# Patient Record
Sex: FEMALE | Race: WHITE | Employment: FULL TIME | URBAN - METROPOLITAN AREA
[De-identification: names, ages, dates, MRNs, and addresses within clinical notes are randomized per-mention and may not be internally consistent; named-entity substitution may affect disease eponyms.]

---

## 2017-02-09 ENCOUNTER — CLINICAL SUPPORT (OUTPATIENT)
Dept: CARDIOLOGY CLINIC | Age: 57
End: 2017-02-09

## 2017-02-09 ENCOUNTER — OFFICE VISIT (OUTPATIENT)
Dept: CARDIOLOGY CLINIC | Age: 57
End: 2017-02-09

## 2017-02-09 VITALS
WEIGHT: 200 LBS | HEIGHT: 65 IN | DIASTOLIC BLOOD PRESSURE: 90 MMHG | OXYGEN SATURATION: 96 % | SYSTOLIC BLOOD PRESSURE: 146 MMHG | HEART RATE: 106 BPM | BODY MASS INDEX: 33.32 KG/M2

## 2017-02-09 DIAGNOSIS — R55 SYNCOPE, UNSPECIFIED SYNCOPE TYPE: Primary | ICD-10-CM

## 2017-02-09 DIAGNOSIS — Z95.9 CARDIAC DEVICE IN SITU: ICD-10-CM

## 2017-02-09 DIAGNOSIS — I49.3 PVC'S (PREMATURE VENTRICULAR CONTRACTIONS): Primary | ICD-10-CM

## 2017-02-09 DIAGNOSIS — E78.5 HYPERLIPIDEMIA, UNSPECIFIED HYPERLIPIDEMIA TYPE: ICD-10-CM

## 2017-02-09 DIAGNOSIS — I49.3 PVC'S (PREMATURE VENTRICULAR CONTRACTIONS): ICD-10-CM

## 2017-02-09 DIAGNOSIS — R60.0 BILATERAL LEG EDEMA: ICD-10-CM

## 2017-02-09 NOTE — MR AVS SNAPSHOT
Visit Information Date & Time Provider Department Dept. Phone Encounter #  
 2/9/2017  1:20 PM Roslyn Diop MD [facility]  Your Appointments 2/20/2017  9:00 AM  
PRE PROCEDURE with Bp Hv C[facility] ([address] [place] [address]) Appt Note: AICD 2/28 [place] [address] [address] [address] [phone] 2300 [facility] [address] [address]  
  
    
 3/9/2017  8:20 AM  
Follow Up with Roslyn Diop MD  
[facility] ([address] [place] [address]) Appt Note: 6 month follow up with EKG  
 [address] [place] [address] [address] [address] [address] [phone] [address] [phone]  
  
    
 3/16/2017  9:45 AM  
LAB with [facility] NURSE VISIT Internist of [facility] ([address] [place] [address]) Appt Note: labs for pe rd  
 [address] [facility], [address] [facility] [address] [place]  
  
   
 [address], [address]  
  
    
 3/23/2017 10:30 AM  
PHYSICAL with Ton Hdez MD  
Internist of [address]) Appt Note: pe w/pap, rd  
 [address] [facility], [address] [address] [address] [place]  
  
   
 [address], [address] Upcoming Health Maintenance Date Due DTaP/Tdap/Td series (1 - Tdap) 10/6/2010 FOBT Q 1 YEAR AGE 50-75 12/11/2010 PAP AKA CERVICAL CYTOLOGY 11/21/2015 INFLUENZA AGE 9 TO ADULT 8/1/2016 BREAST CANCER SCRN MAMMOGRAM 4/9/2017 Allergies as of 2/9/2017  Review Complete On: 2/9/2017 By: Roslyn Diop MD  
  
 Severity Noted Reaction Type Reactions Adhesive Tape-silicones  78/13/7841    Contact Dermatitis Bee Sting [Sting, Bee]    Anaphylaxis Benzoyl Peroxide    Rash Betadine [Povidone-iodine]  02/08/2016    Rash Chlorhexidine Towelette  11/11/2016    Rash Codeine    Nausea and Vomiting Contrave [Naltrexone-bupropion]  06/19/2015    Other (comments) Vision changes Current Immunizations  Reviewed on 8/13/2012 Name Date  
 TD Vaccine 10/5/2010 Zoster Vaccine, Live 11/27/2013 Not reviewed this visit You Were Diagnosed With   
  
 Codes Comments Syncope, unspecified syncope type    -  Primary ICD-10-CM: R55 
ICD-9-CM: 780.2 PVC's (premature ventricular contractions)     ICD-10-CM: I49.3 ICD-9-CM: 427.69 Bilateral leg edema     ICD-10-CM: R60.0 ICD-9-CM: 008. 3 Hyperlipidemia, unspecified hyperlipidemia type     ICD-10-CM: E78.5 ICD-9-CM: 272.4 Cardiac device in situ     ICD-10-CM: Z95.9 ICD-9-CM: V45.00 Vitals BP Pulse Height(growth percentile) Weight(growth percentile) SpO2 BMI  
 146/90 (!) 106 5' 5\" (1.651 m) 200 lb (90.7 kg) 96% 33.28 kg/m2 OB Status Smoking Status Postmenopausal Former Smoker Vitals History BMI and BSA Data Body Mass Index Body Surface Area  
 33.28 kg/m 2 2.04 m 2 Preferred Pharmacy Pharmacy Name Phone [facility] [address] [place] [address] [phone] Your Updated Medication List  
  
   
This list is accurate as of: 2/9/17  1:55 PM.  Always use your most recent med list.  
  
  
  
  
 calcium-cholecalciferol (D3) tablet Commonly known as:  CALTRATE 600+D Take 1 Tab by mouth daily. CENTRUM SILVER WOMEN PO Take 1 Tab by mouth daily. diclofenac EC 75 mg EC tablet Commonly known as:  VOLTAREN  
TAKE 1 TAB BY MOUTH TWO (2) TIMES A DAY. EPINEPHrine 0.3 mg/0.3 mL injection Commonly known as:  EPIPEN  
0.3 mL by IntraMUSCular route once as needed for up to 1 dose. fish oil-dha-epa 1,200-144-216 mg Cap Take 1 Cap by mouth daily. pramipexole 1 mg tablet Commonly known as:  MIRAPEX TAKE 1 TABLET BY MOUTH THREE TIMES DAILY  
  
 traMADol 50 mg tablet Commonly known as:  ULTRAM  
Take 1 Tab by mouth every six (6) hours as needed for Pain. We Performed the Following AMB POC EKG ROUTINE W/ 12 LEADS, INTER & REP [85793 CPT(R)] Introducing [facility] SERVICES! Dear Sapna Mckeon: Thank you for requesting a ULURU account. Our records indicate that you already have an active ULURU account. You can access your account anytime at [email or web address] Did you know that you can access your hospital and ER discharge instructions at any time in ULURU? You can also review all of your test results from your hospital stay or ER visit. Additional Information If you have questions, please visit the Frequently Asked Questions section of the ULURU website at [email or web address] Remember, ULURU is NOT to be used for urgent needs. For medical emergencies, dial 911. Now available from your iPhone and Android! Please provide this summary of care documentation to your next provider. Your primary care clinician is listed as Benny Joiner. If you have any questions after today's visit, please call [phone].

## 2017-02-09 NOTE — PROGRESS NOTES
History of Present Illness:  A 64 y.o. female here for follow up. She had an episode of syncope last year. She had been taking Toprol 25 mg twice a day and it occurred while swallowing. I was concerned about possible vasovagal.  Her monitor did show a 3.2 second pause. Thinking back, she may have had some dizziness even prior to this. I placed a loop recorder in November to monitor especially given her history of PVCs. She has had at least three episodes of pause of up to 3.5 seconds. She has a baseline incomplete right bundle branch block with left anterior fascicular block. Given the fact that she has had syncope, she is here to discuss options.      Impression:   1. History of syncope likely related to bradycardia although vasovagal cannot be completely excluded. 2. Recurrent pauses despite stopping beta-blocker. 3. Baseline incomplete right bundle branch block with left anterior fascicular block. 4. Chronic pain and tension headaches. 5. History of PVCs with attempted ablation February 2016. 6. History of exercise induced asthma.      Given her history of syncope with findings by her loop recorder showing monthly pauses consistent with sick sinus syndrome of up to 3.5 seconds as well as abnormal EKG with incomplete right bundle branch block and left anterior fascicular block, I recommended implantation of a dual chamber MRI compatible pacemaker given her young age. She would like to proceed. Risks, benefits, alternatives discussed. All questions answered. I will make arrangements. Past Medical History   Diagnosis Date    Arrhythmia      PVC 10% burden; s/p partial ablation Dr Maribel Smith 2/16    Asthma      h/o exercise induced    Cardiac Agatston CAC score, <100 02/29/2016     Coronary calcium score 0.    Cardiac echocardiogram 01/19/2016     EF 55-60%. No RWMA. Indeterminate diastolic fx. RVSP 26 mmHg. Mild MR.       Cardiac Holter monitor study 12/21/2015     Sinus rhythm, avg HR 82 bpm (range  bpm). Numerous PVCs. A few short runs of VT.  Cardiovascular LE venous duplex 07/08/2016     No DVT bilaterally.  Cholelithiasis 2008     on US    Chronic back pain      Dr. Jorden Ruiz tenosynovitis      Dr Nolan Amos Dupuytren's contracture of both hands      Dr Nolan Amos FHx: heart disease     GERD (gastroesophageal reflux disease) 2/99     on UGI    History of palpitations 2005    Hyperlipidemia      calculated 10 year risk score was 1.8% (12/15); 2.2% (10/16)    Hypovitaminosis D     Myofacial pain      neck and thoracic    OA (osteoarthritis)      lumbar DDD    Obesity      peak weight 200 lbs, bmi 33.3 from 5/13; qsymia ineffective, intol contrave, delcined med supervised wt loss; 24h U petar nl    Osteopenia 2007     t score -1 spine, -0.2 hip Dr. Maggy Holman Pericardial cyst 12/13     calcified on CT    Prediabetes 11/12    Tachycardia      neg eval Dr. Kelton Garvin 2004    Tension headache     Thyroid nodule 8/12     3mm; 5/13, 12/13, 11/14, 1/16 no change    Trigger finger of both hands      Dr Luis De La Torre       Current Outpatient Prescriptions   Medication Sig Dispense Refill    pramipexole (MIRAPEX) 1 mg tablet TAKE 1 TABLET BY MOUTH THREE TIMES DAILY 180 Tab 2    diclofenac EC (VOLTAREN) 75 mg EC tablet TAKE 1 TAB BY MOUTH TWO (2) TIMES A DAY. 180 Tab 2    calcium-cholecalciferol, D3, (CALTRATE 600+D) tablet Take 1 Tab by mouth daily.  fish oil-dha-epa 1,200-144-216 mg cap Take 1 Cap by mouth daily.  MULTIVITS-MIN/IRON/FA/LUTEIN (CENTRUM SILVER WOMEN PO) Take 1 Tab by mouth daily.  EPINEPHrine (EPIPEN) 0.3 mg/0.3 mL (1:1,000) injection 0.3 mL by IntraMUSCular route once as needed for up to 1 dose. 0.3 mL 1    traMADol (ULTRAM) 50 mg tablet Take 1 Tab by mouth every six (6) hours as needed for Pain. 60 Tab 0       Social History   reports that she quit smoking about 24 years ago. She has a 20.00 pack-year smoking history.  She has never used smokeless tobacco.   reports that she drinks alcohol. Family History  family history includes Alcohol abuse in her brother; Aldon Frisk in her brother, father, mother, and sister; Cancer in her mother; Diabetes in her father and mother; Elevated Lipids in her father and mother; Heart Disease in her father; Hypertension in her mother; Migraines in her sister; Stroke in her mother. Review of Systems  Except as stated above include:  Constitutional: Negative for fever, chills and malaise/fatigue. HEENT: No congestion or recent URI. Gastrointestinal: No nausea, vomiting, abdominal pain, bloody stools. Pulmonary:  Negative except as stated above. Cardiac:  Negative except as stated above. Musculoskeletal: Negative except as stated above. Neurological:  No localized symptoms. Skin:  Negative except as stated above. Psych:  No mood swings. Endocrine:  No heat/cold intolerance. PHYSICAL EXAM  BP Readings from Last 3 Encounters:   02/09/17 146/90   11/11/16 128/59   10/27/16 140/70     Pulse Readings from Last 3 Encounters:   02/09/17 (!) 106   11/11/16 96   10/27/16 (!) 103     Wt Readings from Last 3 Encounters:   02/09/17 90.7 kg (200 lb)   11/11/16 87.5 kg (193 lb)   10/27/16 90.3 kg (199 lb)     General:   Well developed, well groomed. Head/Neck:   No jugular venous distention     No carotid bruits. No evidence of xanthelasma. Lungs:   No respiratory distress. Clear bilaterally. Heart:    Regular rate and rhythm. Normal S1/S2. Palpation of heart with normal point of maximum impulse. No significant murmurs, rubs or gallops. Abdomen:   Soft and nontender. No palpable abdominal mass or bruits. Extremities:   Intact peripheral pulses. No significant edema. Neurological:   Alert and oriented to person, place, time. No focal neurological deficit visually.

## 2017-02-09 NOTE — LETTER
2017 2:03 PM 
 
 
Halima Claydary 
[id number] 
1960 Insurance:  [employer] FEDERAL                Auth # No auth needed Proc Date:                 Proc Time:  9:15am 
 
Performing MD : Dr. [facility]:  Derik Noss / email                                             PCP Theodore Mcdonald Scheduled with:  Derik Helm / email                                                         Date:2017 HP:      EK/9  Labs:______  CXR: _______  Orders:   Special Instructions:  _____________________________________________________ 
______________________________________________________________________ 
______________________________________________________________________ Date Faxed:   ______________   Pages Faxed: ___________________ The materials enclosed with this facsimile transmission are private and confidential and are the property of the sender. If you are not the intended recipient, be advised that any unauthorized use, disclosure, copying, distribution, or the taking of any action in reliance on the contents of this telecopied information is strictly prohibited. If you have received this in error, please immediately notify the sender via telephone to arrange for return of the forwarded documentation.  
 
 
 
;hilary

## 2017-02-09 NOTE — PROGRESS NOTES
1. Have you been to the ER, urgent care clinic since your last visit? Hospitalized since your last visit? No     2. Have you seen or consulted any other health care providers outside of the [address] [place] [address] since your last visit? Include any pap smears or colon screening.  No

## 2017-02-20 ENCOUNTER — OFFICE VISIT (OUTPATIENT)
Dept: CARDIOLOGY CLINIC | Age: 57
End: 2017-02-20

## 2017-02-20 ENCOUNTER — HOSPITAL ENCOUNTER (OUTPATIENT)
Dept: LAB | Age: 57
Discharge: HOME | End: 2017-02-20
Payer: COMMERCIAL

## 2017-02-20 ENCOUNTER — HOSPITAL ENCOUNTER (OUTPATIENT)
Dept: GENERAL RADIOLOGY | Age: 57
Discharge: HOME | End: 2017-02-20
Payer: COMMERCIAL

## 2017-02-20 DIAGNOSIS — I49.3 PVC'S (PREMATURE VENTRICULAR CONTRACTIONS): ICD-10-CM

## 2017-02-20 DIAGNOSIS — R55 SYNCOPE, UNSPECIFIED SYNCOPE TYPE: ICD-10-CM

## 2017-02-20 DIAGNOSIS — R60.0 BILATERAL LEG EDEMA: ICD-10-CM

## 2017-02-20 DIAGNOSIS — Z95.9 CARDIAC DEVICE IN SITU: ICD-10-CM

## 2017-02-20 DIAGNOSIS — I49.3 PVC'S (PREMATURE VENTRICULAR CONTRACTIONS): Primary | ICD-10-CM

## 2017-02-20 DIAGNOSIS — E78.5 HYPERLIPIDEMIA, UNSPECIFIED HYPERLIPIDEMIA TYPE: ICD-10-CM

## 2017-02-20 LAB
ALBUMIN SERPL BCP-MCNC: 3.9 G/DL (ref 3.4–5)
ALBUMIN/GLOB SERPL: 1.3 {RATIO} (ref 0.8–1.7)
ALP SERPL-CCNC: 69 U/L (ref 45–117)
ALT SERPL-CCNC: 20 U/L (ref 13–56)
ANION GAP BLD CALC-SCNC: 4 MMOL/L (ref 3–18)
AST SERPL W P-5'-P-CCNC: 15 U/L (ref 15–37)
BASOPHILS # BLD AUTO: 0 K/UL (ref 0–0.06)
BASOPHILS # BLD: 0 % (ref 0–2)
BILIRUB SERPL-MCNC: 0.5 MG/DL (ref 0.2–1)
BUN SERPL-MCNC: 19 MG/DL (ref 7–18)
BUN/CREAT SERPL: 22 (ref 12–20)
CALCIUM SERPL-MCNC: 9.2 MG/DL (ref 8.5–10.1)
CHLORIDE SERPL-SCNC: 103 MMOL/L (ref 100–108)
CO2 SERPL-SCNC: 29 MMOL/L (ref 21–32)
CREAT SERPL-MCNC: 0.88 MG/DL (ref 0.6–1.3)
DIFFERENTIAL METHOD BLD: NORMAL
EOSINOPHIL # BLD: 0 K/UL (ref 0–0.4)
EOSINOPHIL NFR BLD: 1 % (ref 0–5)
ERYTHROCYTE [DISTWIDTH] IN BLOOD BY AUTOMATED COUNT: 13.7 % (ref 11.6–14.5)
GLOBULIN SER CALC-MCNC: 3 G/DL (ref 2–4)
GLUCOSE SERPL-MCNC: 109 MG/DL (ref 74–99)
HCT VFR BLD AUTO: 39 % (ref 35–45)
HGB BLD-MCNC: 13.1 G/DL (ref 12–16)
INR PPP: 0.9 (ref 0.8–1.2)
LYMPHOCYTES # BLD AUTO: 33 % (ref 21–52)
LYMPHOCYTES # BLD: 2.2 K/UL (ref 0.9–3.6)
MCH RBC QN AUTO: 30.5 PG (ref 24–34)
MCHC RBC AUTO-ENTMCNC: 33.6 G/DL (ref 31–37)
MCV RBC AUTO: 90.9 FL (ref 74–97)
MONOCYTES # BLD: 0.4 K/UL (ref 0.05–1.2)
MONOCYTES NFR BLD AUTO: 6 % (ref 3–10)
NEUTS SEG # BLD: 4.2 K/UL (ref 1.8–8)
NEUTS SEG NFR BLD AUTO: 60 % (ref 40–73)
PLATELET # BLD AUTO: 188 K/UL (ref 135–420)
PMV BLD AUTO: 11.5 FL (ref 9.2–11.8)
POTASSIUM SERPL-SCNC: 3.7 MMOL/L (ref 3.5–5.5)
PROT SERPL-MCNC: 6.9 G/DL (ref 6.4–8.2)
PROTHROMBIN TIME: 12.2 SEC (ref 11.5–15.2)
RBC # BLD AUTO: 4.29 M/UL (ref 4.2–5.3)
SODIUM SERPL-SCNC: 136 MMOL/L (ref 136–145)
WBC # BLD AUTO: 6.9 K/UL (ref 4.6–13.2)

## 2017-02-20 PROCEDURE — 85610 PROTHROMBIN TIME: CPT | Performed by: INTERNAL MEDICINE

## 2017-02-20 PROCEDURE — 36415 COLL VENOUS BLD VENIPUNCTURE: CPT | Performed by: INTERNAL MEDICINE

## 2017-02-20 PROCEDURE — 71020 XR CHEST PA LAT: CPT

## 2017-02-20 PROCEDURE — 85025 COMPLETE CBC W/AUTO DIFF WBC: CPT | Performed by: INTERNAL MEDICINE

## 2017-02-20 PROCEDURE — 80053 COMPREHEN METABOLIC PANEL: CPT | Performed by: INTERNAL MEDICINE

## 2017-02-20 NOTE — PATIENT INSTRUCTIONS
DR. [facility]          Patient  EP Instructions                  1. You are scheduled to have a  Dual chamber MRI safe pacemaker device implant on  2/28/17 , at 915 am.    Please check in at 8am.    2. Please go to DR. DUFFY'TANMAY AN and trista in the outpatient parking lot that is located around to the back of the hospital and enter through the [facility] building. Once you enter through the [facility] check in with the  there. The  will either give you directions or assist you in getting to the cath holding area. 3.  [x]       You are not to eat or drink anything after midnight the morning of the procedure. 4. Please continue to take your medications with a small sip of water on the morning of the procedure with the following exceptions:  N/A     5. If you are diabetic, do not take your insulin/sugar pill the morning of the procedure. 6. We encourage families to wait in the waiting room on the first floor while the procedure is being done. The Doctor will come out and talk with you as soon as the procedure is over. 7. There is the possibility that you may spend the night in the hospital, depending on the results of the procedure. This will be determined after the procedure is done. 8.   If you or your family have any questions, please call our office Monday-Friday 9:00am         -4:30 pm , at [phone], and ask to speak to one of the nurses.

## 2017-02-20 NOTE — MR AVS SNAPSHOT
Visit Information Date & Time Provider Department Dept. Phone Encounter #  
 2/20/2017  9:00 AM [facility] [address] [id number] Your Appointments 3/9/2017  8:20 AM  
Follow Up with William Albert MD  
[facility] ([address]) Appt Note: 6 month follow up with EKG  
 [address] [place] [address] [address] [phone] [address] [phone]  
  
    
 3/16/2017  9:45 AM  
LAB with [facility] NURSE VISIT Internist of [facility] ([facility]) Appt Note: labs for pe rd  
 [address] [facility], [address] [facility] [address] [place]  
  
   
 [address], [address]  
  
    
 3/23/2017 10:30 AM  
PHYSICAL with Nate Maldonado MD  
Internist of [address]) Appt Note: pe w/pap, rd  
 [address] [facility], [address] [place] [address] [place]  
  
   
 [address], [address] Upcoming Health Maintenance Date Due DTaP/Tdap/Td series (1 - Tdap) 10/6/2010 FOBT Q 1 YEAR AGE 50-75 12/11/2010 PAP AKA CERVICAL CYTOLOGY 11/21/2015 INFLUENZA AGE 9 TO ADULT 8/1/2016 BREAST CANCER SCRN MAMMOGRAM 4/9/2017 Allergies as of 2/20/2017  Review Complete On: 2/9/2017 By: William Albert MD  
  
 Severity Noted Reaction Type Reactions Adhesive Tape-silicones  14/06/7948    Contact Dermatitis Bee Sting [Sting, Bee]    Anaphylaxis Benzoyl Peroxide    Rash Betadine [Povidone-iodine]  02/08/2016    Rash Chlorhexidine Towelette  11/11/2016    Rash Codeine    Nausea and Vomiting Contrave [Naltrexone-bupropion]  06/19/2015    Other (comments) Vision changes Current Immunizations  Reviewed on 8/13/2012 Name Date  
 TD Vaccine 10/5/2010 Zoster Vaccine, Live 11/27/2013 Not reviewed this visit Vitals OB Status Smoking Status Postmenopausal Former Smoker Preferred Pharmacy Pharmacy Name Phone [address] [facility] [phone] Your Updated Medication List  
  
   
This list is accurate as of: 2/20/17  9:19 AM.  Always use your most recent med list.  
  
  
  
  
 calcium-cholecalciferol (D3) tablet Commonly known as:  CALTRATE 600+D Take 1 Tab by mouth daily. CENTRUM SILVER WOMEN PO Take 1 Tab by mouth daily. diclofenac EC 75 mg EC tablet Commonly known as:  VOLTAREN  
TAKE 1 TAB BY MOUTH TWO (2) TIMES A DAY. EPINEPHrine 0.3 mg/0.3 mL injection Commonly known as:  EPIPEN  
0.3 mL by IntraMUSCular route once as needed for up to 1 dose. fish oil-dha-epa 1,200-144-216 mg Cap Take 1 Cap by mouth daily. pramipexole 1 mg tablet Commonly known as:  MIRAPEX TAKE 1 TABLET BY MOUTH THREE TIMES DAILY  
  
 traMADol 50 mg tablet Commonly known as:  ULTRAM  
Take 1 Tab by mouth every six (6) hours as needed for Pain. To-Do List   
 02/28/2017 9:15 AM  
  Appointment with [facility]; [facility] LAB; [facility] CATH HOLDING at [address] [place] [address] ([phone]) Patient Instructions DR. IRVIN [facility] Patient  EP Instructions 1. You are scheduled to have a  Dual chamber MRI pacemaker device implant on  2/28/17 , at 915 am.    Please check in at 8am. 
 
2. Please go to DR. BRANDEE AN and trista in the outpatient parking lot that is located around to the back of the hospital and enter through the [facility] building. Once you enter through the [facility] check in with the  there. The  will either give you directions or assist you in getting to the cath holding area. 3.         You are not to eat or drink anything after midnight the morning of the procedure. 4. Please continue to take your medications with a small sip of water on the morning of the procedure with the following exceptions:  N/A  
 
5. If you are diabetic, do not take your insulin/sugar pill the morning of the procedure. 6. We encourage families to wait in the waiting room on the first floor while the procedure is being done. The Doctor will come out and talk with you as soon as the procedure is over. 7. There is the possibility that you may spend the night in the hospital, depending on the results of the procedure. This will be determined after the procedure is done. 8.   If you or your family have any questions, please call our office Monday-Friday 9:00am  
      -4:30 pm , at [phone], and ask to speak to one of the nurses. Introducing [facility] & HEALTH SERVICES! Dear Cheryl Ibanez: Thank you for requesting a [employer]ambol account. Our records indicate that you already have an active Funambol account. You can access your account anytime at [email or web address] Did you know that you can access your hospital and ER discharge instructions at any time in Funambol? You can also review all of your test results from your hospital stay or ER visit. Additional Information If you have questions, please visit the Frequently Asked Questions section of the Funambol website at [email or web address] Remember, Funambol is NOT to be used for urgent needs. For medical emergencies, dial 911. Now available from your iPhone and Android! Please provide this summary of care documentation to your next provider. Your primary care clinician is listed as Margi Victoria. If you have any questions after today's visit, please call [phone].

## 2017-02-20 NOTE — PROGRESS NOTES
Patient given written instructions at time of visit and allowed time for questions to be answered Rui Kovacs LPN

## 2017-02-24 NOTE — H&P
Please see full H&P. Plan dual chamber MRI compatible pacemaker implantation and removal of loop recorder. All risks, benefits, alternatives discussed. Plan moderate sedation if anesthesia not available. Risks included but not limited to pain, infection, bleeding, deep venous thrombosis with chronic swelling of arm, anesthesia reactions, pneumothorax, hemothorax, emergent open heart surgery, and death. All questions answered. 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 History of Present Illness: A 64 y.o. female here for follow up. She had an episode of syncope last year. She had been taking Toprol 25 mg twice a day and it occurred while swallowing. I was concerned about possible vasovagal. Her monitor did show a 3.2 second pause. Thinking back, she may have had some dizziness even prior to this. I placed a loop recorder in November to monitor especially given her history of PVCs. She has had at least three episodes of pause of up to 3.5 seconds. She has a baseline incomplete right bundle branch block with left anterior fascicular block. Given the fact that she has had syncope, she is here to discuss options.       Impression:   Primary cardiologist Dr. Robby Abdul  1. History of syncope likely related to bradycardia although vasovagal cannot be completely excluded. 2. Recurrent pauses despite stopping beta-blocker. 3. Baseline incomplete right bundle branch block with left anterior fascicular block. 4. Chronic pain and tension headaches. 5. History of PVCs with attempted ablation February 2016.    6. History of exercise induced asthma.       Given her history of syncope with findings by her loop recorder showing monthly pauses consistent with sick sinus syndrome of up to 3.5 seconds as well as abnormal EKG with incomplete right bundle branch block and left anterior fascicular block, I recommended implantation of a dual chamber MRI compatible pacemaker given her young age. She would like to proceed. Risks, benefits, alternatives discussed. All questions answered. I will make arrangements.           Past Medical History   Diagnosis Date    Arrhythmia         PVC 10% burden; s/p partial ablation Dr Bart Bertrand 2/16    Asthma         h/o exercise induced    Cardiac Agatston CAC score, <100 02/29/2016       Coronary calcium score 0.    Cardiac echocardiogram 01/19/2016       EF 55-60%. No RWMA. Indeterminate diastolic fx. RVSP 26 mmHg. Mild MR.  Cardiac Holter monitor study 12/21/2015       Sinus rhythm, avg HR 82 bpm (range  bpm). Numerous PVCs. A few short runs of VT.  Cardiovascular LE venous duplex 07/08/2016       No DVT bilaterally.     Cholelithiasis 2008       on US    Chronic back pain         Dr. Wiley Holbrook tenosynovitis         Dr Deborah Paris Dupuytren's contracture of both hands         Dr Deborah Paris FHx: heart disease      GERD (gastroesophageal reflux disease) 2/99       on UGI    History of palpitations 2005    Hyperlipidemia         calculated 10 year risk score was 1.8% (12/15); 2.2% (10/16)    Hypovitaminosis D      Myofacial pain         neck and thoracic    OA (osteoarthritis)         lumbar DDD    Obesity         peak weight 200 lbs, bmi 33.3 from 5/13; qsymia ineffective, intol contrave, delcined med supervised wt loss; 24h U petar nl    Osteopenia 2007       t score -1 spine, -0.2 hip Dr. Lucinda Bernheim Pericardial cyst 12/13       calcified on CT    Prediabetes 11/12    Tachycardia         neg eval Dr. Marta Brooks 2004    Tension headache      Thyroid nodule 8/12       3mm; 5/13, 12/13, 11/14, 1/16 no change    Trigger finger of both hands         Dr Nnamdi Ram                Current Outpatient Prescriptions   Medication Sig Dispense Refill    pramipexole (MIRAPEX) 1 mg tablet TAKE 1 TABLET BY MOUTH THREE TIMES DAILY 180 Tab 2    diclofenac EC (VOLTAREN) 75 mg EC tablet TAKE 1 TAB BY MOUTH TWO (2) TIMES A DAY. 180 Tab 2    calcium-cholecalciferol, D3, (CALTRATE 600+D) tablet Take 1 Tab by mouth daily.        fish oil-dha-epa 1,200-144-216 mg cap Take 1 Cap by mouth daily.        MULTIVITS-MIN/IRON/FA/LUTEIN (CENTRUM SILVER WOMEN PO) Take 1 Tab by mouth daily.        EPINEPHrine (EPIPEN) 0.3 mg/0.3 mL (1:1,000) injection 0.3 mL by IntraMUSCular route once as needed for up to 1 dose. 0.3 mL 1    traMADol (ULTRAM) 50 mg tablet Take 1 Tab by mouth every six (6) hours as needed for Pain. 60 Tab 0         Social History   reports that she quit smoking about 24 years ago. She has a 20.00 pack-year smoking history. She has never used smokeless tobacco.   reports that she drinks alcohol.     Family History  family history includes Alcohol abuse in her brother; Claudy Shutter in her brother, father, mother, and sister; Cancer in her mother; Diabetes in her father and mother; Elevated Lipids in her father and mother; Heart Disease in her father; Hypertension in her mother; Migraines in her sister; Stroke in her mother.     Review of Systems  Except as stated above include:  Constitutional: Negative for fever, chills and malaise/fatigue. HEENT: No congestion or recent URI. Gastrointestinal: No nausea, vomiting, abdominal pain, bloody stools. Pulmonary: Negative except as stated above. Cardiac: Negative except as stated above. Musculoskeletal: Negative except as stated above. Neurological: No localized symptoms. Skin: Negative except as stated above. Psych: No mood swings.   Endocrine: No heat/cold intolerance.     PHYSICAL EXAM      BP Readings from Last 3 Encounters:   02/09/17 146/90   11/11/16 128/59   10/27/16 140/70          Pulse Readings from Last 3 Encounters:   02/09/17 (!) 106   11/11/16 96   10/27/16 (!) 103          Wt Readings from Last 3 Encounters:   02/09/17 90.7 kg (200 lb)   11/11/16 87.5 kg (193 lb)   10/27/16 90.3 kg (199 lb)      General:  Well developed, well groomed. Head/Neck:  No jugular venous distention   No carotid bruits. No evidence of xanthelasma. Lungs:  No respiratory distress. Clear bilaterally. Heart:   Regular rate and rhythm. Normal S1/S2. Palpation of heart with normal point of maximum impulse. No significant murmurs, rubs or gallops. Abdomen:  Soft and nontender. No palpable abdominal mass or bruits. Extremities:  Intact peripheral pulses. No significant edema. Neurological:  Alert and oriented to person, place, time.    No focal neurological deficit visually.         Electronically signed by Yanet Tapia MD at 02/13/17 0958

## 2017-02-27 NOTE — PROGRESS NOTES
I have personally seen and evaluated the device findings. Interrogation reviewed and I agree with assessment.     Alessandra Murillo

## 2017-02-28 ENCOUNTER — ANESTHESIA EVENT (OUTPATIENT)
Dept: CARDIAC CATH/INVASIVE PROCEDURES | Age: 57
End: 2017-02-28
Payer: COMMERCIAL

## 2017-02-28 ENCOUNTER — ANESTHESIA (OUTPATIENT)
Dept: CARDIAC CATH/INVASIVE PROCEDURES | Age: 57
End: 2017-02-28
Payer: COMMERCIAL

## 2017-02-28 ENCOUNTER — HOSPITAL ENCOUNTER (OUTPATIENT)
Dept: CARDIAC CATH/INVASIVE PROCEDURES | Age: 57
Setting detail: OBSERVATION
Discharge: HOME | End: 2017-03-01
Attending: INTERNAL MEDICINE | Admitting: INTERNAL MEDICINE
Payer: COMMERCIAL

## 2017-02-28 ENCOUNTER — APPOINTMENT (OUTPATIENT)
Dept: GENERAL RADIOLOGY | Age: 57
End: 2017-02-28
Attending: INTERNAL MEDICINE
Payer: COMMERCIAL

## 2017-02-28 PROBLEM — Z95.0 PACEMAKER: Status: ACTIVE | Noted: 2017-02-28

## 2017-02-28 PROCEDURE — 77030011640 HC PAD GRND REM COVD -A

## 2017-02-28 PROCEDURE — 74011250636 HC RX REV CODE- 250/636: Performed by: NURSE ANESTHETIST, CERTIFIED REGISTERED

## 2017-02-28 PROCEDURE — 76060000034 HC ANESTHESIA 1.5 TO 2 HR

## 2017-02-28 PROCEDURE — 74011250637 HC RX REV CODE- 250/637: Performed by: INTERNAL MEDICINE

## 2017-02-28 PROCEDURE — 77030018729 HC ELECTRD DEFIB PAD CARD -B

## 2017-02-28 PROCEDURE — 77030022017 HC DRSG HEMO QCLOT ZMED -A

## 2017-02-28 PROCEDURE — 33208 INSRT HEART PM ATRIAL & VENT: CPT

## 2017-02-28 PROCEDURE — 71010 XR CHEST PORT: CPT

## 2017-02-28 PROCEDURE — 77030002996 HC SUT SLK J&J -A

## 2017-02-28 PROCEDURE — 74011250636 HC RX REV CODE- 250/636: Performed by: INTERNAL MEDICINE

## 2017-02-28 PROCEDURE — C1892 INTRO/SHEATH,FIXED,PEEL-AWAY: HCPCS

## 2017-02-28 PROCEDURE — C1785 PMKR, DUAL, RATE-RESP: HCPCS

## 2017-02-28 PROCEDURE — 77030031139 HC SUT VCRL2 J&J -A

## 2017-02-28 PROCEDURE — 74011250636 HC RX REV CODE- 250/636

## 2017-02-28 PROCEDURE — 74011636320 HC RX REV CODE- 636/320: Performed by: INTERNAL MEDICINE

## 2017-02-28 PROCEDURE — 77030002933 HC SUT MCRYL J&J -A

## 2017-02-28 PROCEDURE — 77030019580 HC CBL PACE MEDT -B

## 2017-02-28 PROCEDURE — C1898 LEAD, PMKR, OTHER THAN TRANS: HCPCS

## 2017-02-28 PROCEDURE — 99218 HC RM OBSERVATION: CPT

## 2017-02-28 PROCEDURE — 74011000250 HC RX REV CODE- 250: Performed by: INTERNAL MEDICINE

## 2017-02-28 RX ORDER — SODIUM CHLORIDE 9 MG/ML
INJECTION, SOLUTION INTRAVENOUS
Status: DISCONTINUED | OUTPATIENT
Start: 2017-02-28 | End: 2017-02-28 | Stop reason: HOSPADM

## 2017-02-28 RX ORDER — SODIUM CHLORIDE 0.9 % (FLUSH) 0.9 %
5-10 SYRINGE (ML) INJECTION AS NEEDED
Status: DISCONTINUED | OUTPATIENT
Start: 2017-02-28 | End: 2017-03-01 | Stop reason: HOSPADM

## 2017-02-28 RX ORDER — CALCIUM CARBONATE/VITAMIN D3 600MG-5MCG
1 TABLET ORAL DAILY
Status: DISCONTINUED | OUTPATIENT
Start: 2017-03-01 | End: 2017-03-01 | Stop reason: HOSPADM

## 2017-02-28 RX ORDER — MAGNESIUM SULFATE 100 %
4 CRYSTALS MISCELLANEOUS AS NEEDED
Status: DISCONTINUED | OUTPATIENT
Start: 2017-02-28 | End: 2017-03-01 | Stop reason: HOSPADM

## 2017-02-28 RX ORDER — FENTANYL CITRATE 50 UG/ML
INJECTION, SOLUTION INTRAMUSCULAR; INTRAVENOUS AS NEEDED
Status: DISCONTINUED | OUTPATIENT
Start: 2017-02-28 | End: 2017-02-28 | Stop reason: HOSPADM

## 2017-02-28 RX ORDER — ZOLPIDEM TARTRATE 5 MG/1
5 TABLET ORAL
Status: DISCONTINUED | OUTPATIENT
Start: 2017-02-28 | End: 2017-03-01 | Stop reason: HOSPADM

## 2017-02-28 RX ORDER — ACETAMINOPHEN 500 MG
500 TABLET ORAL
Status: DISCONTINUED | OUTPATIENT
Start: 2017-02-28 | End: 2017-03-01 | Stop reason: HOSPADM

## 2017-02-28 RX ORDER — CEFAZOLIN SODIUM 2 G/50ML
2 SOLUTION INTRAVENOUS ONCE
Status: COMPLETED | OUTPATIENT
Start: 2017-02-28 | End: 2017-02-28

## 2017-02-28 RX ORDER — TRAMADOL HYDROCHLORIDE 50 MG/1
50 TABLET ORAL
Status: DISCONTINUED | OUTPATIENT
Start: 2017-02-28 | End: 2017-03-01 | Stop reason: HOSPADM

## 2017-02-28 RX ORDER — PRAMIPEXOLE DIHYDROCHLORIDE 1 MG/1
2 TABLET ORAL
Status: DISCONTINUED | OUTPATIENT
Start: 2017-02-28 | End: 2017-03-01 | Stop reason: HOSPADM

## 2017-02-28 RX ORDER — OXYCODONE AND ACETAMINOPHEN 5; 325 MG/1; MG/1
1 TABLET ORAL
Status: DISCONTINUED | OUTPATIENT
Start: 2017-02-28 | End: 2017-03-01 | Stop reason: HOSPADM

## 2017-02-28 RX ORDER — DEXTROSE 50 % IN WATER (D50W) INTRAVENOUS SYRINGE
25-50 AS NEEDED
Status: DISCONTINUED | OUTPATIENT
Start: 2017-02-28 | End: 2017-03-01 | Stop reason: HOSPADM

## 2017-02-28 RX ORDER — SODIUM CHLORIDE, SODIUM LACTATE, POTASSIUM CHLORIDE, CALCIUM CHLORIDE 600; 310; 30; 20 MG/100ML; MG/100ML; MG/100ML; MG/100ML
75 INJECTION, SOLUTION INTRAVENOUS CONTINUOUS
Status: DISPENSED | OUTPATIENT
Start: 2017-02-28 | End: 2017-02-28

## 2017-02-28 RX ORDER — CEFAZOLIN SODIUM 2 G/50ML
2 SOLUTION INTRAVENOUS EVERY 8 HOURS
Status: COMPLETED | OUTPATIENT
Start: 2017-02-28 | End: 2017-03-01

## 2017-02-28 RX ORDER — ONDANSETRON 2 MG/ML
4 INJECTION INTRAMUSCULAR; INTRAVENOUS ONCE
Status: ACTIVE | OUTPATIENT
Start: 2017-02-28 | End: 2017-02-28

## 2017-02-28 RX ORDER — LIDOCAINE HYDROCHLORIDE 10 MG/ML
1-60 INJECTION, SOLUTION EPIDURAL; INFILTRATION; INTRACAUDAL; PERINEURAL
Status: DISCONTINUED | OUTPATIENT
Start: 2017-02-28 | End: 2017-02-28

## 2017-02-28 RX ORDER — OXYCODONE AND ACETAMINOPHEN 5; 325 MG/1; MG/1
1 TABLET ORAL
Qty: 20 TAB | Refills: 0 | Status: SHIPPED | OUTPATIENT
Start: 2017-02-28 | End: 2017-03-23 | Stop reason: ALTCHOICE

## 2017-02-28 RX ORDER — DICLOFENAC SODIUM 75 MG/1
75 TABLET, DELAYED RELEASE ORAL 2 TIMES DAILY
Status: DISCONTINUED | OUTPATIENT
Start: 2017-02-28 | End: 2017-03-01 | Stop reason: HOSPADM

## 2017-02-28 RX ORDER — HEPARIN SODIUM 200 [USP'U]/100ML
500 INJECTION, SOLUTION INTRAVENOUS ONCE
Status: COMPLETED | OUTPATIENT
Start: 2017-02-28 | End: 2017-02-28

## 2017-02-28 RX ORDER — PROPOFOL 10 MG/ML
INJECTION, EMULSION INTRAVENOUS
Status: DISCONTINUED | OUTPATIENT
Start: 2017-02-28 | End: 2017-02-28 | Stop reason: HOSPADM

## 2017-02-28 RX ORDER — SODIUM CHLORIDE 0.9 % (FLUSH) 0.9 %
5-10 SYRINGE (ML) INJECTION EVERY 8 HOURS
Status: DISCONTINUED | OUTPATIENT
Start: 2017-02-28 | End: 2017-03-01 | Stop reason: HOSPADM

## 2017-02-28 RX ORDER — HYDROMORPHONE HYDROCHLORIDE 2 MG/ML
0.5 INJECTION, SOLUTION INTRAMUSCULAR; INTRAVENOUS; SUBCUTANEOUS AS NEEDED
Status: DISCONTINUED | OUTPATIENT
Start: 2017-02-28 | End: 2017-03-01 | Stop reason: HOSPADM

## 2017-02-28 RX ORDER — CEFAZOLIN SODIUM 1 G/3ML
1 INJECTION, POWDER, FOR SOLUTION INTRAMUSCULAR; INTRAVENOUS ONCE
Status: COMPLETED | OUTPATIENT
Start: 2017-02-28 | End: 2017-02-28

## 2017-02-28 RX ORDER — PRAMIPEXOLE DIHYDROCHLORIDE 1 MG/1
1 TABLET ORAL 3 TIMES DAILY
Status: DISCONTINUED | OUTPATIENT
Start: 2017-02-28 | End: 2017-02-28

## 2017-02-28 RX ADMIN — FENTANYL CITRATE 50 MCG: 50 INJECTION, SOLUTION INTRAMUSCULAR; INTRAVENOUS at 10:32

## 2017-02-28 RX ADMIN — LIDOCAINE HYDROCHLORIDE 60 ML: 10 INJECTION, SOLUTION EPIDURAL; INFILTRATION; INTRACAUDAL; PERINEURAL at 10:52

## 2017-02-28 RX ADMIN — FENTANYL CITRATE 50 MCG: 50 INJECTION, SOLUTION INTRAMUSCULAR; INTRAVENOUS at 10:05

## 2017-02-28 RX ADMIN — Medication 10 ML: at 14:00

## 2017-02-28 RX ADMIN — CEFAZOLIN SODIUM 2 G: 2 SOLUTION INTRAVENOUS at 18:19

## 2017-02-28 RX ADMIN — IOPAMIDOL 15 ML: 612 INJECTION, SOLUTION INTRAVENOUS at 10:53

## 2017-02-28 RX ADMIN — FENTANYL CITRATE 50 MCG: 50 INJECTION, SOLUTION INTRAMUSCULAR; INTRAVENOUS at 10:14

## 2017-02-28 RX ADMIN — SODIUM CHLORIDE, SODIUM LACTATE, POTASSIUM CHLORIDE, AND CALCIUM CHLORIDE 75 ML/HR: 600; 310; 30; 20 INJECTION, SOLUTION INTRAVENOUS at 11:30

## 2017-02-28 RX ADMIN — SODIUM CHLORIDE: 9 INJECTION, SOLUTION INTRAVENOUS at 10:02

## 2017-02-28 RX ADMIN — OXYCODONE HYDROCHLORIDE AND ACETAMINOPHEN 1 TABLET: 5; 325 TABLET ORAL at 18:19

## 2017-02-28 RX ADMIN — IOPAMIDOL 15 ML: 612 INJECTION, SOLUTION INTRAVENOUS at 10:49

## 2017-02-28 RX ADMIN — CEFAZOLIN 1 G: 330 INJECTION, POWDER, FOR SOLUTION INTRAMUSCULAR; INTRAVENOUS at 10:53

## 2017-02-28 RX ADMIN — CEFAZOLIN SODIUM 2 G: 2 SOLUTION INTRAVENOUS at 10:02

## 2017-02-28 RX ADMIN — PRAMIPEXOLE DIHYDROCHLORIDE 2 MG: 1 TABLET ORAL at 22:00

## 2017-02-28 RX ADMIN — ACETAMINOPHEN 500 MG: 500 TABLET, COATED ORAL at 13:05

## 2017-02-28 RX ADMIN — Medication 10 ML: at 22:00

## 2017-02-28 RX ADMIN — PRAMIPEXOLE DIHYDROCHLORIDE 1 MG: 1 TABLET ORAL at 16:00

## 2017-02-28 RX ADMIN — SODIUM CHLORIDE: 9 INJECTION, SOLUTION INTRAVENOUS at 10:12

## 2017-02-28 RX ADMIN — PROPOFOL 50 MCG/KG/MIN: 10 INJECTION, EMULSION INTRAVENOUS at 10:14

## 2017-02-28 RX ADMIN — FENTANYL CITRATE 50 MCG: 50 INJECTION, SOLUTION INTRAMUSCULAR; INTRAVENOUS at 10:57

## 2017-02-28 RX ADMIN — HEPARIN SODIUM 1000 UNITS: 200 INJECTION, SOLUTION INTRAVENOUS at 10:52

## 2017-02-28 NOTE — PROGRESS NOTES
Pt received from cath lab, Surgical site c/d/i; Xray called for imaging. Pt verbalizes understanding of utilizing sling when OOB, no pulling or reaching over head with left arm.  updated by Dr. Dex Velarde at bedside. Order for transfer to step-down.

## 2017-02-28 NOTE — DISCHARGE INSTRUCTIONS
Disposition:  Will need follow-up with device/wound check in 7-10 days in my office. Please contact office at [phone] to confirm appointment. Main Office:    [address] [place] [address]    Restrictions: For affected arm:  No lifting greater than 10 lbs or lifting elbow above shoulder for 4 weeks. Keep incision clean and dry for a total of 72 hours after procedure. Remove dressing in 24 hours if not already removed. Please remove the steristrips (small white adhesive strips over wound) after 7 days if they have not already fallen off. No hot tubs or pools for 2 weeks. OK to shower with \"pad\" dry incision after 72 hours. No driving ideally for 2 weeks due to concern for airbag.

## 2017-02-28 NOTE — ANESTHESIA POSTPROCEDURE EVALUATION
Post-Anesthesia Evaluation and Assessment    Patient: Mony Kim MRN: [id number]  SSN: [id number]    YOB: 1960  Age: 64 y.o. Sex: female       Cardiovascular Function/Vital Signs  Visit Vitals    /63    Pulse 84    Temp 36.7 °C (98 °F)    Resp 16    Ht 5' 5\" (1.651 m)    Wt 87.1 kg (192 lb)    SpO2 98%    Breastfeeding No    BMI 31.95 kg/m2       Patient is status post MAC anesthesia for * No procedures listed *. Nausea/Vomiting: None    Postoperative hydration reviewed and adequate. Pain:  Pain Scale 1: Numeric (0 - 10) (02/28/17 0837)  Pain Intensity 1: 0 (02/28/17 0835)   Managed    Neurological Status: At baseline    Mental Status and Level of Consciousness: Arousable    Pulmonary Status:   O2 Device: Room air (02/28/17 1158)   Adequate oxygenation and airway patent    Complications related to anesthesia: None    Post-anesthesia assessment completed.  No concerns    Signed By: Navid England CRNA     February 28, 2017

## 2017-02-28 NOTE — ROUTINE PROCESS
TRANSFER - OUT REPORT:    Verbal report given to HERIBERTO WEAVER RN(name) on [facility]  being transferred to [facility](unit) for routine progression of care       Report consisted of patients Situation, Background, Assessment and   Recommendations(SBAR). Information from the following report(s) SBAR, Procedure Summary and MAR was reviewed with the receiving nurse. Lines:   Peripheral IV 02/28/17 Right Antecubital (Active)   Site Assessment Clean, dry, & intact 2/28/2017  8:30 AM   Phlebitis Assessment 0 2/28/2017  8:30 AM   Infiltration Assessment 0 2/28/2017  8:30 AM   Dressing Status Clean, dry, & intact 2/28/2017  8:30 AM   Dressing Type Transparent 2/28/2017  8:30 AM   Hub Color/Line Status Pink;Patent; Flushed 2/28/2017  8:30 AM   Alcohol Cap Used No 2/28/2017  8:30 AM        Opportunity for questions and clarification was provided.       Patient transported with:   Bridge

## 2017-02-28 NOTE — PROGRESS NOTES
TRANSFER - OUT REPORT:    Verbal report given to Les Reeves RN(name) on [facility] Casillas  being transferred to [facility](unit) for routine progression of care       Report consisted of patients Situation, Background, Assessment and   Recommendations(SBAR). Information from the following report(s) SBAR, Procedure Summary, Intake/Output, MAR and Cardiac Rhythm NSR was reviewed with the receiving nurse. Lines:   Peripheral IV 02/28/17 Right Antecubital (Active)   Site Assessment Clean, dry, & intact 2/28/2017  8:30 AM   Phlebitis Assessment 0 2/28/2017  8:30 AM   Infiltration Assessment 0 2/28/2017  8:30 AM   Dressing Status Clean, dry, & intact 2/28/2017  8:30 AM   Dressing Type Transparent 2/28/2017  8:30 AM   Hub Color/Line Status Pink;Patent; Flushed 2/28/2017  8:30 AM   Alcohol Cap Used No 2/28/2017  8:30 AM        Opportunity for questions and clarification was provided.       Patient transported with:   Registered Nurse  Tech

## 2017-02-28 NOTE — DISCHARGE SUMMARY
[facility]  -  Progress Note      Discharge Summary     Patient: Vira Wilkerson MRN: [id number]  SSN: [id number]    YOB: 1960  Age: 64 y.o. Sex: female       Admit Date: 2/28/2017    Discharge Date: 2/28/2017      Admission Diagnoses: Syncope and collapse [R55]    Discharge Diagnoses:   Problem List as of 2/28/2017  Date Reviewed: 2/9/2017          Codes Class Noted - Resolved    * (Principal)Pacemaker ICD-10-CM: Z95.0  ICD-9-CM: V45.01  2/28/2017 - Present    Overview Signed 2/28/2017 10:02 AM by Stephanie Chowdhury MD     Medtronic pacemaker 2/28/17             Cardiac device in situ ICD-10-CM: Z95.9  ICD-9-CM: V45.00  11/11/2016 - Present    Overview Signed 11/11/2016  8:21 AM by Stephanie Chowdhury MD     Medtronic Linq subcutaneous loop recorder 11/11/16             Bilateral leg edema ICD-10-CM: R60.0  ICD-9-CM: 782.3  9/12/2016 - Present        Varicose veins of both lower extremities ICD-10-CM: I83.93  ICD-9-CM: 454.9  9/12/2016 - Present        De Quervain's tenosynovitis, left ICD-10-CM: M65.4  ICD-9-CM: 727.04  7/26/2016 - Present        Obesity (BMI 30-39. 9) ICD-10-CM: E66.9  ICD-9-CM: 278.00  6/24/2016 - Present        S/P ablation operation for arrhythmia ICD-10-CM: Z98.890, Z86.79  ICD-9-CM: V45.89  2/8/2016 - Present    Overview Signed 2/8/2016  9:35 AM by Stephanie Chowdhury MD     EP study with mapping of PVC's and ablation along the RVOT. Limited ablation due to low frequency of PVC's.              PVC's (premature ventricular contractions) ICD-10-CM: I49.3  ICD-9-CM: 427.69  1/29/2016 - Present        DDD (degenerative disc disease), lumbar ICD-10-CM: M51.36  ICD-9-CM: 722.52  1/14/2016 - Present        Gastroesophageal reflux disease without esophagitis ICD-10-CM: K21.9  ICD-9-CM: 530.81  6/19/2015 - Present        Hyperlipidemia ICD-10-CM: E78.5  ICD-9-CM: 272.4  11/26/2012 - Present    Overview Signed 9/16/2016  5:11 PM by Valery Rizvi MD     Ca score zero Prediabetes ICD-10-CM: R73.03  ICD-9-CM: 790.29  11/26/2012 - Present               Discharge Condition: stable    Hospital Course:  Pt is a 62yo female who presented for elective explant of her loop recorder as well as implant of dual chamber MRI compatible pacemaker. Pt tolerated procedure well. She will be observed overnight and discharged tomorrow if stable. Diagnosis:  Primary cardiologist Dr. Tesha Rocha  -Subcutaneous implantable loop recorder initially placed for unexplained syncope  -History of syncope likely related to bradycardia although vasovagal cannot be completely excluded. -Recurrent pauses despite stopping beta-blocker.   -Baseline incomplete right bundle branch block with left anterior fascicular block.   -Chronic pain and tension headaches.  -History of PVCs with attempted ablation February 2016.   -History of exercise induced asthma.   -Tape/adhesive/latex allergy. Consults: none    Significant Diagnostic Studies:   Procedure:  1. Implantation dual chamber Medtronic MRI compatible pacemaker. 2. MAC sedation. 3. Left axillary venogram.  4. Explantation of Linq subcutaneous loop recorder    Disposition: home    Discharge Medications:   Current Discharge Medication List      START taking these medications    Details   oxyCODONE-acetaminophen (PERCOCET) 5-325 mg per tablet Take 1 Tab by mouth every four (4) hours as needed. Max Daily Amount: 6 Tabs. Qty: 20 Tab, Refills: 0         CONTINUE these medications which have NOT CHANGED    Details   pramipexole (MIRAPEX) 1 mg tablet TAKE 1 TABLET BY MOUTH THREE TIMES DAILY  Qty: 180 Tab, Refills: 2    Comments: CYCLE FILL MEDICATION. Authorization is required for next refill. calcium-cholecalciferol, D3, (CALTRATE 600+D) tablet Take 1 Tab by mouth daily. fish oil-dha-epa 1,200-144-216 mg cap Take 1 Cap by mouth daily. MULTIVITS-MIN/IRON/FA/LUTEIN (CENTRUM SILVER WOMEN PO) Take 1 Tab by mouth daily.       diclofenac EC (VOLTAREN) 75 mg EC tablet TAKE 1 TAB BY MOUTH TWO (2) TIMES A DAY. Qty: 180 Tab, Refills: 2    Comments: CYCLE FILL MEDICATION. Authorization is required for next refill. EPINEPHrine (EPIPEN) 0.3 mg/0.3 mL (1:1,000) injection 0.3 mL by IntraMUSCular route once as needed for up to 1 dose. Qty: 0.3 mL, Refills: 1      traMADol (ULTRAM) 50 mg tablet Take 1 Tab by mouth every six (6) hours as needed for Pain. Qty: 60 Tab, Refills: 0    Comments: Rx has  - no refills remain             Activity: as directed  Diet: cardiac  Wound Care: as directed    Follow-up Appointments   Procedures    FOLLOW UP VISIT Appointment in: One Week Wound check in one week. Follow up in 4 weeks. Wound check in one week. Follow up in 4 weeks. Standing Status:   Standing     Number of Occurrences:   1     Order Specific Question:   Appointment in     Answer:    One Week       Signed By: Sarrah Siemens, PA     2017

## 2017-02-28 NOTE — IP AVS SNAPSHOT
Summary of Care Report The Summary of Care report has been created to help improve care coordination. Users with access to [employer] or [facility] (Web-based application) may access additional patient information including the Discharge Summary. If you are not currently a [address] user and need more information, please call the number listed below in the [place] [address] section and ask to be connected with Medical Records. Facility Information Name Address Phone [address] [facility] [address] [facility] [phone] Patient Information Patient Name Sex  Isabela Robertson ([id number]) Female 1960 Discharge Information Admitting Provider Service Area Unit Whiteclay MD Bety / [address] [facility] / [phone] Discharge Provider Discharge Date/Time Discharge Disposition Destination (none) 3/1/2017 (Pending) AHR (none) Patient Language Language ENGLISH [13] Problem List as of 3/1/2017  Date Reviewed: 2017 Codes Priority Class Noted - Resolved Hyperlipidemia ICD-10-CM: E78.5 ICD-9-CM: 272.4   2012 - Present Overview Signed 2016  5:11 PM by Nate Maldonado MD  
  Ca score zero Prediabetes ICD-10-CM: R73.03 
ICD-9-CM: 790.29   2012 - Present Gastroesophageal reflux disease without esophagitis ICD-10-CM: K21.9 ICD-9-CM: 530.81   2015 - Present DDD (degenerative disc disease), lumbar ICD-10-CM: M51.36 
ICD-9-CM: 722.52   2016 - Present PVC's (premature ventricular contractions) ICD-10-CM: I49.3 ICD-9-CM: 427.69   2016 - Present S/P ablation operation for arrhythmia ICD-10-CM: Z98.890, Z86.79 
ICD-9-CM: V45.89   2016 - Present Overview Signed 2/8/2016  9:35 AM by Nida Krueger MD  
  EP study with mapping of PVC's and ablation along the RVOT. Limited ablation due to low frequency of PVC's. Obesity (BMI 30-39. 9) ICD-10-CM: E66.9 ICD-9-CM: 278.00   6/24/2016 - Present De Quervain's tenosynovitis, left ICD-10-CM: M65.4 ICD-9-CM: 727.04   7/26/2016 - Present Bilateral leg edema ICD-10-CM: R60.0 ICD-9-CM: 782.3   9/12/2016 - Present Varicose veins of both lower extremities ICD-10-CM: I83.93 
ICD-9-CM: 454.9   9/12/2016 - Present Cardiac device in situ ICD-10-CM: Z95.9 ICD-9-CM: V45.00   11/11/2016 - Present Overview Signed 11/11/2016  8:21 AM by Nida Krueger MD  
  Medtronic Linq subcutaneous loop recorder 11/11/16 * (Principal)Pacemaker ICD-10-CM: Z95.0 ICD-9-CM: V45.01   2/28/2017 - Present Overview Signed 2/28/2017 10:02 AM by Nida Krueger MD  
  Medtronic pacemaker 2/28/17 You are allergic to the following Allergen Reactions Adhesive Tape-Silicones Contact Dermatitis Bee Sting (Sting, Bee) Anaphylaxis Benzoyl Peroxide Rash Betadine (Povidone-Iodine) Rash Chlorhexidine Towelette Rash Codeine Nausea and Vomiting Contrave (Naltrexone-Bupropion) Other (comments) Vision changes Current Discharge Medication List  
  
START taking these medications Dose & Instructions Dispensing Information Comments  
 oxyCODONE-acetaminophen 5-325 mg per tablet Commonly known as:  PERCOCET Dose:  1 Tab Take 1 Tab by mouth every four (4) hours as needed. Max Daily Amount: 6 Tabs. Quantity:  20 Tab Refills:  0 CONTINUE these medications which have CHANGED Dose & Instructions Dispensing Information Comments  
 pramipexole 1 mg tablet Commonly known as:  MIRAPEX What changed:   
- how much to take - when to take this - additional instructions TAKE 1 TABLET BY MOUTH THREE TIMES DAILY Quantity:  180 Tab Refills:  2 CYCLE FILL MEDICATION. Authorization is required for next refill. CONTINUE these medications which have NOT CHANGED Dose & Instructions Dispensing Information Comments  
 calcium-cholecalciferol (D3) tablet Commonly known as:  CALTRATE 600+D Dose:  1 Tab Take 1 Tab by mouth daily. Refills:  0 CENTRUM SILVER WOMEN PO Dose:  1 Tab Take 1 Tab by mouth daily. Refills:  0  
   
 diclofenac EC 75 mg EC tablet Commonly known as:  VOLTAREN  
 TAKE 1 TAB BY MOUTH TWO (2) TIMES A DAY. Quantity:  180 Tab Refills:  2 CYCLE FILL MEDICATION. Authorization is required for next refill. EPINEPHrine 0.3 mg/0.3 mL injection Commonly known as:  Yu Ifeoma Dose:  0.3 mg  
0.3 mL by IntraMUSCular route once as needed for up to 1 dose. Quantity:  0.3 mL Refills:  1  
   
 fish oil-dha-epa 1,200-144-216 mg Cap Dose:  1 Cap Take 1 Cap by mouth daily. Refills:  0  
   
 traMADol 50 mg tablet Commonly known as:  ULTRAM  
 Dose:  50 mg Take 1 Tab by mouth every six (6) hours as needed for Pain. Quantity:  60 Tab Refills:  0 Rx has  - no refills remain Current Immunizations Name Date Influenza Vaccine (Quad) PF  Deferred ()  
 TD Vaccine 10/5/2010 Zoster Vaccine, Live 2013 Surgery Information ID Date/Time Status Primary Surgeon All Procedures Location [id number] 2017 Complete   [facility]IA [facility] - DO NOT SCHEDULE Follow-up Information Follow up With Details Comments Contact Info Jericho Sánchez MD On 3/8/2017 @ 9:15 for wound check office will give patient there 4wks appt,upon there, wound check visit [address] [facility] [address] [facility] [address] [place] [address] 
[phone] Annie Davis MD On 3/6/2017 @ 9:30 [address] [place] [address] 206 [address] [facility] 
[phone] Discharge Instructions Disposition: 
Will need follow-up with device/wound check in 7-10 days in my office. Please contact office at [phone] to confirm appointment. Main Office:   
[address], [address] [place] [address] Restrictions: For affected arm:  No lifting greater than 10 lbs or lifting elbow above shoulder for 4 weeks. Keep incision clean and dry for a total of 72 hours after procedure. Remove dressing in 24 hours if not already removed. Please remove the steristrips (small white adhesive strips over wound) after 7 days if they have not already fallen off. No hot tubs or pools for 2 weeks. OK to shower with \"pad\" dry incision after 72 hours. No driving ideally for 2 weeks due to concern for airbag. Chart Review Routing History Recipient Method Report Sent By Mellisa Manning MD  
Phone: [phone] In Basket IP Auto Routed Notes Jennifer Durham MD [[id number]] 1/15/2016 11:18 AM 01/15/2016

## 2017-02-28 NOTE — PROCEDURES
Procedure:  1. Implantation dual chamber Medtronic MRI compatible pacemaker. 2.  MAC sedation. 3.  Left axillary venogram.  4.  Explantation of Linq subcutaneous loop recorder    Diagnosis:  Primary cardiologist Dr. Juanjose Covarrubias  -Subcutaneous implantable loop recorder initially placed for unexplained syncope  -History of syncope likely related to bradycardia although vasovagal cannot be completely excluded. -Recurrent pauses despite stopping beta-blocker.   -Baseline incomplete right bundle branch block with left anterior fascicular block.   -Chronic pain and tension headaches.  -History of PVCs with attempted ablation February 2016.   -History of exercise induced asthma.   -Tape/adhesive/latex allergy. Procedure:  After informed consent was obtained, the patient was brought to the electrophysiology lab in a fasting and nonsedated state. The left chest was prepped and drapped in the usual sterile fashion using isopropyl alcohol due to chlorhexidine and betadine allergy history. A left axillary venogram revealed vein patency. Local lidocaine was infiltratred below the left clavicle. A 4 cm transverse incision was created on the left chest.  Using electrocautery and blunt dissection, a pocket was made and hemostasis confirmed. Into the left axillary vein was inserted two wires and a 7 and 7 [place] sheath. A right ventricular and right atrial lead was positioned under flouroscopic guidance. The sheaths were removed and leads were sutured in place. The pocket was washed with antibiotic solution. The generator was attached to the leads, placed in the pocket, and sutured in place with 0-silk suture. The pocket was closed with 2-0 vicryl in two deep layers and the skin was closed with 4-0 monocryl. Local lidocaine was infiltrated above the subcutaneous loop recorder. A 1 cm incision was made above the device. The device was exposed and removed from the skin.   The incision was closed with a 4-0 monocryl suture. Steristrips and dressing were applied to both incisions. The patient left the lab in stable condition. Impression:  -Successful implantation of dual chamber Medtronic pacemaker.  -Removal of subcutaneous loop recorder.  -Monitor overnight and discharge tomorrow.

## 2017-02-28 NOTE — ANESTHESIA PREPROCEDURE EVALUATION
Anesthetic History   No history of anesthetic complications            Review of Systems / Medical History  Patient summary reviewed and pertinent labs reviewed    Pulmonary            Asthma        Neuro/Psych   Within defined limits           Cardiovascular            Dysrhythmias       Exercise tolerance: >4 METS     GI/Hepatic/Renal     GERD: well controlled           Endo/Other      Hypothyroidism: well controlled  Obesity and arthritis     Other Findings   Comments: Current Smoker? NO       Elective Surgery? Yes       Abstained from smoking 24 hours prior to anesthesia? N/A    Risk Factors for Postoperative nausea/vomiting:       History of postoperative nausea/vomiting? NO       Female? YES       Motion sickness? NO       Intended opioid administration for postoperative analgesia?   NO           Physical Exam    Airway  Mallampati: II  TM Distance: > 6 cm  Neck ROM: normal range of motion   Mouth opening: Normal     Cardiovascular  Regular rate and rhythm,  S1 and S2 normal,  no murmur, click, rub, or gallop             Dental  No notable dental hx       Pulmonary  Breath sounds clear to auscultation               Abdominal  GI exam deferred       Other Findings            Anesthetic Plan    ASA: 3  Anesthesia type: MAC          Induction: Intravenous  Anesthetic plan and risks discussed with: Patient

## 2017-02-28 NOTE — IP AVS SNAPSHOT
[address] [address] 
[phone] Patient: Aris Mcburney MRN: [id number] :1960 You are allergic to the following Allergen Reactions Adhesive Tape-Silicones Contact Dermatitis Bee Sting (Sting, Bee) Anaphylaxis Benzoyl Peroxide Rash Betadine (Povidone-Iodine) Rash Chlorhexidine Towelette Rash Codeine Nausea and Vomiting Contrave (Naltrexone-Bupropion) Other (comments) Vision changes Immunizations Administered for This Admission Name Date Influenza Vaccine (Quad) PF  Deferred () Recent Documentation Height  
  
  
  
  
  
 1.651 m Emergency Contacts Name Discharge Info Relation Home Work Mobile [id number]  CAREGIVER [3] Spouse [3] [phone] About your hospitalization You were admitted on:  2017 You last received care in the:  [facility] 2 CV STEPDOWN You were discharged on:  2017 Unit phone number:  [phone] Why you were hospitalized Your primary diagnosis was:  Pacemaker Providers Seen During Your Hospitalizations Provider Role Specialty Primary office phone Daniel Bland MD Attending Provider Cardiology [phone] Your Primary Care Physician (PCP) Primary Care Physician Office Phone Office Fax Benedict Blow [phone] Follow-up Information Follow up With Details Comments Contact Info Daniel Bland MD On 3/8/2017 @ 9:15 for wound check office will give patient there 4wks appt,upon there, wound check visit 27 [facility] 200 [facility] 
[phone] China Larson MD On 3/6/2017 @ 9:30 [address] [place] [address] 206 200 [facility] 
[phone] Your Appointments 2017  9:30 AM EST Office Visit with ANA ROSA Velázquez [facility] ([address] [place] [address]) [address] [facility], [address]  
[phone] Wednesday March 08, 2017  9:15 AM EST PROCEDURE with Pacer [facility] ([address] [place] [address]) [place] [phone]  
[phone] Thursday March 16, 2017  9:45 AM EDT  
LAB with [facility] NURSE VISIT [facility] ([address] [place] [address]) [address] [facility], [address] [facility] 200 [facility] [address]  
[phone] Thursday March 23, 2017 10:30 AM EDT PHYSICAL with Ariana Ye MD  
Internist of [address]) [address] [facility], [address]  
[phone] Thursday April 06, 2017  9:20 AM EDT  
POST HOSPITAL with Roney Burgess MD  
[facility] ([address] [place] [address]) [facility] [place] [id number] [phone] Current Discharge Medication List  
  
START taking these medications Dose & Instructions Dispensing Information Comments Morning Noon Evening Bedtime  
 oxyCODONE-acetaminophen 5-325 mg per tablet Commonly known as:  PERCOCET Your next dose is: Today, Tomorrow Other:  _________ Dose:  1 Tab Take 1 Tab by mouth every four (4) hours as needed. Max Daily Amount: 6 Tabs. Quantity:  20 Tab Refills:  0 CONTINUE these medications which have CHANGED Dose & Instructions Dispensing Information Comments Morning Noon Evening Bedtime  
 pramipexole 1 mg tablet Commonly known as:  MIRAPEX What changed:   
- how much to take - when to take this 
- additional instructions Your next dose is: Today, Tomorrow Other:  _________ TAKE 1 TABLET BY MOUTH THREE TIMES DAILY Quantity:  180 Tab Refills:  2 CYCLE FILL MEDICATION. Authorization is required for next refill. CONTINUE these medications which have NOT CHANGED Dose & Instructions Dispensing Information Comments Morning Noon Evening Bedtime  
 calcium-cholecalciferol (D3) tablet Commonly known as:  CALTRATE 600+D Your next dose is: Today, Tomorrow Other:  _________ Dose:  1 Tab Take 1 Tab by mouth daily. Refills:  0 CENTRUM SILVER WOMEN PO Your next dose is: Today, Tomorrow Other:  _________ Dose:  1 Tab Take 1 Tab by mouth daily. Refills:  0  
     
   
   
   
  
 diclofenac EC 75 mg EC tablet Commonly known as:  VOLTAREN Your next dose is: Today, Tomorrow Other:  _________ TAKE 1 TAB BY MOUTH TWO (2) TIMES A DAY. Quantity:  180 Tab Refills:  2 CYCLE FILL MEDICATION. Authorization is required for next refill. EPINEPHrine 0.3 mg/0.3 mL injection Commonly known as:  Hand Jarett Your next dose is: Today, Tomorrow Other:  _________ Dose:  0.3 mg  
0.3 mL by IntraMUSCular route once as needed for up to 1 dose. Quantity:  0.3 mL Refills:  1  
     
   
   
   
  
 fish oil-dha-epa 1,200-144-216 mg Cap Your next dose is: Today, Tomorrow Other:  _________ Dose:  1 Cap Take 1 Cap by mouth daily. Refills:  0  
     
   
   
   
  
 traMADol 50 mg tablet Commonly known as:  ULTRAM  
   
Your next dose is: Today, Tomorrow Other:  _________ Dose:  50 mg Take 1 Tab by mouth every six (6) hours as needed for Pain. Quantity:  60 Tab Refills:  0 Rx has  - no refills remain Where to Get Your Medications Information on where to get these meds will be given to you by the nurse or doctor. ! Ask your nurse or doctor about these medications oxyCODONE-acetaminophen 5-325 mg per tablet Discharge Instructions Disposition: 
Will need follow-up with device/wound check in 7-10 days in my office. Please contact office at [phone] to confirm appointment. Main Office:   
[address], [address] [place] Morales Restrictions: For affected arm:  No lifting greater than 10 lbs or lifting elbow above shoulder for 4 weeks. Keep incision clean and dry for a total of 72 hours after procedure. Remove dressing in 24 hours if not already removed. Please remove the steristrips (small white adhesive strips over wound) after 7 days if they have not already fallen off. No hot tubs or pools for 2 weeks. OK to shower with \"pad\" dry incision after 72 hours. No driving ideally for 2 weeks due to concern for airbag. Discharge Instructions Attachments/References OXYCODONE/ACETAMINOPHEN (BY MOUTH) (ENGLISH) BIVENTRICULAR PACEMAKER: POST-OP (ENGLISH) Discharge Orders None [facility] Announcement We are excited to announce that we are making your provider's discharge notes available to you in DemystData. You will see these notes when they are completed and signed by the physician that discharged you from your recent hospital stay. If you have any questions or concerns about any information you see in DemystData, please call the Health Information Department where you were seen or reach out to your Primary Care Provider for more information about your plan of care. Introducing [facility] & HEALTH SERVICES! Dear Chadwick Dykes: Thank you for requesting a DemystData account. Our records indicate that you already have an active DemystData account. You can access your account anytime at [email or web address] [email or web address]. Nuevo Midstream/Studio Whale Did you know that you can access your hospital and ER discharge instructions at any time in DemystData? You can also review all of your test results from your hospital stay or ER visit. Additional Information If you have questions, please visit the Frequently Asked Questions section of the [employer] website at [email or web address] Remember, MyChart is NOT to be used for urgent needs. For medical emergencies, dial 911. Now available from your iPhone and Android! General Information Please provide this summary of care documentation to your next provider. Patient Signature:  ____________________________________________________________ Date:  ____________________________________________________________  
  
Neil Sport Provider Signature:  ____________________________________________________________ Date:  ____________________________________________________________ More Information Oxycodone/Acetaminophen (By mouth) Acetaminophen (d-chro-x-MIN-oh-fen), Oxycodone Hydrochloride (ht-i-WNH-done giselle-droe-KLOR-elias) Treats moderate to moderately severe pain. This medicine is a narcotic pain reliever. Brand Name(s):Endocet, Percocet, Primlev, Roxicet, Xartemis XR There may be other brand names for this medicine. When This Medicine Should Not Be Used: This medicine is not right for everyone. Do not use it if you had an allergic reaction to acetaminophen or oxycodone, or if you have serious breathing problems (such as severe asthma, hypercarbia, respiratory depression) or paralytic ileus. How to Use This Medicine:  
Capsule, Liquid, Tablet, Long Acting Tablet · Your doctor will tell you how much medicine to use. Do not use more than directed. · Measure the oral liquid medicine with a marked measuring spoon, oral syringe, or medicine cup. · Swallow the extended-release tablet whole. Do not crush, break, or chew it. Do not lick or wet the tablet before placing it in your mouth. Do not give this medicine through a feeding tube. · This medicine should come with a Medication Guide.  Ask your pharmacist for a copy if you do not have one. · Store the medicine in a closed container at room temperature, away from heat, moisture, and direct light. Ask your pharmacist about the best way to dispose of medicine you do not use. Drugs and Foods to Avoid: Ask your doctor or pharmacist before using any other medicine, including over-the-counter medicines, vitamins, and herbal products. · Do not use Xartemis XR if you have used an MAO inhibitor in the past 14 days. · Some medicines and foods can affect how this medicine works. Tell your doctor if you are taking any of the following: ¨ Butorphanol, lamotrigine, nalbuphine, naltrexone, pentazocine, propranolol, zidovudine ¨ Birth control pills, a diuretic (water pill), muscle relaxants, a phenothiazine medicine (chlorpromazine, perphenazine, promethazine, prochlorperazine, thioridazine) · Do not drink alcohol while you are using this medicine. Acetaminophen can damage your liver, and alcohol can increase this risk. Do not take acetaminophen without asking your doctor if you have 3 or more drinks of alcohol every day. · Tell your doctor if you are using any medicine that makes you sleepy, such as allergy medicine or other narcotic pain medicine. Warnings While Using This Medicine: · Tell your doctor if you are pregnant, or if you have kidney disease, liver disease, heart disease, low blood pressure, breathing problems or lung disease, especially if you have asthma, COPD, cor pulmonale, or kyphoscoliosis (curved spine that causes breathing trouble). Tell your doctor if you have urination problems, an underactive thyroid, Hooker disease, pancreas or prostate problems, or a stomach disorder. Tell your doctor if you have a history of head injury or brain damage, seizures, or alcohol or drug abuse. Tell your doctor if you are allergic to codeine. · Do not breastfeed while you are using this medicine, unless otherwise directed by your doctor. · This medicine may cause the following problems: 
¨ Liver problems ¨ Serious skin reactions ¨ Low blood pressure · If you take this medicine for more than a few weeks, do not stop taking it suddenly. Your doctor will need to slowly decrease your dose before you stop it completely. · Get emergency help immediately if you think you may have taken too much of this medicine. Signs of an overdose include shallow breathing, fainting, confusion, nausea, vomiting, pinpoint pupils of the eyes, or pale or blue lips, fingernails, or skin. · This medicine may make you dizzy or drowsy. Do not drive or do anything that could be dangerous until you know how this medicine affects you. · This medicine contains acetaminophen. Read the labels of all other medicines you are using to see if they also contain acetaminophen, or ask your doctor or pharmacist. Venita Pun not use more than 4 grams (4,000 milligrams) total of acetaminophen in one day. · This medicine can be habit-forming. Do not use more than your prescribed dose. Call your doctor if you think your medicine is not working. · This medicine may cause constipation, especially with long-term use. Ask your doctor if you should use a laxative to prevent and treat constipation. · Keep all medicine out of the reach of children. Never share your medicine with anyone. Possible Side Effects While Using This Medicine:  
Call your doctor right away if you notice any of these side effects: · Allergic reaction: Itching or hives, swelling in your face or hands, swelling or tingling in your mouth or throat, chest tightness, trouble breathing · Dark urine or pale stools, nausea, vomiting, loss of appetite, stomach pain, yellow skin or eyes · Extreme weakness, shallow breathing, uneven heartbeat, seizures, sweating, or cold or clammy skin · Lightheadedness, dizziness, or fainting · Trouble breathing If you notice these less serious side effects, talk with your doctor: · Constipation · Headache · Mild lightheadedness, sleepiness, or drowsiness · Mild nausea or vomiting If you notice other side effects that you think are caused by this medicine, tell your doctor. Call your doctor for medical advice about side effects. You may report side effects to FDA at 7-297-HVG-4618 © 2016 [address] is for End User's use only and may not be sold, redistributed or otherwise used for commercial purposes. The above information is an  only. It is not intended as medical advice for individual conditions or treatments. Talk to your doctor, nurse or pharmacist before following any medical regimen to see if it is safe and effective for you. Pacemaker Placement for Heart Failure: What to Expect at Home Your Recovery A pacemaker for heart failure is a biventricular pacemaker (say \"maureen-gerald-TRICK-marielena-christiana\"). Treatment that uses this type of pacemaker is called cardiac resynchronization therapy (CRT). When you have heart failure, the lower chambers of your heart may not pump at the same time. The pacemaker sends painless electrical signals to your heart. These signals make the chambers pump at the same time. This can help your heart pump blood better and help you feel better. Your pacemaker may be combined with an ICD, or implantable cardioverter-defibrillator. It can control abnormal heart rhythms. This can prevent sudden death. Your chest may be sore where the doctor made the cut (incision) and put in the pacemaker. You also may have a bruise and mild swelling. These symptoms usually get better in 1 to 2 weeks. You may feel a hard ridge along the incision. This usually gets softer in the months after surgery. You may be able to see or feel the outline of the pacemaker under your skin. You will probably be able to go back to work or your usual routine 1 to 2 weeks after surgery. Pacemaker batteries usually last 5 to 15 years.  Your doctor will talk to you about how often you will need to have your pacemaker checked. You can safely use most household and office electronics such as kitchen appliances, electric power tools, and computers. You will need to stay away from things with strong magnetic and electrical fields such as an MRI machine (unless your pacemaker is safe for an MRI), welding equipment, and power generators. You can use a cell phone, but keep it at least 6 inches away from your pacemaker. Check with your doctor about what you need to stay away from, what you need to use with care, and what is okay to use. This care sheet gives you a general idea about how long it will take for you to recover. But each person recovers at a different pace. Follow the steps below to get better as quickly as possible. How can you care for yourself at home? Activity · Rest when you feel tired. · Be active. Walking is a good choice. · For 4 to 6 weeks: ¨ Avoid activities that strain your chest or upper arm muscles. This includes pushing a  or vacuum, mopping floors, swimming, or swinging a golf club or tennis racquet. ¨ Do not raise your arm (the one on the side of your body where the pacemaker is located) above your shoulder. ¨ Allow your body to heal. Don't move quickly or lift anything heavy until you are feeling better. · Many people are able to return to work within 1 to 2 weeks after surgery. · Ask your doctor when it is okay for you to have sex. Diet · You can eat your normal diet. If your stomach is upset, try bland, low-fat foods like plain rice, broiled chicken, toast, and yogurt. Medicines · Your doctor will tell you if and when you can restart your medicines. He or she will also give you instructions about taking any new medicines. · If you take aspirin or some other blood thinner, be sure to talk to your doctor.  He or she will tell you if and when to start taking this medicine again. Make sure that you understand exactly what your doctor wants you to do. · Be safe with medicines. Read and follow all instructions on the label. ¨ If the doctor gave you a prescription medicine for pain, take it as prescribed. ¨ If you are not taking a prescription pain medicine, ask your doctor if you can take an over-the-counter medicine. ¨ Do not take aspirin, ibuprofen (Advil, Motrin), naproxen (Aleve), or other nonsteroidal anti-inflammatory drugs (NSAIDs) unless your doctor says it is okay. · If your doctor prescribed antibiotics, take them as directed. Do not stop taking them just because you feel better. You need to take the full course of antibiotics. Incision care · If you have strips of tape on the incision, leave the tape on for a week or until it falls off. · Keep the incision dry while it heals. Your doctor may recommend sponge baths for about 7 days, but do not get the incision wet. Your doctor will let you know when you may take showers. After a shower, pat the incision dry. · Don't use hydrogen peroxide or alcohol on the incision, which can slow healing. You may cover the area with a gauze bandage if it oozes fluid or rubs against clothing. Change the bandage every day. · Do not take a bath or get into a hot tub for the first 2 weeks, or until your doctor tells you it is okay. Other instructions · Keep a medical ID card with you at all times that says you have a pacemaker. The card should include the  and model information. · Wear medical alert jewelry stating that you have a pacemaker. You can buy this at most [employer]. · Check your pulse as directed by your doctor. · Have your pacemaker checked as often as your doctor recommends. In some cases, this may be done over the phone or the Internet. Your doctor will give you instructions about how to do this. Follow-up care is a key part of your treatment and safety.  Be sure to make and go to all appointments, and call your doctor if you are having problems. It's also a good idea to know your test results and keep a list of the medicines you take. When should you call for help? Call 911 anytime you think you may need emergency care. For example, call if: 
· You passed out (lost consciousness). · You have severe trouble breathing. · You have sudden chest pain and shortness of breath, or you cough up blood. · You have symptoms of a heart attack. These may include: ¨ Chest pain or pressure, or a strange feeling in the chest. 
¨ Sweating. ¨ Shortness of breath. ¨ Nausea or vomiting. ¨ Pain, pressure, or a strange feeling in the back, neck, jaw, or upper belly or in one or both shoulders or arms. ¨ Lightheadedness or sudden weakness. ¨ A fast or irregular heartbeat. After you call 911, the  may tell you to chew 1 adult-strength or 2 to 4 low-dose aspirin. Wait for an ambulance. Do not try to drive yourself. · You have symptoms of a stroke. These may include: 
¨ Sudden numbness, tingling, weakness, or loss of movement in your face, arm, or leg, especially on only one side of your body. ¨ Sudden vision changes. ¨ Sudden trouble speaking. ¨ Sudden confusion or trouble understanding simple statements. ¨ Sudden problems with walking or balance. ¨ A sudden, severe headache that is different from past headaches. Call your doctor now or seek immediate medical care if: 
· Your heartbeat feels very fast or slow, skips beats, or flutters. · You are dizzy or lightheaded, or you feel like you may faint. · You have new or increased shortness of breath. · You have pain that does not get better after you take pain medicine. · You have loose stitches, or your incision comes open. · Bright red blood has soaked through the bandage over your incision. · You have signs of infection, such as: 
¨ Increased pain, swelling, warmth, or redness. ¨ Red streaks leading from the incision. ¨ Pus draining from the incision. ¨ A fever. · You have signs of a blood clot, such as: 
¨ Pain in your calf, back of the knee, thigh, or groin. ¨ Redness and swelling in your leg or groin. Watch closely for changes in your health, and be sure to contact your doctor if: 
· You have any problems with your pacemaker. Where can you learn more? Go to http://mima-jose c.info/. Enter T984 in the search box to learn more about \"Pacemaker Placement for Heart Failure: What to Expect at Home. \" Current as of: August 4, 2016 Content Version: 11.1 © 9384-5141 [employer]. Care instructions adapted under license by [employer] (which disclaims liability or warranty for this information). If you have questions about a medical condition or this instruction, always ask your healthcare professional. [address] any warranty or liability for your use of this information.

## 2017-02-28 NOTE — ROUTINE PROCESS
TRANSFER - IN REPORT:    Verbal report received from [facility] RN(name) on [facility]  being received from [facility](unit) for routine progression of care      Report consisted of patients Situation, Background, Assessment and   Recommendations(SBAR). Information from the following report(s) SBAR, Procedure Summary and MAR was reviewed with the receiving nurse. Opportunity for questions and clarification was provided. Assessment completed upon patients arrival to unit and care assumed.

## 2017-02-28 NOTE — IP AVS SNAPSHOT
Current Discharge Medication List  
  
Take these medications at their scheduled times Dose & Instructions Dispensing Information Comments Morning Noon Evening Bedtime  
 calcium-cholecalciferol (D3) tablet Commonly known as:  CALTRATE 600+D Your next dose is: Today, Tomorrow Other:  ____________ Dose:  1 Tab Take 1 Tab by mouth daily. Refills:  0 CENTRUM SILVER WOMEN PO Your next dose is: , Tomorrow Other:  ____________ Dose:  1 Tab Take 1 Tab by mouth daily. Refills:  0  
     
   
   
   
  
 fish oil-dha-epa 1,200-144-216 mg Cap Your next dose is: Today, Tomorrow Other:  ____________ Dose:  1 Cap Take 1 Cap by mouth daily. Refills:  0 Take these medications as needed Dose & Instructions Dispensing Information Comments Morning Noon Evening Bedtime EPINEPHrine 0.3 mg/0.3 mL injection Commonly known as:  Anila Noah Your next dose is: , Tomorrow Other:  ____________ Dose:  0.3 mg  
0.3 mL by IntraMUSCular route once as needed for up to 1 dose. Quantity:  0.3 mL Refills:  1  
     
   
   
   
  
 oxyCODONE-acetaminophen 5-325 mg per tablet Commonly known as:  PERCOCET Your next dose is: Today, Tomorrow Other:  ____________ Dose:  1 Tab Take 1 Tab by mouth every four (4) hours as needed. Max Daily Amount: 6 Tabs. Quantity:  20 Tab Refills:  0  
     
   
   
   
  
 traMADol 50 mg tablet Commonly known as:  ULTRAM  
   
Your next dose is: , Tomorrow Other:  ____________ Dose:  50 mg Take 1 Tab by mouth every six (6) hours as needed for Pain. Quantity:  60 Tab Refills:  0 Rx has  - no refills remain Take these medications as directed Dose & Instructions Dispensing Information Comments Morning Noon Evening Bedtime  
 diclofenac EC 75 mg EC tablet Commonly known as:  VOLTAREN Your next dose is: Today, Tomorrow Other:  ____________ TAKE 1 TAB BY MOUTH TWO (2) TIMES A DAY. Quantity:  180 Tab Refills:  2 CYCLE FILL MEDICATION. Authorization is required for next refill. pramipexole 1 mg tablet Commonly known as:  MIRAPEX Your next dose is: Today, Tomorrow Other:  ____________ TAKE 1 TABLET BY MOUTH THREE TIMES DAILY Quantity:  180 Tab Refills:  2 CYCLE FILL MEDICATION. Authorization is required for next refill. Where to Get Your Medications Information about where to get these medications is not yet available ! Ask your nurse or doctor about these medications  
  oxyCODONE-acetaminophen 5-325 mg per tablet

## 2017-03-01 ENCOUNTER — APPOINTMENT (OUTPATIENT)
Dept: GENERAL RADIOLOGY | Age: 57
End: 2017-03-01
Attending: INTERNAL MEDICINE
Payer: COMMERCIAL

## 2017-03-01 VITALS
DIASTOLIC BLOOD PRESSURE: 77 MMHG | OXYGEN SATURATION: 98 % | HEIGHT: 65 IN | TEMPERATURE: 97.8 F | BODY MASS INDEX: 31.99 KG/M2 | WEIGHT: 192 LBS | HEART RATE: 74 BPM | SYSTOLIC BLOOD PRESSURE: 128 MMHG | RESPIRATION RATE: 18 BRPM

## 2017-03-01 LAB
ATRIAL RATE: 76 BPM
CALCULATED P AXIS, ECG09: -4 DEGREES
CALCULATED R AXIS, ECG10: -13 DEGREES
CALCULATED T AXIS, ECG11: 10 DEGREES
DIAGNOSIS, 93000: NORMAL
P-R INTERVAL, ECG05: 196 MS
Q-T INTERVAL, ECG07: 370 MS
QRS DURATION, ECG06: 98 MS
QTC CALCULATION (BEZET), ECG08: 416 MS
VENTRICULAR RATE, ECG03: 76 BPM

## 2017-03-01 PROCEDURE — 71020 XR CHEST PA LAT: CPT

## 2017-03-01 PROCEDURE — 93005 ELECTROCARDIOGRAM TRACING: CPT

## 2017-03-01 PROCEDURE — 74011250636 HC RX REV CODE- 250/636: Performed by: INTERNAL MEDICINE

## 2017-03-01 PROCEDURE — 99218 HC RM OBSERVATION: CPT

## 2017-03-01 RX ADMIN — CEFAZOLIN SODIUM 2 G: 2 SOLUTION INTRAVENOUS at 02:58

## 2017-03-01 RX ADMIN — Medication 10 ML: at 07:00

## 2017-03-01 NOTE — PROGRESS NOTES
Bedside turnover given to me by HUY Hung. Pt is on the cardiac monitor, denies shortness of breath and denies chest pain.

## 2017-03-01 NOTE — PROGRESS NOTES
met with patient's , completed the initial Spiritual Assessment of the patient, and offered Pastoral Care, see flow sheets for interventions.  confirmed patient is [employer]. She is out of room at a medical test. Pastoral support provided. Patient does not have any Yazidi/cultural needs that will affect patients preferences in health care. Chart reviewed. Chaplains will continue to follow and will provide pastoral care on an as needed/as requested basis. Rayshawn Shafer MDiv.   Board Certified [employer] [phone]

## 2017-03-01 NOTE — PROGRESS NOTES
Patient discharging at home at this time, patient  also present, discharge instructions reviewed with patient and patient verbalizes understanding of discharge instructions and agrees with plan of care,Reviewed medications and follow up appointments. Patient refuses flu vaccine and wishes not to have.

## 2017-03-02 ENCOUNTER — PATIENT OUTREACH (OUTPATIENT)
Dept: INTERNAL MEDICINE CLINIC | Age: 57
End: 2017-03-02

## 2017-03-02 NOTE — PROGRESS NOTES
NNTOCIP Contact made: within 2 business days post discharge    Patient admitted to [facility] on 2/28/2017 to 3/1/2017, for a scheduled procedure. Patient verified two patient identifiers. Introduced self, role and reason for call. Home medication reconciliation completed and allergies reviewed. Patient presenting symptoms:   Syncope and collapse    Procedures:  1. Implantation dual chamber Medtronic MRI compatible pacemaker. 2. MAC sedation. 3. Left axillary venogram.  4. Explantation of Linq subcutaneous loop recorder    Patient denies:  Bleeding  SOB  Dizziness  Lightheadedness  Fever  Chills  Redness  Swelling    Patient reports:  Pain 1/10 on a scale of 0 to 10 as tolerable  Wound open to air with steri strips in place  Last bowel movement 3/1/2017    Barriers:   Financial: None identified at this time   Patients comprehension of disease: Patient verbalizes understanding of discharge plan and special follow up. Transportation: Self    Resources:  Spouse    DME:  None    ADL's:  Patient is independent of all ADL's. Patient reported the following on ADL's:  Feeds self: YES  Ambulates: YES      Self grooming: YES  Toileting: YES    Plan of care:  1. Take medications as prescribed  2. Attend all follow up appointments  3. Monitor for bleeding  4. Monitor for infection    Adherence to previous treatment and likelihood for follow up:  Patient verbalizes understanding of discharge plan and special follow up. Appointments:  MIKAYLA THOMPSON [id number] 3/6/2017 at 0930 with Carlota Alarcon PA-C  3/8/2017 at 0915 for wound check    Patient verbalizes understanding self-management of medications, and when to seek medical attention from PCP. Patient instructed to bring all medications with them to their next appointment. Patient was given the opportunity to ask questions. Contact information was provided for future reference or further questions.

## 2017-03-06 ENCOUNTER — OFFICE VISIT (OUTPATIENT)
Dept: INTERNAL MEDICINE CLINIC | Age: 57
End: 2017-03-06

## 2017-03-06 VITALS
BODY MASS INDEX: 32.49 KG/M2 | TEMPERATURE: 97.8 F | OXYGEN SATURATION: 99 % | HEART RATE: 88 BPM | DIASTOLIC BLOOD PRESSURE: 82 MMHG | SYSTOLIC BLOOD PRESSURE: 120 MMHG | HEIGHT: 65 IN | WEIGHT: 195 LBS

## 2017-03-06 DIAGNOSIS — I49.3 PVC'S (PREMATURE VENTRICULAR CONTRACTIONS): Primary | ICD-10-CM

## 2017-03-06 DIAGNOSIS — Z95.0 PACEMAKER: ICD-10-CM

## 2017-03-06 RX ORDER — PRAMIPEXOLE DIHYDROCHLORIDE 1 MG/1
2 TABLET ORAL
COMMUNITY
End: 2018-01-24 | Stop reason: SDUPTHER

## 2017-03-06 RX ORDER — TRAMADOL HYDROCHLORIDE 50 MG/1
50 TABLET ORAL
Qty: 30 TAB | Refills: 0 | Status: SHIPPED | OUTPATIENT
Start: 2017-03-06 | End: 2017-05-02

## 2017-03-06 NOTE — PROGRESS NOTES
HPI/History  Jolly Strange is a 64 y.o.  female who presents for hospital f/u, 2/28-3/1. NN contacted 3/2. Pt with CV hx noted below and underwent pacemaker placement with Dr. Ivette Amaral. Has been doing very well and denies any cardiopulmonary sxs. No dizziness, syncope, or similar. Denies any signs of local or systemic infection. Has wound and pacemaker check with cardio on 3/8. Appt with Dr. Ivette Amaral on 4/6. No other sxs or complaints. Patient Active Problem List   Diagnosis Code    Hyperlipidemia E78.5    Prediabetes R73.03    Gastroesophageal reflux disease without esophagitis K21.9    DDD (degenerative disc disease), lumbar M51.36    PVC's (premature ventricular contractions) I49.3    S/P ablation operation for arrhythmia Z98.890, Z86.79    Obesity (BMI 30-39. 9) E66.9    De Quervain's tenosynovitis, left M65.4    Bilateral leg edema R60.0    Varicose veins of both lower extremities I83.93    Cardiac device in situ Z95.9    Pacemaker Z95.0     Past Medical History:   Diagnosis Date    Arrhythmia     PVC 10% burden; s/p partial ablation Dr Ivette Amaral 2/16    Asthma     h/o exercise induced    Cardiac Agatston CAC score, <100 02/29/2016    Coronary calcium score 0.    Cardiac echocardiogram 01/19/2016    EF 55-60%. No RWMA. Indeterminate diastolic fx. RVSP 26 mmHg. Mild MR.  Cardiac Holter monitor study 12/21/2015    Sinus rhythm, avg HR 82 bpm (range  bpm). Numerous PVCs. A few short runs of VT.  Cardiovascular LE venous duplex 07/08/2016    No DVT bilaterally.     Cholelithiasis 2008    on US    Chronic back pain     Dr. Karen Scruggs tenosynovitis     Dr Malik Blackburn Dupuytren's contracture of both hands     Dr Malik Blackburn FHx: heart disease     GERD (gastroesophageal reflux disease) 2/99    on UGI    History of palpitations 2005    Hyperlipidemia     calculated 10 year risk score was 1.8% (12/15); 2.2% (10/16)    Hypovitaminosis D     Myofacial pain     neck and thoracic    OA (osteoarthritis)     lumbar DDD    Obesity     peak weight 200 lbs, bmi 33.3 from 5/13; qsymia ineffective, intol contrave, delcined med supervised wt loss; 24h U petar nl    Osteopenia 2007    t score -1 spine, -0.2 hip Dr. Hull Shadow Pericardial cyst 12/13    calcified on CT    Prediabetes 11/12    Tachycardia     neg eval Dr. Smita Mccormick 2004    Tension headache     Thyroid nodule 8/12    3mm; 5/13, 12/13, 11/14, 1/16 no change    Trigger finger of both hands     Dr Jaren Grider     Past Surgical History:   Procedure Laterality Date    CARDIAC SURG PROCEDURE UNLIST  2004    Holter negative    CARDIAC SURG PROCEDURE UNLIST  2004    echo nl lv, ef 65%    HAND/FINGER SURGERY UNLISTED      HX BUNIONECTOMY  2005    bilateral    HX CARPAL TUNNEL RELEASE  12/08    HX COLONOSCOPY      HBV 2011 negative    HX CYST REMOVAL  2007    right lumbar     HX ORTHOPAEDIC      trigger finger release Dr Magno Lopez    [address]      heart ablation   [address] UNLIST  3/12    venous doppler negative     Social History     Social History    Marital status:      Spouse name: N/A    Number of children: 1    Years of education: N/A     Occupational History    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      Social History Main Topics    Smoking status: Former Smoker     Packs/day: 1.00     Years: 20.00     Quit date: 1/14/1993    Smokeless tobacco: Never Used      Comment: 1994    Alcohol use 0.0 oz/week     0 Standard drinks or equivalent per week      Comment: rarely    Drug use: No    Sexual activity: Not on file     Other Topics Concern    Not on file     Social History Narrative     Family History   Problem Relation Age of Onset    Hypertension Mother     Diabetes Mother     Stroke Mother     Arthritis-osteo Mother     Cancer Mother      breast    Elevated Lipids Mother     Diabetes Father     Arthritis-osteo Father     Heart Disease Father     Elevated Lipids Father  Arthritis-osteo Sister     Migraines Sister     Alcohol abuse Brother     Arthritis-osteo Brother      Current Outpatient Prescriptions   Medication Sig    traMADol (ULTRAM) 50 mg tablet Take 1 Tab by mouth every six (6) hours as needed for Pain.  pramipexole (MIRAPEX) 1 mg tablet Take 2 mg by mouth nightly.  calcium-cholecalciferol, D3, (CALTRATE 600+D) tablet Take 1 Tab by mouth daily.  fish oil-dha-epa 1,200-144-216 mg cap Take 1 Cap by mouth daily.  MULTIVITS-MIN/IRON/FA/LUTEIN (CENTRUM SILVER WOMEN PO) Take 1 Tab by mouth daily.  EPINEPHrine (EPIPEN) 0.3 mg/0.3 mL (1:1,000) injection 0.3 mL by IntraMUSCular route once as needed for up to 1 dose.  oxyCODONE-acetaminophen (PERCOCET) 5-325 mg per tablet Take 1 Tab by mouth every four (4) hours as needed. Max Daily Amount: 6 Tabs. No current facility-administered medications for this visit. Allergies   Allergen Reactions    Adhesive Tape-Silicones Contact Dermatitis    Bee Sting [Sting, Bee] Anaphylaxis    Benzoyl Peroxide Rash    Betadine [Povidone-Iodine] Rash    Chlorhexidine Towelette Rash    Codeine Nausea and Vomiting    Contrave [Naltrexone-Bupropion] Other (comments)     Vision changes       Review of Systems  Uses tramadol prn but rarely for general aches/pain. Current tabs have  and requesting refill at reduced quantity. Significant findings included in HPI. Physical Examination  Visit Vitals    /82    Pulse 88    Temp 97.8 °F (36.6 °C) (Oral)    Ht 5' 5\" (1.651 m)    Wt 195 lb (88.5 kg)    SpO2 99%    BMI 32.45 kg/m2       General - Alert and in no acute distress. Pt appears well, comfortable, and in good spirits. Very pleasant, engaging. Nontoxic. Not anxious, non-diaphoretic. Mental status - Appropriate mood, behavior, speech content, dress, and thought processes. Pulm - No tachypnea, retractions, or cyanosis. Good respiratory effort. Clear to auscultation bilat.    Cardiovascular - Normal rate, regular rhythm. Chest wall - Left sided pacemaker. Steristrips in place. Site looks to be healing well with no signs of infection/cellulitis. Bruising in various stages of healing; no signs of hemorrhage. No other findings. Assessment and Plan  1. Hospital f/u s/p pacemaker placement - Pt appears to be doing well. No complaints and no signs of local or systemic infection. Will keep f/u with cardio as scheduled. Return here if needed. Further planning as warranted. Pt happily agrees with plan. PLEASE NOTE:   This document has been produced using voice recognition software. Unrecognized errors in transcription may be present.     [facility] of [address] [place] [address]  [phone]  3/6/2017

## 2017-03-06 NOTE — MR AVS SNAPSHOT
Visit Information Date & Time Provider Department Dept. Phone Encounter #  
 3/6/2017  9:30 AM Dia Vasquez Internist of [address] [place] [address] [id number] Your Appointments 3/8/2017  9:15 AM  
PROCEDURE with Pacer [facility]v [facility] ([address] [place] [address]) Appt Note: wound check- Pacemaker [place] [address] [address] [address] [phone] [address]  
  
    
 3/16/2017  9:45 AM  
LAB with [facility] NURSE VISIT Internist of [facility] ([address] [place] [address]) Appt Note: labs for pe rd  
 [address] [facility], [address] Critical access hospital [address]  
  
   
 [address], [address]  
  
    
 3/23/2017 10:30 AM  
PHYSICAL with Patria Saint, MD  
Internist of [address]) Appt Note: pe w/pap, rd  
 [address] [facility], [address] [id number] [address]  
  
   
 [address], [address]  
  
    
 4/6/2017  9:20 AM  
POST HOSPITAL with Rajendra Banks MD  
[facility] ([address] [place] [address]) Appt Note: 6 month follow up with EKG; . .. [place] [address] [address] [address]  
[phone] [address] Upcoming Health Maintenance Date Due DTaP/Tdap/Td series (1 - Tdap) 10/6/2010 FOBT Q 1 YEAR AGE 50-75 12/11/2010 PAP AKA CERVICAL CYTOLOGY 11/21/2015 INFLUENZA AGE 9 TO ADULT 8/1/2016 BREAST CANCER SCRN MAMMOGRAM 4/9/2017 Allergies as of 3/6/2017  Review Complete On: 3/6/2017 By: Ophelia De Souza LPN Severity Noted Reaction Type Reactions Adhesive Tape-silicones  37/86/5601    Contact Dermatitis Bee Sting [Sting, Bee]    Anaphylaxis Benzoyl Peroxide    Rash Betadine [Povidone-iodine]  02/08/2016    Rash Chlorhexidine Towelette  11/11/2016    Rash Codeine    Nausea and Vomiting Contrave [Naltrexone-bupropion]  06/19/2015    Other (comments) Vision changes Current Immunizations  Reviewed on 8/13/2012 Name Date  
 TD Vaccine 10/5/2010 Zoster Vaccine, Live 11/27/2013 Not reviewed this visit Vitals BP Pulse Temp Height(growth percentile) Weight(growth percentile) SpO2  
 120/82 88 97.8 °F (36.6 °C) (Oral) 5' 5\" (1.651 m) 195 lb (88.5 kg) 99% BMI OB Status Smoking Status 32.45 kg/m2 Postmenopausal Former Smoker BMI and BSA Data Body Mass Index Body Surface Area  
 32.45 kg/m 2 2.01 m 2 Preferred Pharmacy Pharmacy Name Phone [facility] [address] [place] [address] [phone] Your Updated Medication List  
  
   
This list is accurate as of: 3/6/17  9:47 AM.  Always use your most recent med list.  
  
  
  
  
 calcium-cholecalciferol (D3) tablet Commonly known as:  CALTRATE 600+D Take 1 Tab by mouth daily. CENTRUM SILVER WOMEN PO Take 1 Tab by mouth daily. EPINEPHrine 0.3 mg/0.3 mL injection Commonly known as:  EPIPEN  
0.3 mL by IntraMUSCular route once as needed for up to 1 dose. fish oil-dha-epa 1,200-144-216 mg Cap Take 1 Cap by mouth daily. oxyCODONE-acetaminophen 5-325 mg per tablet Commonly known as:  PERCOCET Take 1 Tab by mouth every four (4) hours as needed. Max Daily Amount: 6 Tabs. pramipexole 1 mg tablet Commonly known as:  MIRAPEX TAKE 1 TABLET BY MOUTH THREE TIMES DAILY  
  
 traMADol 50 mg tablet Commonly known as:  ULTRAM  
Take 1 Tab by mouth every six (6) hours as needed for Pain. Prescriptions Printed Refills  
 traMADol (ULTRAM) 50 mg tablet 0 Sig: Take 1 Tab by mouth every six (6) hours as needed for Pain. Class: Print Route: Oral  
  
Introducing \[facility]\"" & HEALTH SERVICES! Dear Jayshree Win: Thank you for requesting a [employer] account. Our records indicate that you already have an active [employer] account. You can access your account anytime at [email or web address] Did you know that you can access your hospital and ER discharge instructions at any time in [employer]? You can also review all of your test results from your hospital stay or ER visit. Additional Information If you have questions, please visit the Frequently Asked Questions section of the [employer] website at [email or web address] Remember, [employer] is NOT to be used for urgent needs. For medical emergencies, dial 911. Now available from your iPhone and Android! Please provide this summary of care documentation to your next provider. Your primary care clinician is listed as Drinda Epley. If you have any questions after today's visit, please call [phone].

## 2017-03-08 ENCOUNTER — CLINICAL SUPPORT (OUTPATIENT)
Dept: CARDIOLOGY CLINIC | Age: 57
End: 2017-03-08

## 2017-03-08 DIAGNOSIS — I45.5 SINUS PAUSE: Primary | ICD-10-CM

## 2017-03-08 DIAGNOSIS — Z95.0 PACEMAKER: ICD-10-CM

## 2017-03-08 NOTE — PROGRESS NOTES
I have personally seen and evaluated the device findings. Interrogation reviewed and I agree with assessment.     Rafa Mayer

## 2017-03-08 NOTE — PROGRESS NOTES
Please see scanned document    Wound was free of redness, and drainage. It was not warm to the touch. There was mild swelling. Patient instructed to continue to monitor for signs and symptoms of infection. Call if swelling worsens or fails to improve.

## 2017-03-19 NOTE — PROGRESS NOTES
64 y.o. WHITE OR  female who presents for RPE    She had placement of the pacemaker and no more syncopal episodes. No other cardiovascular complaints     She has not been able to exercise much outside of taking short walks in the evenings. Trying to watch the diet but no success w attempts at wt loss    No GI or  complaints and she's not using any acid blocker. The arthritis remains an issue and she's off nsaid, had declined cymbalta in past, wants to see another ortho    Dr Reginaldo Sotomayor did not recommend any procedures for the swelling which is actually improved    The allergies are flaring right now and not responding to antihis, she declines nasal steroid, does not recall trying singulair in past    Past Medical History:   Diagnosis Date    Arrhythmia     PVC 10% burden; s/p partial ablation Dr Tatiana Omalley 2/16    Asthma     h/o exercise induced    Cardiac Agatston CAC score, <100 02/29/2016    Coronary calcium score 0.    Cardiac echocardiogram 01/19/2016    EF 55-60%. No RWMA. Indeterminate diastolic fx. RVSP 26 mmHg. Mild MR.  Cardiac Holter monitor study 12/21/2015    Sinus rhythm, avg HR 82 bpm (range  bpm). Numerous PVCs. A few short runs of VT.  Cardiovascular LE venous duplex 07/08/2016    No DVT bilaterally.     Cholelithiasis 2008    on US    Chronic back pain     Dr. Narinder Howell    Chronic venous insufficiency 2016    Dr Shireen Felipe tenosynovitis     Dr Yasmin Zurita Dupuytren's contracture of both hands     Dr Yasmin Zurita FHx: heart disease     GERD (gastroesophageal reflux disease) 2/99    on UGI    History of palpitations 2005    Hyperlipidemia     calculated 10 year risk score was 1.8% (12/15); 2.2% (10/16)    Hypovitaminosis D     Myofacial pain     neck and thoracic    OA (osteoarthritis)     lumbar DDD    Obesity     peak weight 200 lbs, bmi 33.3 from 5/13; qsymia ineffective, intol contrave, delcined med supervised wt loss; 24h U petar nl    Osteopenia 2007    t score -1 spine, -0.2 hip Dr. Kenya Brito Pericardial cyst 12/13    calcified on CT    Prediabetes 11/12    Tachycardia     neg eval Dr. Kirsten Payne 2004    Tension headache     Thyroid nodule 8/12    3mm; 5/13, 12/13, 11/14, 1/16 no change    Trigger finger of both hands     Dr Andrzej Orozco     Past Surgical History:   Procedure Laterality Date    CARDIAC SURG PROCEDURE UNLIST  2004    Holter negative    CARDIAC SURG PROCEDURE UNLIST  2004    echo nl lv, ef 65%    HAND/FINGER SURGERY UNLISTED      HX BUNIONECTOMY  2005    bilateral    HX CARPAL TUNNEL RELEASE  12/08    HX COLONOSCOPY      HBV 2011 negative    HX CYST REMOVAL  2007    right lumbar     HX ORTHOPAEDIC      trigger finger release Dr Malini Banda    [facility] [facility]      heart ablation   [address] UNLIST  3/12    venous doppler negative     Social History     Social History    Marital status:      Spouse name: N/A    Number of children: 1    Years of education: N/A     Occupational History    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      Social History Main Topics    Smoking status: Former Smoker     Packs/day: 1.00     Years: 20.00     Quit date: 1/14/1993    Smokeless tobacco: Never Used      Comment: 1994    Alcohol use 0.0 oz/week     0 Standard drinks or equivalent per week      Comment: rarely    Drug use: No    Sexual activity: Not on file     Other Topics Concern    Not on file     Social History Narrative     Family History   Problem Relation Age of Onset    Hypertension Mother     Diabetes Mother     Stroke Mother     Arthritis-osteo Mother     Cancer Mother      breast    Elevated Lipids Mother     Diabetes Father     Arthritis-osteo Father     Heart Disease Father     Elevated Lipids Father    [place] Sister     Migraines Sister     Alcohol abuse Brother     Arthritis-osteo Brother      Immunization History   Administered Date(s) Administered    TD Vaccine 10/05/2010    Zoster Vaccine, Live 11/27/2013     Current Outpatient Prescriptions   Medication Sig    montelukast (SINGULAIR) 10 mg tablet Take 1 Tab by mouth daily.  traMADol (ULTRAM) 50 mg tablet Take 1 Tab by mouth every six (6) hours as needed for Pain.  pramipexole (MIRAPEX) 1 mg tablet Take 2 mg by mouth nightly.  calcium-cholecalciferol, D3, (CALTRATE 600+D) tablet Take 1 Tab by mouth daily.  fish oil-dha-epa 1,200-144-216 mg cap Take 1 Cap by mouth daily.  MULTIVITS-MIN/IRON/FA/LUTEIN (CENTRUM SILVER WOMEN PO) Take 1 Tab by mouth daily.  EPINEPHrine (EPIPEN) 0.3 mg/0.3 mL (1:1,000) injection 0.3 mL by IntraMUSCular route once as needed for up to 1 dose. No current facility-administered medications for this visit. Allergies   Allergen Reactions    Adhesive Tape-Silicones Contact Dermatitis    Bee Sting [Sting, Bee] Anaphylaxis    Benzoyl Peroxide Rash    Betadine [Povidone-Iodine] Rash    Chlorhexidine Towelette Rash    Codeine Nausea and Vomiting    Contrave [Naltrexone-Bupropion] Other (comments)     Vision changes     REVIEW OF SYSTEMS: mammo 4/15, gyn 11/12, colo 2011 HBV, DEXA 12/15  Ophtho  no vision change or eye pain  Oral  no mouth pain, tongue or tooth problems  Ears  no hearing loss, ear pain, fullness, no swallowing problems  Cardiac  no CP, PND, orthopnea  Chest  no breast masses  Resp  no wheezing, chronic coughing, dyspnea  GI  no heartburn, nausea, vomiting, change in bowel habits, bleeding, hemorrhoids  Urinary  no dysuria, hematuria, flank pain, urgency, frequency  Genitals  no genital lesions, discharge, masses, ulceration, warts  Constitutional  no wt loss, night sweats, unexplained fevers    Visit Vitals    /80    Pulse 92    Temp 98.1 °F (36.7 °C) (Oral)    Ht 5' 5\" (1.651 m)    Wt 195 lb (88.5 kg)    SpO2 99%    BMI 32.45 kg/m2   Affect is appropriate.   Mood stable  No apparent distress  HEENT --Anicteric sclerae, tympanic membranes normal,  ear canals normal.  PERRL, EOMI, conjunctiva and lids normal.  Disks were sharp  Sinuses were nontender, turbinates normal, hearing normal.  Oropharynx without  erythema, normal tongue, oral mucosa and tonsils. No thyromegaly, JVD, or bruits. Lungs --Clear to auscultation, normal percussion. Heart --Regular rate and rhythm, no murmurs, rubs, gallops, or clicks. Breasts -- no masses, skin dimpling, asymmetry, nipple discharge, nodules, axillary adenopathy   Chest wall --Nontender to palpation. PMI normal.  Abdomen -- Soft and nontender, no hepatosplenomegaly or masses. Pelvis -- normal external genitalia, normal vagina, cervix, no adnexal masses or tenderness  Rectal  -- normal tone, guiaiac negative brown stool  Extremities -- Without cyanosis, clubbing, edema. 2+ pulses equally and bilaterally. Normal looking digits, ROM intact  Neuro -- CN 2-12 intact, strength 5/5 with intact soft touch in all extremities, cerebellar  exam WNL, downgoing toes, 2+DTRs  Derm  no obvious abnormalities noted, no rash    LABS  From 3/12 showed   gluc 120, cr 0.72, gfr 97,                                                                                   wbc 6.3, hb 13.0, plt 230, tsh 0.69, lyme neg, ua neg  From 11/12 showed gluc 105, cr 1.04, gfr 62,     hba1c 6.0,              chol 246, tg 306, hdl 61, ldl-c 124,                 tsh 1.21  From 5/13 showed                            chol 302.  tg 281, hdl 60, ldl-c 186  From 11/13 showed gluc 105, cr 0.95, gfr 69,    hba1c 6.1, ldl-p 903, chol 247, tg 134, hdl 74, ldl-c 146  From 11/13 showed gluc 80,   cr 0.92, gfr 72,  alt 15,               chol 146, tg 85,   hdl 78, ldl-c 51,   wbc 5.9, hb 13.9, plt 238  From 11/14 showed gluc 96,   cr 0.92, gfr>60, alt<5,  hba1c 6.1,   chol 233, tg 174, hdl 75, ldl-c 123, wbc 5.7, hb 13.3, plt 204,           ua neg  From 2/15 showed   gluc 94,   cr 0.98, gfr 59,  alt 7,   hba1c 5.8,              wbc 7.6, hb 13.7, plt 228, tsh 0.62, ua neg  From 6/15 showed        hba1c 5.8  From 12/15 showed gluc 96,   cr 0.94, gfr>60, alt 31,    chol 274, tg 209, hdl 91, ldl-c 141  From 6/16 showed   gluc 102, cr 0.89, gfr>60, alt 21, hba1c 6.1,   chol 319, tg 314, hdl 67, ldl-c 189, wbc 6.3, hb 12.5, plt 188, tsh 1.49, ft4 1.1,     tt3 86, tpo ab neg  From 7/16 showed   gluc 102, cr 1.44, gfr 38,              24h U petar 18  From 10/16 showed gluc 103, cr 1.05, gfr 54,  alt 23, hba1c 5.4,   chol 292, tg 292, hdl 67, ldl-c 167    Patient Active Problem List   Diagnosis Code    Hyperlipidemia E78.5    Prediabetes R73.03    Gastroesophageal reflux disease without esophagitis K21.9    DDD (degenerative disc disease), lumbar M51.36    S/P ablation operation for arrhythmia Z98.890, Z86.79    Obesity (BMI 30-39. 9) E66.9    De Quervain's tenosynovitis, left M65.4    Varicose veins of both lower extremities I83.93    Pacemaker Z95.0    Seasonal allergic rhinitis J30.1     Assessment and plan:  1. Ortho. Will send to Dr Glynn Lee  2. Obesity. Declined med supervised wt loss. 3. GERD. PPI tx and avoidance measures prn  4. Osteopenia. Ca/d, weight bearing exercise as tolerated  5. Hyperlipidemia. Lifestyle and dietary measures  6. H/O thyroid nodule. Stable nodule on f/u.   7. Prediabetes. Continue current approaches and attempts at wt loss  8. Probable CVI. Elevation, f/u Dr Choi Pi prn  9. S/P ablation and pacemaker placement. F/U Dr Robby Abdul  10. Allergies. Trial of singulair  11. Tdap given  12. Mammo ordered  13. Declined colo until 2021      RTC 3/18    Above conditions discussed at length and patient vocalized understanding.   All questions answered to patient satifaction

## 2017-03-23 ENCOUNTER — OFFICE VISIT (OUTPATIENT)
Dept: INTERNAL MEDICINE CLINIC | Age: 57
End: 2017-03-23

## 2017-03-23 ENCOUNTER — HOSPITAL ENCOUNTER (OUTPATIENT)
Dept: LAB | Age: 57
Discharge: HOME | End: 2017-03-23
Payer: COMMERCIAL

## 2017-03-23 VITALS
BODY MASS INDEX: 32.49 KG/M2 | HEART RATE: 92 BPM | DIASTOLIC BLOOD PRESSURE: 80 MMHG | OXYGEN SATURATION: 99 % | SYSTOLIC BLOOD PRESSURE: 108 MMHG | HEIGHT: 65 IN | WEIGHT: 195 LBS | TEMPERATURE: 98.1 F

## 2017-03-23 DIAGNOSIS — Z98.890 S/P ABLATION OPERATION FOR ARRHYTHMIA: ICD-10-CM

## 2017-03-23 DIAGNOSIS — M79.641 PAIN IN BOTH HANDS: ICD-10-CM

## 2017-03-23 DIAGNOSIS — Z95.0 PACEMAKER: ICD-10-CM

## 2017-03-23 DIAGNOSIS — J30.1 SEASONAL ALLERGIC RHINITIS DUE TO POLLEN: ICD-10-CM

## 2017-03-23 DIAGNOSIS — Z00.00 PHYSICAL EXAM: ICD-10-CM

## 2017-03-23 DIAGNOSIS — Z12.39 SCREENING BREAST EXAMINATION: ICD-10-CM

## 2017-03-23 DIAGNOSIS — I83.93 VARICOSE VEINS OF BOTH LOWER EXTREMITIES: ICD-10-CM

## 2017-03-23 DIAGNOSIS — K21.9 GASTROESOPHAGEAL REFLUX DISEASE WITHOUT ESOPHAGITIS: ICD-10-CM

## 2017-03-23 DIAGNOSIS — Z01.419 NORMAL PELVIC EXAM: ICD-10-CM

## 2017-03-23 DIAGNOSIS — Z12.31 SCREENING MAMMOGRAM, ENCOUNTER FOR: ICD-10-CM

## 2017-03-23 DIAGNOSIS — R73.03 PREDIABETES: ICD-10-CM

## 2017-03-23 DIAGNOSIS — E78.5 HYPERLIPIDEMIA, UNSPECIFIED HYPERLIPIDEMIA TYPE: ICD-10-CM

## 2017-03-23 DIAGNOSIS — Z23 ENCOUNTER FOR IMMUNIZATION: Primary | ICD-10-CM

## 2017-03-23 DIAGNOSIS — E66.9 OBESITY (BMI 30-39.9): ICD-10-CM

## 2017-03-23 DIAGNOSIS — Z86.79 S/P ABLATION OPERATION FOR ARRHYTHMIA: ICD-10-CM

## 2017-03-23 DIAGNOSIS — M79.642 PAIN IN BOTH HANDS: ICD-10-CM

## 2017-03-23 PROCEDURE — 87624 HPV HI-RISK TYP POOLED RSLT: CPT | Performed by: INTERNAL MEDICINE

## 2017-03-23 PROCEDURE — 88175 CYTOPATH C/V AUTO FLUID REDO: CPT | Performed by: INTERNAL MEDICINE

## 2017-03-23 RX ORDER — MONTELUKAST SODIUM 10 MG/1
10 TABLET ORAL DAILY
Qty: 30 TAB | Refills: 11 | Status: SHIPPED | OUTPATIENT
Start: 2017-03-23 | End: 2017-05-02

## 2017-03-23 NOTE — PROGRESS NOTES
1. Have you been to the ER, urgent care clinic or hospitalized since your last visit? NO.     2. Have you seen or consulted any other health care providers outside of the [address] [place] [address] since your last visit (Include any pap smears or colon screening)? NO      Do you have an Advanced Directive? NO    Would you like information on Advanced Directives?  YES

## 2017-03-23 NOTE — MR AVS SNAPSHOT
Visit Information Date & Time Provider Department Dept. Phone Encounter #  
 3/23/2017 10:30 AM Blane Suazo MD Internist of [address] [place] [address] Place [id number] Your Appointments 4/6/2017  9:20 AM  
POST HOSPITAL with Leandro Fuentes MD  
[facility] ([facility] Appt Note: 6 month follow up with EKG; . .. [place] [address] [address] [address] [phone] [address] [facility] [address] 6/14/2017  3:40 PM  
CARELINK with Pacer Champ Csi [facility] ([facility] Appt Note: [address] [address] [address] [address]  
[phone] [address] [facility] [address] [address]  
  
    
 3/22/2018  8:35 AM  
LAB with [facility] NURSE VISIT Internist of [facility] ([facility] Appt Note: lab  
 [address], [address] [address] [address] [place] [address]  
  
   
 [address], [address] [address]  
  
    
 3/29/2018 10:30 AM  
PHYSICAL with Blane Suazo MD  
Internist of [facility] Appt Note: [address]  
 [address] [facility] [address] [address]  
  
   
 [address], [address] Upcoming Health Maintenance Date Due DTaP/Tdap/Td series (1 - Tdap) 10/6/2010 FOBT Q 1 YEAR AGE 50-75 12/11/2010 PAP AKA CERVICAL CYTOLOGY 11/21/2015 INFLUENZA AGE 9 TO ADULT 8/1/2016 BREAST CANCER SCRN MAMMOGRAM 4/9/2017 Allergies as of 3/23/2017  Review Complete On: 3/23/2017 By: Blane Suazo MD  
  
 Severity Noted Reaction Type Reactions Adhesive Tape-silicones  01/05/9157    Contact Dermatitis Bee Sting [Sting, Bee]    Anaphylaxis Benzoyl Peroxide    Rash Betadine [Povidone-iodine]  02/08/2016    Rash Chlorhexidine Towelette  11/11/2016    Rash Codeine    Nausea and Vomiting Contrave [Naltrexone-bupropion]  06/19/2015    Other (comments) Vision changes Current Immunizations  Reviewed on 8/13/2012 Name Date  
 TD Vaccine 10/5/2010 Zoster Vaccine, Live 11/27/2013 Not reviewed this visit Vitals BP Pulse Temp Height(growth percentile) Weight(growth percentile) SpO2  
 108/80 92 98.1 °F (36.7 °C) (Oral) 5' 5\" (1.651 m) 195 lb (88.5 kg) 99% BMI OB Status Smoking Status 32.45 kg/m2 Postmenopausal Former Smoker Vitals History BMI and BSA Data Body Mass Index Body Surface Area  
 32.45 kg/m 2 2.01 m 2 Preferred Pharmacy Pharmacy Name Phone [facility] [address] [facility] [phone] Your Updated Medication List  
  
   
This list is accurate as of: 3/23/17 11:28 AM.  Always use your most recent med list.  
  
  
  
  
 calcium-cholecalciferol (D3) tablet Commonly known as:  CALTRATE 600+D Take 1 Tab by mouth daily. CENTRUM SILVER WOMEN PO Take 1 Tab by mouth daily. EPINEPHrine 0.3 mg/0.3 mL injection Commonly known as:  EPIPEN  
0.3 mL by IntraMUSCular route once as needed for up to 1 dose. fish oil-dha-epa 1,200-144-216 mg Cap Take 1 Cap by mouth daily. MIRAPEX 1 mg tablet Generic drug:  pramipexole Take 2 mg by mouth nightly. traMADol 50 mg tablet Commonly known as:  ULTRAM  
Take 1 Tab by mouth every six (6) hours as needed for Pain. Introducing [facility] & HEALTH SERVICES! Dear Vee Every: Thank you for requesting a AlleyWatch account. Our records indicate that you already have an active AlleyWatch account. You can access your account anytime at [email or web address]. [email or web address]/Funky Moves Did you know that you can access your hospital and ER discharge instructions at any time in AlleyWatch? You can also review all of your test results from your hospital stay or ER visit. Additional Information If you have questions, please visit the Frequently Asked Questions section of the Private Outlethart website at [email or web address] Remember, Zogenix is NOT to be used for urgent needs. For medical emergencies, dial 911. Now available from your iPhone and Android! Please provide this summary of care documentation to your next provider. Your primary care clinician is listed as Jackie Vela. If you have any questions after today's visit, please call [phone].

## 2017-03-30 ENCOUNTER — TELEPHONE (OUTPATIENT)
Dept: INTERNAL MEDICINE CLINIC | Age: 57
End: 2017-03-30

## 2017-03-30 NOTE — TELEPHONE ENCOUNTER
[address] to notify of appt scheduled with hand specialists, Dr. Pearl Duke with maribel on 4/20/17 at 10:00am, check in at 9:30am.  Their address is [address]. Their number is [phone].

## 2017-04-03 ENCOUNTER — HOSPITAL ENCOUNTER (OUTPATIENT)
Dept: MAMMOGRAPHY | Age: 57
Discharge: HOME | End: 2017-04-03
Attending: INTERNAL MEDICINE
Payer: COMMERCIAL

## 2017-04-03 ENCOUNTER — PATIENT OUTREACH (OUTPATIENT)
Dept: INTERNAL MEDICINE CLINIC | Age: 57
End: 2017-04-03

## 2017-04-03 DIAGNOSIS — Z12.31 SCREENING MAMMOGRAM, ENCOUNTER FOR: ICD-10-CM

## 2017-04-03 PROCEDURE — 77063 BREAST TOMOSYNTHESIS BI: CPT

## 2017-04-03 NOTE — PROGRESS NOTES
Episode closed: no hospitalization or ED admission post 30 days from discharge of 3/1/2017. Goals Addressed             Most Recent     COMPLETED: Attends follow-up appointments as directed. On track (4/3/2017)             Patient attended MIKAYLA THOMPSON appointment on 3/6/2017.  Knowledge and adherence of prescribed medication (ie. action, side effects, missed dose, etc.). On track (4/3/2017)     Understands red flags post discharge.    On track (4/3/2017)

## 2017-04-06 ENCOUNTER — OFFICE VISIT (OUTPATIENT)
Dept: CARDIOLOGY CLINIC | Age: 57
End: 2017-04-06

## 2017-04-06 VITALS
WEIGHT: 192 LBS | OXYGEN SATURATION: 98 % | SYSTOLIC BLOOD PRESSURE: 134 MMHG | DIASTOLIC BLOOD PRESSURE: 94 MMHG | BODY MASS INDEX: 31.99 KG/M2 | HEIGHT: 65 IN | HEART RATE: 97 BPM

## 2017-04-06 DIAGNOSIS — Z98.890 S/P ABLATION OPERATION FOR ARRHYTHMIA: ICD-10-CM

## 2017-04-06 DIAGNOSIS — E78.5 HYPERLIPIDEMIA, UNSPECIFIED HYPERLIPIDEMIA TYPE: ICD-10-CM

## 2017-04-06 DIAGNOSIS — Z95.0 PACEMAKER: Primary | ICD-10-CM

## 2017-04-06 DIAGNOSIS — R00.1 BRADYCARDIA: ICD-10-CM

## 2017-04-06 DIAGNOSIS — I49.3 PVC'S (PREMATURE VENTRICULAR CONTRACTIONS): ICD-10-CM

## 2017-04-06 DIAGNOSIS — Z86.79 S/P ABLATION OPERATION FOR ARRHYTHMIA: ICD-10-CM

## 2017-04-06 NOTE — MR AVS SNAPSHOT
Visit Information Date & Time Provider Department Dept. Phone Encounter #  
 4/6/2017  9:20 AM Ghazal Gomez MD [facility] [id number] Follow-up Instructions Follow-up and Disposition History Your Appointments 6/14/2017  3:40 PM  
CARELINK with Pacer Hv Csi [facility] (Author Tamara) Appt Note: [address] [place] [id number] [phone] [address] [place] [address] [address] [address]  
  
    
 3/22/2018  8:35 AM  
LAB with [facility] NURSE VISIT Internist of [facility] (Author Tamara) Appt Note: lab  
 [address], [address] [place] [address] [place]  
  
   
 [address], [address] [address]  
  
    
 3/29/2018 10:30 AM  
PHYSICAL with Keisha Paez MD  
Internist of [facility] Author Tamara) Appt Note: [address]  
 [address] [facility] [place]ahn[place] [address] [place]  
  
   
 [address], [address] Upcoming Health Maintenance Date Due  
 BREAST CANCER SCRN MAMMOGRAM 4/3/2019 PAP AKA CERVICAL CYTOLOGY 3/23/2020 COLONOSCOPY 1/14/2021 DTaP/Tdap/Td series (2 - Td) 3/23/2027 Allergies as of 4/6/2017  Review Complete On: 4/6/2017 By: Ghazal Gomez MD  
  
 Severity Noted Reaction Type Reactions Adhesive Tape-silicones  68/97/1801    Contact Dermatitis Bee Sting [Sting, Bee]    Anaphylaxis Benzoyl Peroxide    Rash Betadine [Povidone-iodine]  02/08/2016    Rash Chlorhexidine Towelette  11/11/2016    Rash Codeine    Nausea and Vomiting Contrave [Naltrexone-bupropion]  06/19/2015    Other (comments) Vision changes Current Immunizations  Reviewed on 8/13/2012 Name Date  
 TD Vaccine 10/5/2010 Tdap 3/23/2017 Zoster Vaccine, Live 11/27/2013 Not reviewed this visit You Were Diagnosed With   
  
 Codes Comments Pacemaker    -  Primary ICD-10-CM: Z95.0 ICD-9-CM: V45.01 Hyperlipidemia, unspecified hyperlipidemia type     ICD-10-CM: E78.5 ICD-9-CM: 272.4 S/P ablation operation for arrhythmia     ICD-10-CM: Z98.890, Z86.79 
ICD-9-CM: V45.89 PVC's (premature ventricular contractions)     ICD-10-CM: I49.3 ICD-9-CM: 427.69 Bradycardia     ICD-10-CM: R00.1 ICD-9-CM: 427.89 Vitals BP Pulse Height(growth percentile) Weight(growth percentile) SpO2 BMI  
 (!) 134/94 97 5' 5\" (1.651 m) 192 lb (87.1 kg) 98% 31.95 kg/m2 OB Status Smoking Status Postmenopausal Former Smoker Vitals History BMI and BSA Data Body Mass Index Body Surface Area 31.95 kg/m 2 2 m 2 Preferred Pharmacy Pharmacy Name Phone [address] [place] [phone] Your Updated Medication List  
  
   
This list is accurate as of: 4/6/17  9:46 AM.  Always use your most recent med list.  
  
  
  
  
 calcium-cholecalciferol (D3) tablet Commonly known as:  CALTRATE 600+D Take 1 Tab by mouth daily. CENTRUM SILVER WOMEN PO Take 1 Tab by mouth daily. EPINEPHrine 0.3 mg/0.3 mL injection Commonly known as:  EPIPEN  
0.3 mL by IntraMUSCular route once as needed for up to 1 dose. fish oil-dha-epa 1,200-144-216 mg Cap Take 1 Cap by mouth daily. MIRAPEX 1 mg tablet Generic drug:  pramipexole Take 2 mg by mouth nightly. montelukast 10 mg tablet Commonly known as:  SINGULAIR Take 1 Tab by mouth daily. traMADol 50 mg tablet Commonly known as:  ULTRAM  
Take 1 Tab by mouth every six (6) hours as needed for Pain. We Performed the Following AMB POC EKG ROUTINE W/ 12 LEADS, INTER & REP [90372 CPT(R)] Introducing [facility] & HEALTH SERVICES! Dear Joaquin Barahona: Thank you for requesting a SocialComhart account.   Our records indicate that you already have an active [email or web address] account. You can access your account anytime at [email or web address] If You [email or web address] Did you know that you can access your hospital and ER discharge instructions at any time in [email or web address]? You can also review all of your test results from your hospital stay or ER visit. Additional Information If you have questions, please visit the Frequently Asked Questions section of the [email or web address] website at [email or web address] Remember, [email or web address] is NOT to be used for urgent needs. For medical emergencies, dial 911. Now available from your iPhone and Android! Please provide this summary of care documentation to your next provider. Your primary care clinician is listed as Jaycee Gamez. If you have any questions after today's visit, please call [phone].

## 2017-04-06 NOTE — PROGRESS NOTES
1. Have you been to the ER, urgent care clinic since your last visit? Hospitalized since your last visit? Yes, 2/28/17 for loop implant    2. Have you seen or consulted any other health care providers outside of the [address] [place] [address] since your last visit? Include any pap smears or colon screening.   No

## 2017-04-06 NOTE — PROGRESS NOTES
History of Present Illness:  A 64 y.o. female here for follow up. She underwent dual chamber Medtronic MRI compatible pacemaker February 2017 for syncope and documented bradycardia by implantable loop recorder. She has been doing well. She denies any new chest pain, dyspnea, PND, orthopnea or edema. Impression:   1. Dual chamber Medtronic pacemaker February 2017 due to syncope and pauses by event monitor. 2. 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 3. Chronic pain and tension headaches. 4. History of PVCs with attempted ablation February 2016.   5. History of exercise-induced asthma. Her pacemaker is functioning normally. Her incision is well healed. Her blood pressure is well controlled. There is no evidence of any arrhythmias or atrial activity on the pacemaker. She can resume regular activities. I will see her back in one year and continue with home checks on a quarterly basis. All questions answered. Past Medical History:   Diagnosis Date    Arrhythmia     PVC 10% burden; s/p partial ablation Dr Tamanna Celestin 2/16    Asthma     h/o exercise induced    Cardiac Agatston CAC score, <100 02/29/2016    Coronary calcium score 0.    Cardiac echocardiogram 01/19/2016    EF 55-60%. No RWMA. Indeterminate diastolic fx. RVSP 26 mmHg. Mild MR.  Cardiac Holter monitor study 12/21/2015    Sinus rhythm, avg HR 82 bpm (range  bpm). Numerous PVCs. A few short runs of VT.  Cardiovascular LE venous duplex 07/08/2016    No DVT bilaterally.     Cholelithiasis 2008    on US    Chronic back pain     Dr. Pawan Lee    Chronic venous insufficiency 2016    Dr Le Wiley tenosynovitis     Dr Stephen Hernandez Dupuytren's contracture of both hands     Dr Stephen Hernandez FHx: heart disease     GERD (gastroesophageal reflux disease) 2/99    on UGI    History of palpitations 2005    Hyperlipidemia     calculated 10 year risk score was 1.8% (12/15); 2.2% (10/16)    Hypovitaminosis D     Myofacial pain     neck and thoracic    OA (osteoarthritis)     lumbar DDD    Obesity     peak weight 200 lbs, bmi 33.3 from 5/13; qsymia ineffective, intol contrave, delcined med supervised wt loss; 24h U petar nl    Osteopenia 2007    t score -1 spine, -0.2 hip Dr. Ulysses Baize Pericardial cyst 12/13    calcified on CT    Prediabetes 11/12    Tachycardia     neg eval Dr. Kameron Bonner 2004    Tension headache     Thyroid nodule 8/12    3mm; 5/13, 12/13, 11/14, 1/16 no change    Trigger finger of both hands     Dr Jennifer Hinkle       Current Outpatient Prescriptions   Medication Sig Dispense Refill    traMADol (ULTRAM) 50 mg tablet Take 1 Tab by mouth every six (6) hours as needed for Pain. 30 Tab 0    pramipexole (MIRAPEX) 1 mg tablet Take 2 mg by mouth nightly.  calcium-cholecalciferol, D3, (CALTRATE 600+D) tablet Take 1 Tab by mouth daily.  fish oil-dha-epa 1,200-144-216 mg cap Take 1 Cap by mouth daily.  MULTIVITS-MIN/IRON/FA/LUTEIN (CENTRUM SILVER WOMEN PO) Take 1 Tab by mouth daily.  EPINEPHrine (EPIPEN) 0.3 mg/0.3 mL (1:1,000) injection 0.3 mL by IntraMUSCular route once as needed for up to 1 dose. 0.3 mL 1    montelukast (SINGULAIR) 10 mg tablet Take 1 Tab by mouth daily. 30 Tab 11       Social History   reports that she quit smoking about 24 years ago. She has a 20.00 pack-year smoking history. She has never used smokeless tobacco.   reports that she drinks alcohol. Family History  family history includes Alcohol abuse in her brother; Rosa Montgomery in her brother, father, mother, and sister; Cancer in her mother; Diabetes in her father and mother; Elevated Lipids in her father and mother; Heart Disease in her father; Hypertension in her mother; Migraines in her sister; Stroke in her mother. Review of Systems  Except as stated above include:  Constitutional: Negative for fever, chills and malaise/fatigue.    HEENT: No congestion or recent URI.  Gastrointestinal: No nausea, vomiting, abdominal pain, bloody stools. Pulmonary:  Negative except as stated above. Cardiac:  Negative except as stated above. Musculoskeletal: Negative except as stated above. Neurological:  No localized symptoms. Skin:  Negative except as stated above. Psych:  No mood swings. Endocrine:  No heat/cold intolerance. PHYSICAL EXAM  BP Readings from Last 3 Encounters:   04/06/17 (!) 134/94   03/23/17 108/80   03/06/17 120/82     Pulse Readings from Last 3 Encounters:   04/06/17 97   03/23/17 92   03/06/17 88     Wt Readings from Last 3 Encounters:   04/06/17 87.1 kg (192 lb)   03/23/17 88.5 kg (195 lb)   03/06/17 88.5 kg (195 lb)     General:   Well developed, well groomed. Head/Neck:   No jugular venous distention     No carotid bruits. No evidence of xanthelasma. Lungs:   No respiratory distress. Clear bilaterally. Heart:    Regular rate and rhythm. Normal S1/S2. Palpation of heart with normal point of maximum impulse. No significant murmurs, rubs or gallops. Left pacemaker pocket intact. Abdomen:   Soft and nontender. No palpable abdominal mass or bruits. Extremities:   Intact peripheral pulses. No significant edema. Neurological:   Alert and oriented to person, place, time. No focal neurological deficit visually.

## 2017-04-11 ENCOUNTER — OFFICE VISIT (OUTPATIENT)
Dept: INTERNAL MEDICINE CLINIC | Age: 57
End: 2017-04-11

## 2017-04-11 ENCOUNTER — HOSPITAL ENCOUNTER (OUTPATIENT)
Dept: LAB | Age: 57
Discharge: HOME | End: 2017-04-11
Payer: COMMERCIAL

## 2017-04-11 VITALS
SYSTOLIC BLOOD PRESSURE: 118 MMHG | BODY MASS INDEX: 31.65 KG/M2 | OXYGEN SATURATION: 99 % | HEIGHT: 65 IN | WEIGHT: 190 LBS | TEMPERATURE: 98.8 F | HEART RATE: 88 BPM | DIASTOLIC BLOOD PRESSURE: 78 MMHG

## 2017-04-11 DIAGNOSIS — R10.10 PAIN OF UPPER ABDOMEN: Primary | ICD-10-CM

## 2017-04-11 LAB
ALBUMIN SERPL BCP-MCNC: 3.7 G/DL (ref 3.4–5)
ALBUMIN/GLOB SERPL: 0.9 {RATIO} (ref 0.8–1.7)
ALP SERPL-CCNC: 89 U/L (ref 45–117)
ALT SERPL-CCNC: 35 U/L (ref 13–56)
ANION GAP BLD CALC-SCNC: 10 MMOL/L (ref 3–18)
AST SERPL W P-5'-P-CCNC: 41 U/L (ref 15–37)
BASOPHILS # BLD AUTO: 0 K/UL (ref 0–0.06)
BASOPHILS # BLD: 0 % (ref 0–2)
BILIRUB SERPL-MCNC: 0.5 MG/DL (ref 0.2–1)
BUN SERPL-MCNC: 18 MG/DL (ref 7–18)
BUN/CREAT SERPL: 17 (ref 12–20)
CALCIUM SERPL-MCNC: 9.4 MG/DL (ref 8.5–10.1)
CHLORIDE SERPL-SCNC: 101 MMOL/L (ref 100–108)
CO2 SERPL-SCNC: 28 MMOL/L (ref 21–32)
CREAT SERPL-MCNC: 1.05 MG/DL (ref 0.6–1.3)
DIFFERENTIAL METHOD BLD: NORMAL
EOSINOPHIL # BLD: 0.1 K/UL (ref 0–0.4)
EOSINOPHIL NFR BLD: 1 % (ref 0–5)
ERYTHROCYTE [DISTWIDTH] IN BLOOD BY AUTOMATED COUNT: 13.5 % (ref 11.6–14.5)
GLOBULIN SER CALC-MCNC: 4 G/DL (ref 2–4)
GLUCOSE SERPL-MCNC: 113 MG/DL (ref 74–99)
HCT VFR BLD AUTO: 38.6 % (ref 35–45)
HGB BLD-MCNC: 12.9 G/DL (ref 12–16)
LIPASE SERPL-CCNC: 132 U/L (ref 73–393)
LYMPHOCYTES # BLD AUTO: 29 % (ref 21–52)
LYMPHOCYTES # BLD: 2.7 K/UL (ref 0.9–3.6)
MCH RBC QN AUTO: 30.3 PG (ref 24–34)
MCHC RBC AUTO-ENTMCNC: 33.4 G/DL (ref 31–37)
MCV RBC AUTO: 90.6 FL (ref 74–97)
MONOCYTES # BLD: 1 K/UL (ref 0.05–1.2)
MONOCYTES NFR BLD AUTO: 10 % (ref 3–10)
NEUTS SEG # BLD: 5.7 K/UL (ref 1.8–8)
NEUTS SEG NFR BLD AUTO: 60 % (ref 40–73)
PLATELET # BLD AUTO: 227 K/UL (ref 135–420)
PMV BLD AUTO: 10.7 FL (ref 9.2–11.8)
POTASSIUM SERPL-SCNC: 3.6 MMOL/L (ref 3.5–5.5)
PROT SERPL-MCNC: 7.7 G/DL (ref 6.4–8.2)
RBC # BLD AUTO: 4.26 M/UL (ref 4.2–5.3)
SODIUM SERPL-SCNC: 139 MMOL/L (ref 136–145)
WBC # BLD AUTO: 9.4 K/UL (ref 4.6–13.2)

## 2017-04-11 PROCEDURE — 80053 COMPREHEN METABOLIC PANEL: CPT | Performed by: INTERNAL MEDICINE

## 2017-04-11 PROCEDURE — 36415 COLL VENOUS BLD VENIPUNCTURE: CPT | Performed by: INTERNAL MEDICINE

## 2017-04-11 PROCEDURE — 83690 ASSAY OF LIPASE: CPT | Performed by: INTERNAL MEDICINE

## 2017-04-11 PROCEDURE — 85025 COMPLETE CBC W/AUTO DIFF WBC: CPT | Performed by: INTERNAL MEDICINE

## 2017-04-11 NOTE — PROGRESS NOTES
64 y.o. WHITE OR  female who presents for evaluation. She is here complaining 4 day history of epigastric pain. It started this past weekend, she cannot recall exactly what triggered it. It was initially in the mid epigastric region, started radiating to the back later that day. No actual nausea or vomiting, not really worse with eating, no change in bowel habits and no overt bleeding. She has not had fevers or other systemic complaints. No associated urinary or GYN issues. In the last couple of days, it is more localized on the right upper quadrant. She is known to have gallstones on ultrasound from 2008 but as best she can tell, she has only had maybe 2 or 3 episodes that could be related to this condition in the intervening years. She has not been using any anti-inflammatories, this does not feel like her reflux, she does not drink. She tried some antacid, no help, Tylenol seemed to help a little bit with the pain at least    Past Medical History:   Diagnosis Date    Arrhythmia     PVC 10% burden; s/p partial ablation Dr Jamal Berry 2/16    Asthma     h/o exercise induced    Cardiac Agatston CAC score, <100 02/29/2016    Coronary calcium score 0.    Cardiac echocardiogram 01/19/2016    EF 55-60%. No RWMA. Indeterminate diastolic fx. RVSP 26 mmHg. Mild MR.  Cardiac Holter monitor study 12/21/2015    Sinus rhythm, avg HR 82 bpm (range  bpm). Numerous PVCs. A few short runs of VT.  Cardiovascular LE venous duplex 07/08/2016    No DVT bilaterally.     Cholelithiasis 2008    on US    Chronic back pain     Dr. Cori Flowers    Chronic venous insufficiency 2016    Dr Bjorn Lynch tenosynovitis     Dr Josias Duffy Dupuytren's contracture of both hands     Dr Josias Duffy FHx: heart disease     GERD (gastroesophageal reflux disease) 2/99    on UGI    History of palpitations 2005    Hyperlipidemia     calculated 10 year risk score was 1.8% (12/15); 2.2% (10/16)    Hypovitaminosis D     Myofacial pain     neck and thoracic    OA (osteoarthritis)     lumbar DDD    Obesity     peak weight 200 lbs, bmi 33.3 from 5/13; qsymia ineffective, intol contrave, delcined med supervised wt loss; 24h U petar nl    Osteopenia 2007    t score -1 spine, -0.2 hip Dr. Luis Pagan Pericardial cyst 12/13    calcified on CT    Prediabetes 11/12    Tachycardia     neg eval Dr. Radhika Slaughter 2004    Tension headache     Thyroid nodule 8/12    3mm; 5/13, 12/13, 11/14, 1/16 no change    Trigger finger of both hands     Dr Shaheen Cordova     Past Surgical History:   Procedure Laterality Date    CARDIAC SURG PROCEDURE UNLIST  2004    Holter negative    CARDIAC SURG PROCEDURE UNLIST  2004    echo nl lv, ef 65%    HAND/FINGER SURGERY UNLISTED      HX BUNIONECTOMY  2005    bilateral    HX CARPAL TUNNEL RELEASE  12/08    HX COLONOSCOPY      HBV 2011 negative    HX CYST REMOVAL  2007    right lumbar     HX ORTHOPAEDIC      trigger finger release Dr Gamble Ip    [address]      heart ablation   [address]  3/12    venous doppler negative     Social History     Social History    Marital status:      Spouse name: N/A    Number of children: 1    Years of education: N/A     Occupational History    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      Social History Main Topics    Smoking status: Former Smoker     Packs/day: 1.00     Years: 20.00     Quit date: 1/14/1993    Smokeless tobacco: Never Used      Comment: 1994    Alcohol use 0.0 oz/week     0 Standard drinks or equivalent per week      Comment: rarely    Drug use: No    Sexual activity: Not on file     Other Topics Concern    Not on file     Social History Narrative     Current Outpatient Prescriptions   Medication Sig    montelukast (SINGULAIR) 10 mg tablet Take 1 Tab by mouth daily.  traMADol (ULTRAM) 50 mg tablet Take 1 Tab by mouth every six (6) hours as needed for Pain.     pramipexole (MIRAPEX) 1 mg tablet Take 2 mg by mouth nightly.  calcium-cholecalciferol, D3, (CALTRATE 600+D) tablet Take 1 Tab by mouth daily.  fish oil-dha-epa 1,200-144-216 mg cap Take 1 Cap by mouth daily.  MULTIVITS-MIN/IRON/FA/LUTEIN (CENTRUM SILVER WOMEN PO) Take 1 Tab by mouth daily.  EPINEPHrine (EPIPEN) 0.3 mg/0.3 mL (1:1,000) injection 0.3 mL by IntraMUSCular route once as needed for up to 1 dose. No current facility-administered medications for this visit. REVIEW OF SYSTEMS:   Ophtho - no vision change or eye pain  Oral - no mouth pain, tongue or tooth problems  Ears - no hearing loss, ear pain, fullness, no swallowing problems  Cardiac - no CP, PND, orthopnea, edema, palpitations or syncope  Chest - no breast masses  Resp - no wheezing, chronic coughing, dyspnea  Urinary - no dysuria, hematuria, flank pain, urgency, frequency  Genitals - no genital lesions, discharge, masses, ulceration, warts  Ortho - no swelling, dec ROM, myalgias  Derm - no nail abnormalities, rashes, lesions of note, hair loss    Visit Vitals    /78    Pulse 88    Temp 98.8 °F (37.1 °C) (Oral)    Ht 5' 5\" (1.651 m)    Wt 190 lb (86.2 kg)    SpO2 99%    BMI 31.62 kg/m2   A&O x3  Affect is appropriate. Mood stable  No apparent distress  Anicteric, no JVD, adenopathy or thyromegaly. No carotid bruits or radiated murmur  Lungs clear to auscultation, no wheezes or rales  Heart showed regular rate and rhythm. No murmur, rubs, gallops  Abdomen soft nondistended, no hepatosplenomegaly or masses. Mild/moderate midepigastric and right upper quadrant tenderness without sasha rebound or guarding. Rectal and pelvic were deferred  Extremities without edema. Pulses 1-2+ symmetrically    Assessment and plan:  1. Rule out gallbladder pain, rule out pancreatitis. Long discussion about potential etiologies. We will check labs ASAP, schedule ultrasound also. Warned her she may need to go for surgical evaluation.         Above conditions discussed at length and patient vocalized understanding.   All questions answered to patient satifaction

## 2017-04-14 ENCOUNTER — HOSPITAL ENCOUNTER (OUTPATIENT)
Dept: ULTRASOUND IMAGING | Age: 57
Discharge: HOME | End: 2017-04-14
Attending: INTERNAL MEDICINE
Payer: COMMERCIAL

## 2017-04-14 DIAGNOSIS — R10.10 PAIN OF UPPER ABDOMEN: ICD-10-CM

## 2017-04-14 PROCEDURE — 76705 ECHO EXAM OF ABDOMEN: CPT

## 2017-04-17 ENCOUNTER — TELEPHONE (OUTPATIENT)
Dept: INTERNAL MEDICINE CLINIC | Age: 57
End: 2017-04-17

## 2017-04-17 DIAGNOSIS — R10.11 RUQ PAIN: Primary | ICD-10-CM

## 2017-04-17 NOTE — TELEPHONE ENCOUNTER
Hi!  I saw Dr. Stephanie Anderson last Tuesday. Derejesupriya Butch had some blood drawn on Tuesday and and ultrasound on Friday both at [address] was wondering how everything is looking and if there are any next steps.  (I still have paid/discomfort in my abdominal area.)  Thank you!

## 2017-04-17 NOTE — TELEPHONE ENCOUNTER
pls call    Labs ok    Results for orders placed or performed during the hospital encounter of 04/11/17   CBC WITH AUTOMATED DIFF   Result Value Ref Range    WBC 9.4 4.6 - 13.2 K/uL    RBC 4.26 4.20 - 5.30 M/uL    HGB 12.9 12.0 - 16.0 g/dL    HCT 38.6 35.0 - 45.0 %    MCV 90.6 74.0 - 97.0 FL    MCH 30.3 24.0 - 34.0 PG    MCHC 33.4 31.0 - 37.0 g/dL    RDW 13.5 11.6 - 14.5 %    PLATELET 987 778 - 937 K/uL    MPV 10.7 9.2 - 11.8 FL    NEUTROPHILS 60 40 - 73 %    LYMPHOCYTES 29 21 - 52 %    MONOCYTES 10 3 - 10 %    EOSINOPHILS 1 0 - 5 %    BASOPHILS 0 0 - 2 %    ABS. NEUTROPHILS 5.7 1.8 - 8.0 K/UL    ABS. LYMPHOCYTES 2.7 0.9 - 3.6 K/UL    ABS. MONOCYTES 1.0 0.05 - 1.2 K/UL    ABS. EOSINOPHILS 0.1 0.0 - 0.4 K/UL    ABS. BASOPHILS 0.0 0.0 - 0.06 K/UL    DF AUTOMATED     LIPASE   Result Value Ref Range    Lipase 132 73 - 826 U/L   METABOLIC PANEL, COMPREHENSIVE   Result Value Ref Range    Sodium 139 136 - 145 mmol/L    Potassium 3.6 3.5 - 5.5 mmol/L    Chloride 101 100 - 108 mmol/L    CO2 28 21 - 32 mmol/L    Anion gap 10 3.0 - 18 mmol/L    Glucose 113 (H) 74 - 99 mg/dL    BUN 18 7.0 - 18 MG/DL    Creatinine 1.05 0.6 - 1.3 MG/DL    BUN/Creatinine ratio 17 12 - 20      GFR est AA >60 >60 ml/min/1.73m2    GFR est non-AA 54 (L) >60 ml/min/1.73m2    Calcium 9.4 8.5 - 10.1 MG/DL    Bilirubin, total 0.5 0.2 - 1.0 MG/DL    ALT (SGPT) 35 13 - 56 U/L    AST (SGOT) 41 (H) 15 - 37 U/L    Alk. phosphatase 89 45 - 117 U/L    Protein, total 7.7 6.4 - 8.2 g/dL    Albumin 3.7 3.4 - 5.0 g/dL    Globulin 4.0 2.0 - 4.0 g/dL    A-G Ratio 0.9 0.8 - 1.7       US shows    IMPRESSION:  1. Echogenic area adjacent to portal flow in the liver, hemangioma or focal  fatty infiltration? Not seen on prior study. Dedicated CT liver protocol  recommended. 2. Cholelithiasis with chronic gallbladder wall thickening. No findings of acute  cholecystitis. Normal CBD. Will need 2 studies  1. HIDA to check on gb function to r/o chronic cholecystitis  2. Then dedicated CT to confirm hemangioma    First one was ordered and will decide on when to do 2nd pending results of hida (will need to see surgeon if positive)

## 2017-04-19 ENCOUNTER — TELEPHONE (OUTPATIENT)
Dept: INTERNAL MEDICINE CLINIC | Age: 57
End: 2017-04-19

## 2017-04-19 NOTE — TELEPHONE ENCOUNTER
The HIDA scan has been scheduled but I see that Dr. Danial Stanley has not yet ordered the liver scan.  Is he waiting to order the liver scan until after the HIDA scan is complete?       Also, on 12/13/2013 when I had a CT Chest w/o Contrast scan, they reported 'focal fat seen at the falciform ligament. Liver otherwise unremarkable. \" Svetlana Winter don't know if the falciform ligament and the portal vein where they saw the mass are in the same area or not. Thank you!

## 2017-04-20 NOTE — TELEPHONE ENCOUNTER
We're waiting on the hida   CT if needed after that  Not sure the anatomy and radiologist did not compare the us to the ct from 12/13

## 2017-04-21 ENCOUNTER — HOSPITAL ENCOUNTER (OUTPATIENT)
Dept: NUCLEAR MEDICINE | Age: 57
Discharge: HOME | End: 2017-04-21
Attending: INTERNAL MEDICINE
Payer: COMMERCIAL

## 2017-04-21 VITALS — BODY MASS INDEX: 30.79 KG/M2 | WEIGHT: 185 LBS

## 2017-04-21 DIAGNOSIS — R10.11 RUQ PAIN: ICD-10-CM

## 2017-04-21 PROCEDURE — 78226 HEPATOBILIARY SYSTEM IMAGING: CPT

## 2017-04-25 ENCOUNTER — TELEPHONE (OUTPATIENT)
Dept: INTERNAL MEDICINE CLINIC | Age: 57
End: 2017-04-25

## 2017-04-25 DIAGNOSIS — D18.03 LIVER HEMANGIOMA: Primary | ICD-10-CM

## 2017-04-26 ENCOUNTER — TELEPHONE (OUTPATIENT)
Dept: INTERNAL MEDICINE CLINIC | Age: 57
End: 2017-04-26

## 2017-04-26 NOTE — TELEPHONE ENCOUNTER
pls call hida scan abn c/w chronic cholecystitis  sched w dr Jeri Coello    Will sched CT liver to eval for hemangioma

## 2017-04-26 NOTE — TELEPHONE ENCOUNTER
Dr. Shan Justin referred to Dr. Zach Amos but she has decided she does not want to see him. She wants to know if she get a referral to see Dr. Tommy Stone.

## 2017-04-27 RX ORDER — EPINEPHRINE 0.3 MG/.3ML
INJECTION INTRAMUSCULAR
Qty: 2 SYRINGE | Refills: 5 | Status: SHIPPED | OUTPATIENT
Start: 2017-04-27 | End: 2018-05-29 | Stop reason: SDUPTHER

## 2017-04-28 ENCOUNTER — TELEPHONE (OUTPATIENT)
Dept: CARDIOLOGY CLINIC | Age: 57
End: 2017-04-28

## 2017-04-28 ENCOUNTER — HOSPITAL ENCOUNTER (OUTPATIENT)
Dept: CT IMAGING | Age: 57
Discharge: HOME | End: 2017-04-28
Attending: INTERNAL MEDICINE
Payer: COMMERCIAL

## 2017-04-28 ENCOUNTER — OFFICE VISIT (OUTPATIENT)
Dept: SURGERY | Age: 57
End: 2017-04-28

## 2017-04-28 ENCOUNTER — TELEPHONE (OUTPATIENT)
Dept: SURGERY | Age: 57
End: 2017-04-28

## 2017-04-28 VITALS
HEART RATE: 82 BPM | TEMPERATURE: 97.6 F | RESPIRATION RATE: 16 BRPM | OXYGEN SATURATION: 98 % | HEIGHT: 65 IN | SYSTOLIC BLOOD PRESSURE: 122 MMHG | WEIGHT: 185.8 LBS | DIASTOLIC BLOOD PRESSURE: 78 MMHG | BODY MASS INDEX: 30.96 KG/M2

## 2017-04-28 DIAGNOSIS — D18.03 LIVER HEMANGIOMA: ICD-10-CM

## 2017-04-28 DIAGNOSIS — K81.9 CHOLECYSTITIS: ICD-10-CM

## 2017-04-28 DIAGNOSIS — K80.20 GALLSTONES: Primary | ICD-10-CM

## 2017-04-28 PROCEDURE — 74011636320 HC RX REV CODE- 636/320: Performed by: INTERNAL MEDICINE

## 2017-04-28 PROCEDURE — 74170 CT ABD WO CNTRST FLWD CNTRST: CPT

## 2017-04-28 RX ADMIN — IOPAMIDOL 100 ML: 612 INJECTION, SOLUTION INTRAVENOUS at 18:00

## 2017-04-28 NOTE — TELEPHONE ENCOUNTER
Left message for Dr. Ewelina Ferrer to inquire if Ms. Kay West may proceed to have surgery (robotic single site cholecystectomy with firefly) in view of recent pacemaker implantation. [facility] receptionist states Dr. Ewelina Ferrer is out of the office until Thursday, May 4, 2017 however she will forward an electronic message.

## 2017-04-28 NOTE — PROGRESS NOTES
General Surgery Consult      Emily Esposito  Admit date: (Not on file)    MRN: [id number]     : 1960     Age: 64 y.o. Attending Physician: Cleo Drake MD, FACS      Subjective:     Mey Hollingsworth is a 64 y.o. female with a history of abdominal pain. She complains of right upper quadrant pain, typically associated  with fatty foods. She has had no nausea and no vomiting. The patient  has not had jaundice, acholic stools or dark urine and has not had a history of pancreatitis or hepatitis. HIDA scan showed non-visualization of the gallbladder consistent with cholecystitis. Patient Active Problem List    Diagnosis Date Noted    Seasonal allergic rhinitis 2017    Pacemaker 2017    Varicose veins of both lower extremities 2016    De Quervain's tenosynovitis, left 2016    Obesity (BMI 30-39.9) 2016    S/P ablation operation for arrhythmia 2016    DDD (degenerative disc disease), lumbar 2016    Gastroesophageal reflux disease without esophagitis 2015    Hyperlipidemia 2012    Prediabetes 2012     Past Medical History:   Diagnosis Date    Arrhythmia     PVC 10% burden; s/p partial ablation Dr Amado Sweeney     Asthma     h/o exercise induced    Cardiac Agatston CAC score, <100 2016    Coronary calcium score 0.    Cardiac echocardiogram 2016    EF 55-60%. No RWMA. Indeterminate diastolic fx. RVSP 26 mmHg. Mild MR.  Cardiac Holter monitor study 2015    Sinus rhythm, avg HR 82 bpm (range  bpm). Numerous PVCs. A few short runs of VT.  Cardiovascular LE venous duplex 2016    No DVT bilaterally.     Cholelithiasis     on US    Chronic back pain     Dr. Byron Argueta    Chronic venous insufficiency     Dr Kaitlyn Rome tenosynovitis     Dr Harshal Barahona Dupuytren's contracture of both hands     Dr Harshal Barahona FHx: heart disease     GERD (gastroesophageal reflux disease)     on UGI  History of palpitations 2005    Hyperlipidemia     calculated 10 year risk score was 1.8% (12/15); 2.2% (10/16)    Hypovitaminosis D     Myofacial pain     neck and thoracic    OA (osteoarthritis)     lumbar DDD    Obesity     peak weight 200 lbs, bmi 33.3 from 5/13; qsymia ineffective, intol contrave, delcined med supervised wt loss; 24h U petar nl    Osteopenia 2007    t score -1 spine, -0.2 hip Dr. Jasiel Bolton Pericardial cyst 12/13    calcified on CT    Prediabetes 11/12    Tachycardia     neg eval Dr. Levy Leary 2004    Tension headache     Thyroid nodule 8/12    3mm; 5/13, 12/13, 11/14, 1/16 no change    Trigger finger of both hands     Dr Lenny Pop      Past Surgical History:   Procedure Laterality Date    CARDIAC SURG PROCEDURE UNLIST  2004    Holter negative    CARDIAC SURG PROCEDURE UNLIST  2004    echo nl lv, ef 65%    HAND/FINGER SURGERY UNLISTED      HX BUNIONECTOMY  2005    bilateral    HX CARPAL TUNNEL RELEASE  12/08    HX COLONOSCOPY      HBV 2011 negative    HX CYST REMOVAL  2007    right lumbar     HX ORTHOPAEDIC      trigger finger release Dr Yamileth Gifford    [address] [facility]      heart ablation   [address] UN[address]  3/12    venous doppler negative      Social History   Substance Use Topics    Smoking status: Former Smoker     Packs/day: 1.00     Years: 20.00     Quit date: 1/14/1993    Smokeless tobacco: Never Used      Comment: 1994    Alcohol use 0.0 oz/week     0 Standard drinks or equivalent per week      Comment: rarely      History   Smoking Status    Former Smoker    Packs/day: 1.00    Years: 20.00    Quit date: 1/14/1993   Smokeless Tobacco    Never Used     Comment: 1994     Family History   Problem Relation Age of Onset    Hypertension Mother     Diabetes Mother     Stroke Mother     Arthritis-osteo Mother     Cancer Mother      breast    Elevated Lipids Mother     Diabetes Father     Arthritis-osteo Father     Heart Disease Father     Elevated Lipids Father     Arthritis-osteo Sister     Migraines Sister     Alcohol abuse Brother     Arthritis-osteo Brother       Current Outpatient Prescriptions   Medication Sig    EPIPEN 2-PRITI 0.3 mg/0.3 mL injection INJECT 0.3 ML BY INTRAMUSCULAR ROUTE ONCE AS NEEDED FOR UP TO 1 DOSE.  traMADol (ULTRAM) 50 mg tablet Take 1 Tab by mouth every six (6) hours as needed for Pain.  pramipexole (MIRAPEX) 1 mg tablet Take 2 mg by mouth nightly.  calcium-cholecalciferol, D3, (CALTRATE 600+D) tablet Take 1 Tab by mouth daily.  fish oil-dha-epa 1,200-144-216 mg cap Take 1 Cap by mouth daily.  MULTIVITS-MIN/IRON/FA/LUTEIN (CENTRUM SILVER WOMEN PO) Take 1 Tab by mouth daily.  montelukast (SINGULAIR) 10 mg tablet Take 1 Tab by mouth daily. No current facility-administered medications for this visit.        Allergies   Allergen Reactions    Adhesive Tape-Silicones Contact Dermatitis    Bee Sting [Sting, Bee] Anaphylaxis    Benzoyl Peroxide Rash    Betadine [Povidone-Iodine] Rash    Chlorhexidine Towelette Rash    Codeine Nausea and Vomiting    Contrave [Naltrexone-Bupropion] Other (comments)     Vision changes        Review of Systems:  Constitutional: negative  Eyes: negative  Ears, Nose, Mouth, Throat, and Face: negative  Respiratory: negative  Cardiovascular: negative  Gastrointestinal: positive for abdominal pain  Genitourinary:negative  Integument/Breast: negative  Hematologic/Lymphatic: negative  Musculoskeletal:negative  Neurological: negative  Behavioral/Psychiatric: negative    Objective:     Visit Vitals    /78 (BP 1 Location: Left arm, BP Patient Position: Sitting)    Pulse 82    Temp 97.6 °F (36.4 °C) (Oral)    Resp 16    Ht 5' 5\" (1.651 m)    Wt 84.3 kg (185 lb 12.8 oz)    SpO2 98%    BMI 30.92 kg/m2       Physical Exam:      General:  in no apparent distress and well developed and well nourished   Eyes:  conjunctivae and sclerae normal, pupils equal, round, reactive to light   Throat & Neck: no erythema or exudates noted and neck supple and symmetrical; no palpable masses   Lungs:   clear to auscultation bilaterally   Heart:  Regular rate and rhythm   Abdomen:   rounded, soft, Right upper quadrant tenderness with localized guarding, nondistended, no masses or organomegaly. No hernia. Extremities: extremities normal, atraumatic, no cyanosis or edema   Skin: Normal.         Imaging and Lab Review:     CBC:   Lab Results   Component Value Date/Time    WBC 9.4 04/11/2017 04:16 PM    RBC 4.26 04/11/2017 04:16 PM    HGB 12.9 04/11/2017 04:16 PM    HCT 38.6 04/11/2017 04:16 PM    PLATELET 998 98/37/8870 04:16 PM     BMP:   Lab Results   Component Value Date/Time    Glucose 113 04/11/2017 04:16 PM    Sodium 139 04/11/2017 04:16 PM    Potassium 3.6 04/11/2017 04:16 PM    Chloride 101 04/11/2017 04:16 PM    CO2 28 04/11/2017 04:16 PM    BUN 18 04/11/2017 04:16 PM    Creatinine 1.05 04/11/2017 04:16 PM    Calcium 9.4 04/11/2017 04:16 PM     CMP:  Lab Results   Component Value Date/Time    Glucose 113 04/11/2017 04:16 PM    Sodium 139 04/11/2017 04:16 PM    Potassium 3.6 04/11/2017 04:16 PM    Chloride 101 04/11/2017 04:16 PM    CO2 28 04/11/2017 04:16 PM    BUN 18 04/11/2017 04:16 PM    Creatinine 1.05 04/11/2017 04:16 PM    Calcium 9.4 04/11/2017 04:16 PM    Anion gap 10 04/11/2017 04:16 PM    BUN/Creatinine ratio 17 04/11/2017 04:16 PM    Alk. phosphatase 89 04/11/2017 04:16 PM    Protein, total 7.7 04/11/2017 04:16 PM    Albumin 3.7 04/11/2017 04:16 PM    Globulin 4.0 04/11/2017 04:16 PM    A-G Ratio 0.9 04/11/2017 04:16 PM       No results found for this or any previous visit (from the past 24 hour(s)). images and reports reviewed    Assessment:   Lucien Donovan is a 64 y.o. female is presenting with abdominal pain and a picture of cholecystitis. I explained the indications for robotic cholecystectomy as well as the alternatives.  I discussed the potential risks, including but not limited to bleeding, wound infection, trocar injuries, bile duct injury and leak, and also the possible need for conversion to open procedure. I also explained the firefly technology and how it allow us to visualize the biliary tree to avoid bile duct injury or leak. She indicates that she understands the risks, accepts and wishes to proceed. Plan:     1. Will schedule for robotic single-site cholecystectomy with firefly (ICG) technology for identification of the biliary tree. 2. No heavy lifting (more than 15 pounds) for 2 weeks after the surgery. 3. Avoid constipation after the surgery (take stool softener).       Please call me if you have any questions (cell phone: [phone])     Signed By: Deshaun Beltran MD     April 28, 2017

## 2017-04-28 NOTE — PATIENT INSTRUCTIONS
If you have any questions or concerns about today's appointment, the verbal and/or written instructions you were given for follow up care, please call our office at [phone]. [facility] - Patti Soto [address] [place] [address], [address] [place] [address] Road    [phone] office  [phone] fax      . Chales Minus PATIENT PRE AND POST OPERATIVE INSTRUCTIONS     [facility]     Before Surgery Instructions:   1) You must have someone available to drive you to and from your procedure and stay with you for the first 24 hours. 2) It is very important that you have nothing to eat or drink after midnight the night before your surgery. This includes chewing gum or sucking on hard candy. Take only heart, blood pressure and cholesterol medications the morning of surgery with only a sip of water. 3) Please stop taking Plavix 10 days prior to your surgery. Stop taking Coumadin 5 days prior to your surgery. Stop taking all Aspirin or Aspirin containing products 7 days prior to your surgery. Stop taking Advil, Motrin, Aleve, and etc. 3 days prior to your surgery. 4) If you take any diabetic medications please consult with your primary care physician on how to take them on the day of your surgery  5) Please stop all Herbal products 2 weeks prior to your surgery. 6) Please arrive at the hospital 2 hours prior to your surgery, unless you have been otherwise instructed. 7) Patients having an operation on their colon will be given a separate instruction sheet on their Bowel Prep. 8) For any pre-operative work up check in at the main entrance to [facility], and then go to Patient Registration. These studies are done on a walk in basis they are open from 7:00am to 5:00pm Monday through Friday. 9) Please wash your surgical site the morning of your surgery with soap and water.   10) If you are of child bearing age you will have pregnancy test done the morning of your surgery as soon as you arrive. After Surgery Instructions: You will need to be seen in the office for a follow-up visit 7-14 days after your surgery. Please call after you have had the procedure to make this appointment. Unless otherwise instructed, you may remove your outer bandage and shower 48 hours after your surgery. If you develop a fever greater than 101, have any significant drainage, bleeding, swelling and/or pus of the wound. Please call our office immediately. Surgery Date and Time: Friday, May 5, 2017 at 11:15am     Please check in at [facility],  enter through the main entrance and take the elevator to the second floor. Please check in by 9:15am the day of your surgery. You may contact Jaelyn Skinner with any questions at [phone].

## 2017-04-28 NOTE — TELEPHONE ENCOUNTER
Dr. Cheung [place] office is calling requesting clearance for a cholecystectomy in which they would like to do next week. Please advise.  Thank you

## 2017-04-28 NOTE — PROGRESS NOTES
Patient is a 64 y.o. female who presents for an evaluation of RUQ abdominal pain, which she scores as a 9/10, cholelithiasis, & a positive HIDA scan done on 04/21/17.

## 2017-04-28 NOTE — LETTER
4/28/2017 2:03 PM 
 
Patient:  Randolph Negro YOB: 1960 Date of Visit: 4/28/2017 Keisha Paez MD 
[place] [address] [address] [facility] [address] VIA In Basket Dear Keisha Paez MD, Thank you for referring Ms. Alice Adams to [place] [address] for evaluation and treatment. Below are the relevant portions of my assessment and plan of care. Thank you very much for your referral of Ms. Alice Adams. If you have questions, please do not hesitate to call me. I look forward to following Ms. Dominguez along with you and will keep you updated as to her progress. Sincerely, Greyson Field MD

## 2017-04-28 NOTE — TELEPHONE ENCOUNTER
Left message to contact our office to schedule surgery   (robotic single site cholecystectomy with firefly).

## 2017-04-30 ENCOUNTER — TELEPHONE (OUTPATIENT)
Dept: INTERNAL MEDICINE CLINIC | Age: 57
End: 2017-04-30

## 2017-05-01 NOTE — TELEPHONE ENCOUNTER
pls call    IMPRESSION:     No hepatic mass lesion is evident; the ultrasound impression is apparently  secondary to a scanning artifact. .    Cholelithiasis     CT shows no lesion

## 2017-05-04 ENCOUNTER — ANESTHESIA EVENT (OUTPATIENT)
Dept: SURGERY | Age: 57
End: 2017-05-04
Payer: COMMERCIAL

## 2017-05-05 ENCOUNTER — ANESTHESIA (OUTPATIENT)
Dept: SURGERY | Age: 57
End: 2017-05-05
Payer: COMMERCIAL

## 2017-05-05 ENCOUNTER — HOSPITAL ENCOUNTER (OUTPATIENT)
Age: 57
Setting detail: OUTPATIENT SURGERY
Discharge: HOME | End: 2017-05-05
Attending: SURGERY | Admitting: SURGERY
Payer: COMMERCIAL

## 2017-05-05 VITALS
RESPIRATION RATE: 20 BRPM | HEART RATE: 78 BPM | TEMPERATURE: 97.5 F | WEIGHT: 183.6 LBS | DIASTOLIC BLOOD PRESSURE: 65 MMHG | BODY MASS INDEX: 30.59 KG/M2 | HEIGHT: 65 IN | SYSTOLIC BLOOD PRESSURE: 109 MMHG | OXYGEN SATURATION: 93 %

## 2017-05-05 PROCEDURE — 77030002933 HC SUT MCRYL J&J -A: Performed by: SURGERY

## 2017-05-05 PROCEDURE — 77030026438 HC STYL ET INTUB CARD -A: Performed by: NURSE ANESTHETIST, CERTIFIED REGISTERED

## 2017-05-05 PROCEDURE — 88304 TISSUE EXAM BY PATHOLOGIST: CPT | Performed by: SURGERY

## 2017-05-05 PROCEDURE — 74011000250 HC RX REV CODE- 250: Performed by: SURGERY

## 2017-05-05 PROCEDURE — 76210000006 HC OR PH I REC 0.5 TO 1 HR: Performed by: SURGERY

## 2017-05-05 PROCEDURE — 74011250637 HC RX REV CODE- 250/637: Performed by: NURSE ANESTHETIST, CERTIFIED REGISTERED

## 2017-05-05 PROCEDURE — 74011250636 HC RX REV CODE- 250/636

## 2017-05-05 PROCEDURE — 77030002966 HC SUT PDS J&J -A: Performed by: SURGERY

## 2017-05-05 PROCEDURE — 77030008683 HC TU ET CUF COVD -A: Performed by: NURSE ANESTHETIST, CERTIFIED REGISTERED

## 2017-05-05 PROCEDURE — 77030011267 HC ELECTRD BLD COVD -A: Performed by: SURGERY

## 2017-05-05 PROCEDURE — 76210000020 HC REC RM PH II FIRST 0.5 HR: Performed by: SURGERY

## 2017-05-05 PROCEDURE — 74011250636 HC RX REV CODE- 250/636: Performed by: NURSE ANESTHETIST, CERTIFIED REGISTERED

## 2017-05-05 PROCEDURE — 77030020782 HC GWN BAIR PAWS FLX 3M -B: Performed by: SURGERY

## 2017-05-05 PROCEDURE — 76060000032 HC ANESTHESIA 0.5 TO 1 HR: Performed by: SURGERY

## 2017-05-05 PROCEDURE — 76010000933 HC OR TIME 0.5 TO 1HR INTENSV - TIER 2: Performed by: SURGERY

## 2017-05-05 PROCEDURE — 74011000250 HC RX REV CODE- 250

## 2017-05-05 PROCEDURE — 77030010939 HC CLP LIG TELE -B: Performed by: SURGERY

## 2017-05-05 PROCEDURE — 77030011640 HC PAD GRND REM COVD -A: Performed by: SURGERY

## 2017-05-05 PROCEDURE — 77030018836 HC SOL IRR NACL ICUM -A: Performed by: SURGERY

## 2017-05-05 PROCEDURE — 77030018706 HC CORD MPLR COVD -A: Performed by: SURGERY

## 2017-05-05 PROCEDURE — 74011250636 HC RX REV CODE- 250/636: Performed by: SURGERY

## 2017-05-05 PROCEDURE — 77030003580 HC NDL INSUF VERES J&J -B: Performed by: SURGERY

## 2017-05-05 RX ORDER — PROPOFOL 10 MG/ML
INJECTION, EMULSION INTRAVENOUS AS NEEDED
Status: DISCONTINUED | OUTPATIENT
Start: 2017-05-05 | End: 2017-05-05 | Stop reason: HOSPADM

## 2017-05-05 RX ORDER — SODIUM CHLORIDE 0.9 % (FLUSH) 0.9 %
5-10 SYRINGE (ML) INJECTION AS NEEDED
Status: DISCONTINUED | OUTPATIENT
Start: 2017-05-05 | End: 2017-05-05 | Stop reason: HOSPADM

## 2017-05-05 RX ORDER — FENTANYL CITRATE 50 UG/ML
INJECTION, SOLUTION INTRAMUSCULAR; INTRAVENOUS AS NEEDED
Status: DISCONTINUED | OUTPATIENT
Start: 2017-05-05 | End: 2017-05-05 | Stop reason: HOSPADM

## 2017-05-05 RX ORDER — ONDANSETRON 2 MG/ML
INJECTION INTRAMUSCULAR; INTRAVENOUS AS NEEDED
Status: DISCONTINUED | OUTPATIENT
Start: 2017-05-05 | End: 2017-05-05 | Stop reason: HOSPADM

## 2017-05-05 RX ORDER — INSULIN LISPRO 100 [IU]/ML
INJECTION, SOLUTION INTRAVENOUS; SUBCUTANEOUS ONCE
Status: DISCONTINUED | OUTPATIENT
Start: 2017-05-05 | End: 2017-05-05 | Stop reason: HOSPADM

## 2017-05-05 RX ORDER — INDOCYANINE GREEN AND WATER 25 MG
2.5 KIT INJECTION
Status: DISCONTINUED | OUTPATIENT
Start: 2017-05-05 | End: 2017-05-05 | Stop reason: HOSPADM

## 2017-05-05 RX ORDER — HYDROMORPHONE HYDROCHLORIDE 2 MG/ML
0.5 INJECTION, SOLUTION INTRAMUSCULAR; INTRAVENOUS; SUBCUTANEOUS
Status: DISCONTINUED | OUTPATIENT
Start: 2017-05-05 | End: 2017-05-05 | Stop reason: HOSPADM

## 2017-05-05 RX ORDER — SODIUM CHLORIDE, SODIUM LACTATE, POTASSIUM CHLORIDE, CALCIUM CHLORIDE 600; 310; 30; 20 MG/100ML; MG/100ML; MG/100ML; MG/100ML
75 INJECTION, SOLUTION INTRAVENOUS CONTINUOUS
Status: DISCONTINUED | OUTPATIENT
Start: 2017-05-05 | End: 2017-05-05 | Stop reason: HOSPADM

## 2017-05-05 RX ORDER — NEOSTIGMINE METHYLSULFATE 5 MG/5 ML
SYRINGE (ML) INTRAVENOUS AS NEEDED
Status: DISCONTINUED | OUTPATIENT
Start: 2017-05-05 | End: 2017-05-05 | Stop reason: HOSPADM

## 2017-05-05 RX ORDER — FAMOTIDINE 20 MG/1
20 TABLET, FILM COATED ORAL ONCE
Status: COMPLETED | OUTPATIENT
Start: 2017-05-05 | End: 2017-05-05

## 2017-05-05 RX ORDER — GLYCOPYRROLATE 0.2 MG/ML
INJECTION INTRAMUSCULAR; INTRAVENOUS AS NEEDED
Status: DISCONTINUED | OUTPATIENT
Start: 2017-05-05 | End: 2017-05-05 | Stop reason: HOSPADM

## 2017-05-05 RX ORDER — MAGNESIUM SULFATE 100 %
4 CRYSTALS MISCELLANEOUS AS NEEDED
Status: DISCONTINUED | OUTPATIENT
Start: 2017-05-05 | End: 2017-05-05 | Stop reason: HOSPADM

## 2017-05-05 RX ORDER — OXYCODONE AND ACETAMINOPHEN 5; 325 MG/1; MG/1
1 TABLET ORAL
Qty: 24 TAB | Refills: 0 | Status: SHIPPED | OUTPATIENT
Start: 2017-05-05 | End: 2017-12-08

## 2017-05-05 RX ORDER — ROCURONIUM BROMIDE 10 MG/ML
INJECTION, SOLUTION INTRAVENOUS AS NEEDED
Status: DISCONTINUED | OUTPATIENT
Start: 2017-05-05 | End: 2017-05-05 | Stop reason: HOSPADM

## 2017-05-05 RX ORDER — ONDANSETRON 2 MG/ML
4 INJECTION INTRAMUSCULAR; INTRAVENOUS AS NEEDED
Status: DISCONTINUED | OUTPATIENT
Start: 2017-05-05 | End: 2017-05-05 | Stop reason: HOSPADM

## 2017-05-05 RX ORDER — KETOROLAC TROMETHAMINE 30 MG/ML
INJECTION, SOLUTION INTRAMUSCULAR; INTRAVENOUS AS NEEDED
Status: DISCONTINUED | OUTPATIENT
Start: 2017-05-05 | End: 2017-05-05 | Stop reason: HOSPADM

## 2017-05-05 RX ORDER — MIDAZOLAM HYDROCHLORIDE 1 MG/ML
INJECTION, SOLUTION INTRAMUSCULAR; INTRAVENOUS AS NEEDED
Status: DISCONTINUED | OUTPATIENT
Start: 2017-05-05 | End: 2017-05-05 | Stop reason: HOSPADM

## 2017-05-05 RX ORDER — DEXAMETHASONE SODIUM PHOSPHATE 4 MG/ML
INJECTION, SOLUTION INTRA-ARTICULAR; INTRALESIONAL; INTRAMUSCULAR; INTRAVENOUS; SOFT TISSUE AS NEEDED
Status: DISCONTINUED | OUTPATIENT
Start: 2017-05-05 | End: 2017-05-05 | Stop reason: HOSPADM

## 2017-05-05 RX ORDER — CEFAZOLIN SODIUM 2 G/50ML
2 SOLUTION INTRAVENOUS
Status: COMPLETED | OUTPATIENT
Start: 2017-05-05 | End: 2017-05-05

## 2017-05-05 RX ORDER — SODIUM CHLORIDE 0.9 % (FLUSH) 0.9 %
5-10 SYRINGE (ML) INJECTION EVERY 8 HOURS
Status: DISCONTINUED | OUTPATIENT
Start: 2017-05-05 | End: 2017-05-05 | Stop reason: HOSPADM

## 2017-05-05 RX ORDER — LIDOCAINE HYDROCHLORIDE 20 MG/ML
INJECTION, SOLUTION EPIDURAL; INFILTRATION; INTRACAUDAL; PERINEURAL AS NEEDED
Status: DISCONTINUED | OUTPATIENT
Start: 2017-05-05 | End: 2017-05-05 | Stop reason: HOSPADM

## 2017-05-05 RX ORDER — SUCCINYLCHOLINE CHLORIDE 20 MG/ML
INJECTION INTRAMUSCULAR; INTRAVENOUS AS NEEDED
Status: DISCONTINUED | OUTPATIENT
Start: 2017-05-05 | End: 2017-05-05 | Stop reason: HOSPADM

## 2017-05-05 RX ORDER — DEXTROSE 50 % IN WATER (D50W) INTRAVENOUS SYRINGE
25-50 AS NEEDED
Status: DISCONTINUED | OUTPATIENT
Start: 2017-05-05 | End: 2017-05-05 | Stop reason: HOSPADM

## 2017-05-05 RX ADMIN — FAMOTIDINE 20 MG: 20 TABLET ORAL at 10:00

## 2017-05-05 RX ADMIN — CEFAZOLIN SODIUM 2 G: 2 SOLUTION INTRAVENOUS at 12:15

## 2017-05-05 RX ADMIN — ROCURONIUM BROMIDE 20 MG: 10 INJECTION, SOLUTION INTRAVENOUS at 12:35

## 2017-05-05 RX ADMIN — ROCURONIUM BROMIDE 5 MG: 10 INJECTION, SOLUTION INTRAVENOUS at 12:29

## 2017-05-05 RX ADMIN — ONDANSETRON 4 MG: 2 INJECTION INTRAMUSCULAR; INTRAVENOUS at 13:01

## 2017-05-05 RX ADMIN — DEXAMETHASONE SODIUM PHOSPHATE 4 MG: 4 INJECTION, SOLUTION INTRA-ARTICULAR; INTRALESIONAL; INTRAMUSCULAR; INTRAVENOUS; SOFT TISSUE at 12:37

## 2017-05-05 RX ADMIN — PROPOFOL 170 MG: 10 INJECTION, EMULSION INTRAVENOUS at 12:29

## 2017-05-05 RX ADMIN — FENTANYL CITRATE 50 MCG: 50 INJECTION, SOLUTION INTRAMUSCULAR; INTRAVENOUS at 12:37

## 2017-05-05 RX ADMIN — KETOROLAC TROMETHAMINE 30 MG: 30 INJECTION, SOLUTION INTRAMUSCULAR; INTRAVENOUS at 13:04

## 2017-05-05 RX ADMIN — ONDANSETRON 4 MG: 2 INJECTION INTRAMUSCULAR; INTRAVENOUS at 13:35

## 2017-05-05 RX ADMIN — FENTANYL CITRATE 100 MCG: 50 INJECTION, SOLUTION INTRAMUSCULAR; INTRAVENOUS at 12:29

## 2017-05-05 RX ADMIN — HYDROMORPHONE HYDROCHLORIDE 0.5 MG: 2 INJECTION, SOLUTION INTRAMUSCULAR; INTRAVENOUS; SUBCUTANEOUS at 13:52

## 2017-05-05 RX ADMIN — SODIUM CHLORIDE, SODIUM LACTATE, POTASSIUM CHLORIDE, AND CALCIUM CHLORIDE 75 ML/HR: 600; 310; 30; 20 INJECTION, SOLUTION INTRAVENOUS at 10:00

## 2017-05-05 RX ADMIN — SUCCINYLCHOLINE CHLORIDE 120 MG: 20 INJECTION INTRAMUSCULAR; INTRAVENOUS at 12:29

## 2017-05-05 RX ADMIN — LIDOCAINE HYDROCHLORIDE 60 MG: 20 INJECTION, SOLUTION EPIDURAL; INFILTRATION; INTRACAUDAL; PERINEURAL at 12:29

## 2017-05-05 RX ADMIN — GLYCOPYRROLATE 0.8 MG: 0.2 INJECTION INTRAMUSCULAR; INTRAVENOUS at 13:01

## 2017-05-05 RX ADMIN — MIDAZOLAM HYDROCHLORIDE 2 MG: 1 INJECTION, SOLUTION INTRAMUSCULAR; INTRAVENOUS at 12:15

## 2017-05-05 RX ADMIN — HYDROMORPHONE HYDROCHLORIDE 0.5 MG: 2 INJECTION, SOLUTION INTRAMUSCULAR; INTRAVENOUS; SUBCUTANEOUS at 13:32

## 2017-05-05 RX ADMIN — Medication 4 MG: at 13:01

## 2017-05-05 RX ADMIN — HYDROMORPHONE HYDROCHLORIDE 0.5 MG: 2 INJECTION, SOLUTION INTRAMUSCULAR; INTRAVENOUS; SUBCUTANEOUS at 13:42

## 2017-05-05 NOTE — ACP (ADVANCE CARE PLANNING)
Assisted patient with the execution of Advance Medical Directive. Placed the copy into patient's \"like paper chart. \"  See Flow Sheet for other pastoral interventions. Chaplains will continue to follow and provide pastoral care on an as needed/requested basis.     [facility] Certified [address] [place] [address]   [phone]

## 2017-05-05 NOTE — DISCHARGE INSTRUCTIONS
Instructions Following Ambulatory Surgery    Patient: Marlene Sinha MRN: [id number]  SSN: [id number]    YOB: 1960  Age: 64 y.o. Sex: female      Activity  · As tolerated, walking encourage, stairs are okay  · Avoid strenuous activities - no lifting anything heavier than 15 pounds. · You may shower at home after 48 hours. Diet  · Regular diet after nausea from the anesthetic has passed. Pain  · Take pain medication as directed by your doctor  ·  Call your doctor if pain is NOT relieved by medication    Dressing Care  · Remove outer dressing (if any) after 48 hours. Leave steri-strips (if any) in place until they fall off. After Anesthesia  · For the first 24 hours: DO NOT Drive, Drink alcoholic beverages, or Make important decisions  · Be aware of dizziness following anesthesia and while taking pain medication    Call your doctor if  · Excessive bleeding that does not stop after holding mild pressure over the area  · Temperature of 101 degrees F or above  · Redness,excessive swelling or bruising, and/or green or yellow, smelly discharge from incision  · If nausea and vomiting continues    Follow-Up Phone Calls  · Call the office at [phone] to make your follow-up appointment    Appointment date/time: 2 weeks after the surgery. Dr. Tommie Escamilla cell phone number is [phone]. Please call me if you have any concerns or questions. Cholecystectomy: What to Expect at [facility]  After your surgery, it is normal to feel weak and tired for several days after you return home. Your belly may be swollen. If you had laparoscopic surgery, you may also have pain in your shoulder for about 24 hours. You may have gas or need to burp a lot at first, and a few people get diarrhea. The diarrhea usually goes away in 2 to 4 weeks, but it may last longer. How quickly you recover depends on whether you had a laparoscopic or open surgery.   · For a laparoscopic surgery, most people can go back to work or their normal routine in 1 to 2 weeks, but it may take longer, depending on the type of work you do. · For an open surgery, it will probably take 4 to 6 weeks before you get back to your normal routine. This care sheet gives you a general idea about how long it will take for you to recover. However, each person recovers at a different pace. Follow the steps below to get better as quickly as possible. How can you care for yourself at home? Activity  · Rest when you feel tired. Getting enough sleep will help you recover. · Try to walk each day. Start out by walking a little more than you did the day before. Gradually increase the amount you walk. Walking boosts blood flow and helps prevent pneumonia and constipation. · For about 2 to 4 weeks, avoid lifting anything that would make you strain. This may include a child, heavy grocery bags and milk containers, a heavy briefcase or backpack, cat litter or dog food bags, or a vacuum . · Avoid strenuous activities, such as biking, jogging, weightlifting, and aerobic exercise, until your doctor says it is okay. · You may shower 24 to 48 hours after surgery, if your doctor okays it. Pat the cut (incision) dry. Do not take a bath for the first 2 weeks, or until your doctor tells you it is okay. · You may drive when you are no longer taking pain medicine and can quickly move your foot from the gas pedal to the brake. You must also be able to sit comfortably for a long period of time, even if you do not plan to go far. You might get caught in traffic. · For a laparoscopic surgery, most people can go back to work or their normal routine in 1 to 2 weeks, but it may take longer. For an open surgery, it will probably take 4 to 6 weeks before you get back to your normal routine. · Your doctor will tell you when you can have sex again. Diet  · Eat smaller meals more often instead of fewer larger meals.  You can eat a normal diet, but avoid eating fatty foods for about 1 month. Fatty foods include hamburger, whole milk, cheese, and many snack foods. If your stomach is upset, try bland, low-fat foods like plain rice, broiled chicken, toast, and yogurt. · Drink plenty of fluids (unless your doctor tells you not to). · If you have diarrhea, try avoiding spicy foods, dairy products, fatty foods, and alcohol. You can also watch to see if specific foods cause it, and stop eating them. If the diarrhea continues for more than 2 weeks, talk to your doctor. · You may notice that your bowel movements are not regular right after your surgery. This is common. Try to avoid constipation and straining with bowel movements. You may want to take a fiber supplement every day. If you have not had a bowel movement after a couple of days, ask your doctor about taking a mild laxative. Medicines  · Your doctor will tell you if and when you can restart your medicines. He or she will also give you instructions about taking any new medicines. · If you take blood thinners, such as warfarin (Coumadin), clopidogrel (Plavix), or aspirin, be sure to talk to your doctor. He or she will tell you if and when to start taking those medicines again. Make sure that you understand exactly what your doctor wants you to do. · Take pain medicines exactly as directed. ¨ If the doctor gave you a prescription medicine for pain, take it as prescribed. ¨ If you are not taking a prescription pain medicine, take an over-the-counter medicine such as acetaminophen (Tylenol), ibuprofen (Advil, Motrin), or naproxen (Aleve). Read and follow all instructions on the label. ¨ Do not take two or more pain medicines at the same time unless the doctor told you to. Many pain medicines contain acetaminophen, which is Tylenol. Too much Tylenol can be harmful.   · If you think your pain medicine is making you sick to your stomach:  ¨ Take your medicine after meals (unless your doctor tells you not to).  ¨ Ask your doctor for a different pain medicine. · If your doctor prescribed antibiotics, take them as directed. Do not stop taking them just because you feel better. You need to take the full course of antibiotics. Incision care  · If you have strips of tape on the incision, or cut, leave the tape on for a week or until it falls off. · After 24 to 48 hours, wash the area daily with warm, soapy water, and pat it dry. · You may have staples to hold the cut together. Keep them dry until your doctor takes them out. This is usually in 7 to 10 days. · Keep the area clean and dry. You may cover it with a gauze bandage if it weeps or rubs against clothing. Change the bandage every day. Ice  · To reduce swelling and pain, put ice or a cold pack on your belly for 10 to 20 minutes at a time. Do this every 1 to 2 hours. Put a thin cloth between the ice and your skin. Follow-up care is a key part of your treatment and safety. Be sure to make and go to all appointments, and call your doctor if you are having problems. It's also a good idea to know your test results and keep a list of the medicines you take. When should you call for help? Call 911 anytime you think you may need emergency care. For example, call if:  · You passed out (lost consciousness). · You have severe trouble breathing. · You have sudden chest pain and shortness of breath, or you cough up blood. Call your doctor now or seek immediate medical care if:  · You are sick to your stomach and cannot drink fluids. · You have pain that does not get better when you take your pain medicine. · You have signs of infection, such as:  ¨ Increased pain, swelling, warmth, or redness. ¨ Red streaks leading from the incision. ¨ Pus draining from the incision. ¨ Swollen lymph nodes in your neck, armpits, or groin. ¨ A fever. · Your urine turns dark brown or your stool is light-colored or fuad-colored.   · Your skin or the whites of your eyes turn yellow. · Bright red blood has soaked through a large bandage over your incision. · You have signs of a blood clot, such as:  ¨ Pain in your calf, back of knee, thigh, or groin. ¨ Redness and swelling in your leg or groin. · You have trouble passing urine or stool, especially if you have mild pain or swelling in your lower belly. Watch closely for any changes in your health, and be sure to contact your doctor if:  · You had a laparoscopic surgery and your shoulder pain lasts more than 24 hours. · You do not have a bowel movement after taking a laxative. Where can you learn more? Go to http://[employer].info/. Enter 342 35 [phone] in the search box to learn more about \"Cholecystectomy: What to Expect at Home. \"  Current as of: August 9, 2016  Content Version: 11.2  © 1065-1419 [employer]. Care instructions adapted under license by [employer] (which disclaims liability or warranty for this information). If you have questions about a medical condition or this instruction, always ask your healthcare professional. [place] [address] any warranty or liability for your use of this information. Narcotic-Analgesic/Acetaminophen (Percocet, Norco, Lorcet HD, Lortab 10/325) - (By mouth)   Why this medicine is used:   Relieves pain. Contact a nurse or doctor right away if you have:  · Extreme weakness, shallow breathing, slow heartbeat  · Severe confusion, lightheadedness, dizziness, fainting  · Yellow skin or eyes, dark urine or pale stools  · Severe constipation, severe stomach pain, nausea, vomiting, loss of appetite  · Sweating or cold, clammy skin     Common side effects:  · Mild constipation, nausea, vomiting  · Sleepiness, tiredness  · Itching, rash  © 2017 [address] [employer] Information is for End User's use only and may not be sold, redistributed or otherwise used for commercial purposes.     DISCHARGE SUMMARY from Nurse    The following personal items are in your possession at time of discharge:    Dental Appliances: None  Visual Aid: Glasses     Home Medications: None  Jewelry: None  Clothing: Undergarments, Socks, Footwear, Pants, Shirt  Other Valuables: None   PATIENT INSTRUCTIONS:    After general anesthesia or intravenous sedation, for 24 hours or while taking prescription Narcotics:  · Limit your activities  · Do not drive and operate hazardous machinery  · Do not make important personal or business decisions  · Do  not drink alcoholic beverages  · If you have not urinated within 8 hours after discharge, please contact your surgeon on call. Report the following to your surgeon:  · Excessive pain, swelling, redness or odor of or around the surgical area  · Temperature over 100.5  · Nausea and vomiting lasting longer than 4 hours or if unable to take medications  · Any signs of decreased circulation or nerve impairment to extremity: change in color, persistent  numbness, tingling, coldness or increase pain  · Any questions    *  Please give a list of your current medications to your Primary Care Provider. *  Please update this list whenever your medications are discontinued, doses are      changed, or new medications (including over-the-counter products) are added. *  Please carry medication information at all times in case of emergency situations. These are general instructions for a healthy lifestyle:    No smoking/ No tobacco products/ Avoid exposure to second hand smoke    Surgeon General's Warning:  Quitting smoking now greatly reduces serious risk to your health.     Obesity, smoking, and sedentary lifestyle greatly increases your risk for illness    A healthy diet, regular physical exercise & weight monitoring are important for maintaining a healthy lifestyle    You may be retaining fluid if you have a history of heart failure or if you experience any of the following symptoms:  Weight gain of 3 pounds or more overnight or 5 pounds in a week, increased swelling in our hands or feet or shortness of breath while lying flat in bed. Please call your doctor as soon as you notice any of these symptoms; do not wait until your next office visit. Recognize signs and symptoms of STROKE:    F-face looks uneven    A-arms unable to move or move unevenly    S-speech slurred or non-existent    T-time-call 911 as soon as signs and symptoms begin-DO NOT go       Back to bed or wait to see if you get better-TIME IS BRAIN. Warning Signs of HEART ATTACK     Call 911 if you have these symptoms:   Chest discomfort. Most heart attacks involve discomfort in the center of the chest that lasts more than a few minutes, or that goes away and comes back. It can feel like uncomfortable pressure, squeezing, fullness, or pain.  Discomfort in other areas of the upper body. Symptoms can include pain or discomfort in one or both arms, the back, neck, jaw, or stomach.  Shortness of breath with or without chest discomfort.  Other signs may include breaking out in a cold sweat, nausea, or lightheadedness. Don't wait more than five minutes to call 911 - MINUTES MATTER! Fast action can save your life. Calling 911 is almost always the fastest way to get lifesaving treatment. Emergency Medical Services staff can begin treatment when they arrive -- up to an hour sooner than if someone gets to the hospital by car. The discharge information has been reviewed with the patient and spouse. The patient and spouse verbalized understanding. Discharge medications reviewed with the patient and spouse and appropriate educational materials and side effects teaching were provided.

## 2017-05-05 NOTE — OP NOTES
[address]    Name:  Carlos Gallardo  MR#:  [id number]  :  1960  Account #:  [de-identified]  Date of Adm:  2017  Date of Surgery:  2017      SURGEON: Parish Lu. Kelley Gerber MD    PREOPERATIVE DIAGNOSIS: Chronic cholecystitis. POSTOPERATIVE DIAGNOSIS: Acute and chronic cholecystitis. PROCEDURES PERFORMED:    ANESTHESIA: General.    COMPLICATIONS: None. SPECIMENS REMOVED: Gallbladder. ESTIMATED BLOOD LOSS: Minimal.    DESCRIPTION OF PROCEDURE: The patient was brought to the  operating room, anesthesia was induced. Scrubbing and draping of the  abdomen was done in the usual manner. A timeout was performed. A  skin incision inside the umbilicus was performed. Veress needle was  inserted. Abdomen was insufflated. The fascia was opened, the  abdomen was entered. There was no adhesion. The single port was  inserted. The robot was docked. The gallbladder was very thick and  inflamed. I was not able to grab it so it was aspirated. There was white  bile, and after aspiration of 20 mL, the gallbladder was inspected  cephalad. There was adhesion between the gallbladder and omentum. These were taken down using electrocautery. At this point, the cystic  duct was dissected. Firefly was used to identify the biliary tree. The  cystic duct was clipped and divided. The cystic artery was cauterized. The gallbladder was taken out using electrocautery from the liver bed. The gallbladder and the single port were taken out through the single  incision. The fascia was closed with figure-of-eight #1 PDS sutures,  and the skin was closed with 4-0 Monocryl. Sterile dressing was  applied.         Jordyn Eubanks MD    YY / TB  D:  2017   13:16  T:  2017   15:58  Job #:  [id number]

## 2017-05-05 NOTE — IP AVS SNAPSHOT
G[facility] Screen 
 
 
 [address] [facility] [address] 
[phone] Patient: Jelly El MRN: [id number] :1960 You are allergic to the following Allergen Reactions Adhesive Tape-Silicones Contact Dermatitis Bee Sting (Sting, Bee) Anaphylaxis Benzoyl Peroxide Rash Betadine (Povidone-Iodine) Rash Chlorhexidine Towelette Rash Codeine Nausea and Vomiting Contrave (Naltrexone-Bupropion) Other (comments) Vision changes Recent Documentation Height Weight BMI OB Status Smoking Status 1.651 m 83.3 kg 30.55 kg/m2 Postmenopausal Former Smoker Emergency Contacts Name Discharge Info Relation Home Work Mobile Joy Denson DISCHARGE CAREGIVER [3] Spouse [3] [phone] Reji Cantu  Spouse [3] [phone] About your hospitalization You were admitted on: May 5, 2017 You last received care in the:  [facility] PACU You were discharged on: May 5, 2017 Unit phone number:  [phone] Why you were hospitalized Your primary diagnosis was:  Not on File Providers Seen During Your Hospitalizations Provider Role Specialty Primary office phone Katie Wesley MD Attending Provider Surgery [phone] Your Primary Care Physician (PCP) Primary Care Physician Office Phone Office Fax Joes Fee [phone] Follow-up Information Follow up With Details Comments Contact Info Jillian Rodriguez MD   [address] [facility] [address] [facility] 
[phone] Katie Wesley MD  2 week follow up [address] [facility] [address]  [facility] [place] [address] [id number] [phone] Your Appointments Friday May 19, 2017  1:00 PM EDT  
POST OP with Katie Wesley MD  
[facility] [address] [place] [address] 240 200 [facility]  
[phone] Wednesday June 14, 2017  3:40 PM EDT CARELINK with [address] [facility] [place] [address] [address] [address] [phone] Current Discharge Medication List  
  
START taking these medications Dose & Instructions Dispensing Information Comments Morning Noon Evening Bedtime  
 oxyCODONE-acetaminophen 5-325 mg per tablet Commonly known as:  PERCOCET Your last dose was: Your next dose is:    
   
   
 Dose:  1 Tab Take 1 Tab by mouth every four (4) hours as needed for Pain. Max Daily Amount: 6 Tabs. Quantity:  24 Tab Refills:  0 CONTINUE these medications which have NOT CHANGED Dose & Instructions Dispensing Information Comments Morning Noon Evening Bedtime  
 calcium-cholecalciferol (D3) tablet Commonly known as:  CALTRATE 600+D Your last dose was: Your next dose is:    
   
   
 Dose:  1 Tab Take 1 Tab by mouth daily. Refills:  0 CENTRUM SILVER WOMEN PO Your last dose was: Your next dose is:    
   
   
 Dose:  1 Tab Take 1 Tab by mouth daily. Refills:  0  
     
   
   
   
  
 EPIPEN 2-PRITI 0.3 mg/0.3 mL injection Generic drug:  EPINEPHrine Your last dose was: Your next dose is: INJECT 0.3 ML BY INTRAMUSCULAR ROUTE ONCE AS NEEDED FOR UP TO 1 DOSE. Quantity:  2 Syringe Refills:  5 PT PREFERS GENERIC  
    
   
   
   
  
 fish oil-dha-epa 1,200-144-216 mg Cap Your last dose was: Your next dose is:    
   
   
 Dose:  1 Cap Take 1 Cap by mouth daily. Refills:  0 MIRAPEX 1 mg tablet Generic drug:  pramipexole Your last dose was: Your next dose is:    
   
   
 Dose:  2 mg Take 2 mg by mouth nightly. Refills:  0 Where to Get Your Medications Information on where to get these meds will be given to you by the nurse or doctor. ! Ask your nurse or doctor about these medications  
  oxyCODONE-acetaminophen 5-325 mg per tablet Discharge Instructions Instructions Following Ambulatory Surgery Patient: Calvin Cruz MRN: [id number]  SSN: [id number] YOB: 1960  Age: 64 y.o. Sex: female Activity · As tolerated, walking encourage, stairs are okay · Avoid strenuous activities - no lifting anything heavier than 15 pounds. · You may shower at home after 48 hours. Diet · Regular diet after nausea from the anesthetic has passed. Pain · Take pain medication as directed by your doctor ·  Call your doctor if pain is NOT relieved by medication Dressing Care · Remove outer dressing (if any) after 48 hours. Leave steri-strips (if any) in place until they fall off. After Anesthesia · For the first 24 hours: DO NOT Drive, Drink alcoholic beverages, or Make important decisions · Be aware of dizziness following anesthesia and while taking pain medication Call your doctor if 
· Excessive bleeding that does not stop after holding mild pressure over the area · Temperature of 101 degrees F or above · Redness,excessive swelling or bruising, and/or green or yellow, smelly discharge from incision · If nausea and vomiting continues Follow-Up Phone Calls · Call the office at [phone] to make your follow-up appointment Appointment date/time: 2 weeks after the surgery. Dr. Jose Santiago cell phone number is [phone]. Please call me if you have any concerns or questions. Cholecystectomy: What to Expect at [facility] Your Recovery After your surgery, it is normal to feel weak and tired for several days after you return home. Your belly may be swollen. If you had laparoscopic surgery, you may also have pain in your shoulder for about 24 hours. You may have gas or need to burp a lot at first, and a few people get diarrhea. The diarrhea usually goes away in 2 to 4 weeks, but it may last longer. How quickly you recover depends on whether you had a laparoscopic or open surgery. · For a laparoscopic surgery, most people can go back to work or their normal routine in 1 to 2 weeks, but it may take longer, depending on the type of work you do. · For an open surgery, it will probably take 4 to 6 weeks before you get back to your normal routine. This care sheet gives you a general idea about how long it will take for you to recover. However, each person recovers at a different pace. Follow the steps below to get better as quickly as possible. How can you care for yourself at home? Activity · Rest when you feel tired. Getting enough sleep will help you recover. · Try to walk each day. Start out by walking a little more than you did the day before. Gradually increase the amount you walk. Walking boosts blood flow and helps prevent pneumonia and constipation. · For about 2 to 4 weeks, avoid lifting anything that would make you strain. This may include a child, heavy grocery bags and milk containers, a heavy briefcase or backpack, cat litter or dog food bags, or a vacuum . · Avoid strenuous activities, such as biking, jogging, weightlifting, and aerobic exercise, until your doctor says it is okay. · You may shower 24 to 48 hours after surgery, if your doctor okays it. Pat the cut (incision) dry. Do not take a bath for the first 2 weeks, or until your doctor tells you it is okay. · You may drive when you are no longer taking pain medicine and can quickly move your foot from the gas pedal to the brake. You must also be able to sit comfortably for a long period of time, even if you do not plan to go far. You might get caught in traffic.  
· For a laparoscopic surgery, most people can go back to work or their normal routine in 1 to 2 weeks, but it may take longer. For an open surgery, it will probably take 4 to 6 weeks before you get back to your normal routine. · Your doctor will tell you when you can have sex again. Diet · Eat smaller meals more often instead of fewer larger meals. You can eat a normal diet, but avoid eating fatty foods for about 1 month. Fatty foods include hamburger, whole milk, cheese, and many snack foods. If your stomach is upset, try bland, low-fat foods like plain rice, broiled chicken, toast, and yogurt. · Drink plenty of fluids (unless your doctor tells you not to). · If you have diarrhea, try avoiding spicy foods, dairy products, fatty foods, and alcohol. You can also watch to see if specific foods cause it, and stop eating them. If the diarrhea continues for more than 2 weeks, talk to your doctor. · You may notice that your bowel movements are not regular right after your surgery. This is common. Try to avoid constipation and straining with bowel movements. You may want to take a fiber supplement every day. If you have not had a bowel movement after a couple of days, ask your doctor about taking a mild laxative. Medicines · Your doctor will tell you if and when you can restart your medicines. He or she will also give you instructions about taking any new medicines. · If you take blood thinners, such as warfarin (Coumadin), clopidogrel (Plavix), or aspirin, be sure to talk to your doctor. He or she will tell you if and when to start taking those medicines again. Make sure that you understand exactly what your doctor wants you to do. · Take pain medicines exactly as directed. ¨ If the doctor gave you a prescription medicine for pain, take it as prescribed. ¨ If you are not taking a prescription pain medicine, take an over-the-counter medicine such as acetaminophen (Tylenol), ibuprofen (Advil, Motrin), or naproxen (Aleve). Read and follow all instructions on the label. ¨ Do not take two or more pain medicines at the same time unless the doctor told you to. Many pain medicines contain acetaminophen, which is Tylenol. Too much Tylenol can be harmful. · If you think your pain medicine is making you sick to your stomach: 
¨ Take your medicine after meals (unless your doctor tells you not to). ¨ Ask your doctor for a different pain medicine. · If your doctor prescribed antibiotics, take them as directed. Do not stop taking them just because you feel better. You need to take the full course of antibiotics. Incision care · If you have strips of tape on the incision, or cut, leave the tape on for a week or until it falls off. · After 24 to 48 hours, wash the area daily with warm, soapy water, and pat it dry. · You may have staples to hold the cut together. Keep them dry until your doctor takes them out. This is usually in 7 to 10 days. · Keep the area clean and dry. You may cover it with a gauze bandage if it weeps or rubs against clothing. Change the bandage every day. Ice · To reduce swelling and pain, put ice or a cold pack on your belly for 10 to 20 minutes at a time. Do this every 1 to 2 hours. Put a thin cloth between the ice and your skin. Follow-up care is a key part of your treatment and safety. Be sure to make and go to all appointments, and call your doctor if you are having problems. It's also a good idea to know your test results and keep a list of the medicines you take. When should you call for help? Call 911 anytime you think you may need emergency care. For example, call if: 
· You passed out (lost consciousness). · You have severe trouble breathing. · You have sudden chest pain and shortness of breath, or you cough up blood. Call your doctor now or seek immediate medical care if: 
· You are sick to your stomach and cannot drink fluids. · You have pain that does not get better when you take your pain medicine. · You have signs of infection, such as: ¨ Increased pain, swelling, warmth, or redness. ¨ Red streaks leading from the incision. ¨ Pus draining from the incision. ¨ Swollen lymph nodes in your neck, armpits, or groin. ¨ A fever. · Your urine turns dark brown or your stool is light-colored or fuad-colored. · Your skin or the whites of your eyes turn yellow. · Bright red blood has soaked through a large bandage over your incision. · You have signs of a blood clot, such as: 
¨ Pain in your calf, back of knee, thigh, or groin. ¨ Redness and swelling in your leg or groin. · You have trouble passing urine or stool, especially if you have mild pain or swelling in your lower belly. Watch closely for any changes in your health, and be sure to contact your doctor if: 
· You had a laparoscopic surgery and your shoulder pain lasts more than 24 hours. · You do not have a bowel movement after taking a laxative. Where can you learn more? Go to http://mima-jose c.info/. Enter 088 01 [id number] in the search box to learn more about \"Cholecystectomy: What to Expect at Home. \" Current as of: August 9, 2016 Content Version: 11.2 © 4450-6358 [employer]. Care instructions adapted under license by [employer] (which disclaims liability or warranty for this information). If you have questions about a medical condition or this instruction, always ask your healthcare professional. [place] [address] any warranty or liability for your use of this information. Narcotic-Analgesic/Acetaminophen (Percocet, Norco, Lorcet HD, Lortab 10/325) - (By mouth) Why this medicine is used:  
Relieves pain. Contact a nurse or doctor right away if you have: 
· Extreme weakness, shallow breathing, slow heartbeat · Severe confusion, lightheadedness, dizziness, fainting · Yellow skin or eyes, dark urine or pale stools · Severe constipation, severe stomach pain, nausea, vomiting, loss of appetite · Sweating or cold, clammy skin Common side effects: · Mild constipation, nausea, vomiting · Sleepiness, tiredness · Itching, rash © 2017 [facility] Information is for End User's use only and may not be sold, redistributed or otherwise used for commercial purposes. DISCHARGE SUMMARY from Nurse The following personal items are in your possession at time of discharge: 
 
Dental Appliances: None Visual Aid: Glasses Home Medications: None Jewelry: None Clothing: Undergarments, Socks, Footwear, Pants, Shirt Other Valuables: None PATIENT INSTRUCTIONS: 
 
 
F-face looks uneven A-arms unable to move or move unevenly S-speech slurred or non-existent T-time-call 911 as soon as signs and symptoms begin-DO NOT go Back to bed or wait to see if you get better-TIME IS BRAIN. Warning Signs of HEART ATTACK Call 911 if you have these symptoms: 
? Chest discomfort. Most heart attacks involve discomfort in the center of the chest that lasts more than a few minutes, or that goes away and comes back. It can feel like uncomfortable pressure, squeezing, fullness, or pain. ? Discomfort in other areas of the upper body. Symptoms can include pain or discomfort in one or both arms, the back, neck, jaw, or stomach. ? Shortness of breath with or without chest discomfort. ? Other signs may include breaking out in a cold sweat, nausea, or lightheadedness. Don't wait more than five minutes to call [address]! Fast action can save your life. Calling 911 is almost always the fastest way to get lifesaving treatment. Emergency Medical Services staff can begin treatment when they arrive  up to an hour sooner than if someone gets to the hospital by car. The discharge information has been reviewed with the patient and spouse. The patient and spouse verbalized understanding. Discharge medications reviewed with the patient and spouse and appropriate educational materials and side effects teaching were provided. Discharge Orders None Introducing [facility]! Dear Nehal Marshall: Thank you for requesting a [employer] account. Our records indicate that you already have an active [employer] account. You can access your account anytime at [email or web address] [place]. [employer] [place] Did you know that you can access your hospital and ER discharge instructions at any time in [employer]? You can also review all of your test results from your hospital stay or ER visit. Additional Information If you have questions, please visit the Frequently Asked Questions section of the [employer] website at [email or web address] [employer] [place]/. Remember, [employer] is NOT to be used for urgent needs. For medical emergencies, dial 911. Now available from your iPhone and Android! General Information Please provide this summary of care documentation to your next provider. Patient Signature:  ____________________________________________________________ Date:  ____________________________________________________________  
  
Padmini Newton Provider Signature:  ____________________________________________________________ Date:  ____________________________________________________________

## 2017-05-05 NOTE — ANESTHESIA PREPROCEDURE EVALUATION
Anesthetic History   No history of anesthetic complications            Review of Systems / Medical History  Patient summary reviewed and pertinent labs reviewed    Pulmonary            Asthma (Exercise induced)        Neuro/Psych   Within defined limits           Cardiovascular            Dysrhythmias (S/P ablation. Does have occassional PVCs) : PVC  Pacemaker (Last checked-1 week ago)    Exercise tolerance: >4 METS     GI/Hepatic/Renal     GERD (Food related)           Endo/Other      Hypothyroidism: well controlled  Morbid obesity and arthritis     Other Findings   Comments:   Risk Factors for Postoperative nausea/vomiting:       History of postoperative nausea/vomiting? NO       Female? YES       Motion sickness? NO       Intended opioid administration for postoperative analgesia? Yes      Smoking Abstinence  Current Smoker? NO  Elective Surgery? YES  Seen preoperatively by anesthesiologist or proxy prior to day of surgery? YES  Pt abstained from smoking 24 hours prior to anesthesia?  YES           Physical Exam    Airway  Mallampati: II  TM Distance: 4 - 6 cm  Neck ROM: normal range of motion   Mouth opening: Normal     Cardiovascular  Regular rate and rhythm,  S1 and S2 normal,  no murmur, click, rub, or gallop             Dental  No notable dental hx       Pulmonary  Breath sounds clear to auscultation               Abdominal  GI exam deferred       Other Findings            Anesthetic Plan    ASA: 2  Anesthesia type: general          Induction: Intravenous  Anesthetic plan and risks discussed with: Patient

## 2017-05-05 NOTE — ANESTHESIA POSTPROCEDURE EVALUATION
Post-Anesthesia Evaluation and Assessment    Patient: Duy Bond MRN: [id number]  SSN: [id number]    YOB: 1960  Age: 64 y.o. Sex: female       Cardiovascular Function/Vital Signs  Visit Vitals    /55    Pulse 73    Temp 36.7 °C (98 °F)    Resp 21    Ht 5' 5\" (1.651 m)    Wt 83.3 kg (183 lb 9.6 oz)    SpO2 100%    BMI 30.55 kg/m2       Patient is status post general anesthesia for Procedure(s):  CHOLECYSTECTOMY XI ROBOTIC ASSISTED SINGLE SITE/FIREFLY. Nausea/Vomiting: None    Postoperative hydration reviewed and adequate. Pain:  Pain Scale 1: Numeric (0 - 10) (05/05/17 1412)  Pain Intensity 1: 4 (05/05/17 1412)   Managed    Neurological Status:   Neuro (WDL): Within Defined Limits (05/05/17 1318)   At baseline    Mental Status and Level of Consciousness: Arousable    Pulmonary Status:   O2 Device: Room air (05/05/17 1338)   Adequate oxygenation and airway patent    Complications related to anesthesia: None    Post-anesthesia assessment completed.  No concerns    Signed By: Nancy Noland MD     May 5, 2017

## 2017-05-05 NOTE — PROGRESS NOTES
Date of Surgery Update:  Jac Middleton was seen and examined. History and physical has been reviewed. The patient has been examined. There have been no significant clinical changes since the completion of the originally dated History and Physical. Will proceed with robotic single-site cholecystectomy with firefly.      Signed By: Sotero Pallas, MD     May 5, 2017 11:41 AM

## 2017-05-05 NOTE — PROGRESS NOTES
Assisted patient with the execution of Advance Medical Directive. Placed the copy into patient's \"like paper chart. \"  See Flow Sheet for other pastoral interventions. Chaplains will continue to follow and provide pastoral care on an as needed/requested basis.     [address] [facility] Certified [address] [place] [address]   [phone]

## 2017-05-05 NOTE — BRIEF OP NOTE
BRIEF OPERATIVE NOTE    Date of Procedure: 5/5/2017   Preoperative Diagnosis: Gallstones [K80.20]  Postoperative Diagnosis: Gallstones [K80.20]    Procedure(s):  CHOLECYSTECTOMY XI ROBOTIC ASSISTED SINGLE SITE/FIREFLY  Surgeon(s) and Role:     * Marii Vergara MD - Primary         Assistant Staff:       Surgical Staff:  Circ-1: Alyssa Jack  Scrub Tech-1: Donna Villafana  Surg Asst-1: Luis Eduardo Lew  Event Time In   Incision Start 1234   Incision Close      Anesthesia: General   Estimated Blood Loss: minimal  Specimens:   ID Type Source Tests Collected by Time Destination   1 : gallbladder with contents Preservative Gallbladder  Marii Vergara MD 5/5/2017 1258 Pathology      Findings: acute cholecystitis.    Complications: none  Implants: * No implants in log *

## 2017-05-08 ENCOUNTER — PATIENT OUTREACH (OUTPATIENT)
Dept: INTERNAL MEDICINE CLINIC | Age: 57
End: 2017-05-08

## 2017-05-08 NOTE — PROGRESS NOTES
NNTOCIP Contact made: within 2 business days post discharge    Patient admitted to [facility] on 5/5/2017 to 5/5/2017, for scheduled cholecystectomy XI robotic assisted single site/firefly. Patient verified two patient identifiers. Introduced self, role and reason for call. Home medication reconciliation completed and allergies reviewed. Patient presenting symptoms:   Acute and chronic cholecystitis    Patient denies:  SOB  Fever  Chills  Nausea  Vomiting  Abdominal bloating  Constipation   Diarrhea  Numbness  Tingling  Dizziness  Lightheadedness    Patient reports:  Pain 2/10 on a scale of 0 to 10 as tolerable  Dermabond in place  One lap site  Last bowel movement 5/7/2017    Barriers:   Financial: None identified at this time   Patients comprehension of disease: Patient verbalizes understanding of discharge plan and special follow up. Transportation: Spouse    Resources:  Spouse    DME:  None    ADL's:  Patient is independent of all ADL's. Patient reported the following on ADL's:  Feeds self: YES  Ambulates: YES      Self grooming: YES  Toileting: YES    Plan of care:  1. Take medications as prescribed  2. Attend all follow up appointments  3. Monitor for bleeding  4. Monitor for infection    Adherence to previous treatment and likelihood for follow up:  Patient verbalizes understanding of discharge plan and special follow up. Appointments:  5/19/2017 at 1300 with Dr. Jayshree Cosby:  Advance Directive on file    Utilization in the past 12 months:  0 hospitalization and 0 ED    Patient verbalizes understanding self-management of medications, and when to seek medical attention from PCP. Patient was given the opportunity to ask questions. Contact information was provided for future reference or further questions.

## 2017-05-19 ENCOUNTER — OFFICE VISIT (OUTPATIENT)
Dept: SURGERY | Age: 57
End: 2017-05-19

## 2017-05-19 ENCOUNTER — PATIENT OUTREACH (OUTPATIENT)
Dept: INTERNAL MEDICINE CLINIC | Age: 57
End: 2017-05-19

## 2017-05-19 VITALS
TEMPERATURE: 97.7 F | RESPIRATION RATE: 16 BRPM | HEART RATE: 79 BPM | OXYGEN SATURATION: 98 % | DIASTOLIC BLOOD PRESSURE: 72 MMHG | SYSTOLIC BLOOD PRESSURE: 110 MMHG | HEIGHT: 65 IN | WEIGHT: 181 LBS | BODY MASS INDEX: 30.16 KG/M2

## 2017-05-19 DIAGNOSIS — Z09 POSTOPERATIVE EXAMINATION: Primary | ICD-10-CM

## 2017-05-19 NOTE — PROGRESS NOTES
Goals Addressed             Most Recent     COMPLETED: Attends follow-up appointments as directed. On track (5/19/2017)             Patient attended her post procedure follow up appointment with Dr. Malka Frank as scheduled.

## 2017-05-19 NOTE — PROGRESS NOTES
Cielo Mcdonnell is a 64 y.o. female who presents today for a post-op exam for a robotic single site cholecystectomy performed on 05/05/17. Patient scores their post-op pain level on a pain scale from 1-10  as a 1 today. 1. Have you been to the ER, urgent care clinic since your last visit? Hospitalized since your last visit? No    2. Have you seen or consulted any other health care providers outside of the [address] [place] [address] since your last visit? Include any pap smears or colon screening.  No

## 2017-05-19 NOTE — LETTER
5/19/2017 1:06 PM 
 
Patient:  Emily Esposito YOB: 1960 Date of Visit: 5/19/2017 Danna Boone MD 
[address] [place] [address] VIA In Basket Dear Danna Boone MD, Thank you for referring Ms. Gretta Sanchez to [place] [address] for evaluation and treatment. Below are the relevant portions of my assessment and plan of care. Thank you very much for your referral of Ms. Gretta Sanchez. If you have questions, please do not hesitate to call me. I look forward to following Ms. Dominguez along with you and will keep you updated as to her progress. Sincerely, Cleo Drake MD

## 2017-05-19 NOTE — PROGRESS NOTES
Patient seen and examined. Doing well. No pain. Abdomen is soft and non-tender. Wound is healing well. Pathology showed chronic cholecystitis. Follow up as needed.

## 2017-05-30 RX ORDER — PRAMIPEXOLE DIHYDROCHLORIDE 1 MG/1
TABLET ORAL
Qty: 180 TAB | Refills: 1 | Status: SHIPPED | OUTPATIENT
Start: 2017-05-30 | End: 2017-09-26 | Stop reason: SDUPTHER

## 2017-06-07 ENCOUNTER — PATIENT OUTREACH (OUTPATIENT)
Dept: INTERNAL MEDICINE CLINIC | Age: 57
End: 2017-06-07

## 2017-06-07 NOTE — PROGRESS NOTES
Episode closed: no hospitalization or ED admission post 30 days from discharge of 5/5/2017. Goals Addressed             Most Recent     Knowledge and adherence of prescribed medication (ie. action, side effects, missed dose, etc.). On track (6/7/2017)     Understands red flags post discharge.    On track (6/7/2017)

## 2017-06-09 ENCOUNTER — TELEPHONE (OUTPATIENT)
Dept: INTERNAL MEDICINE CLINIC | Age: 57
End: 2017-06-09

## 2017-06-09 NOTE — TELEPHONE ENCOUNTER
Details pls    US Apr showed    IMPRESSION:  1. Echogenic area adjacent to portal flow in the liver, hemangioma or focal  fatty infiltration? Not seen on prior study. Dedicated CT liver protocol  recommended. 2. Cholelithiasis with chronic gallbladder wall thickening. No findings of acute      Then HIDA showed    IMPRESSION:      No visualization of the gallbladder out to 2 hours consistent with either acute  or chronic cholecystitis        Then CT showed    IMPRESSION  IMPRESSION:      No hepatic mass lesion is evident; the ultrasound impression is apparently  secondary to a scanning artifact. .  Cholelithiasis       So the main prob was stones w associated cholecystitis  The US was f/u w CT which showed nothing, ie US was ARTIFACTUAL   What's her question?

## 2017-06-09 NOTE — TELEPHONE ENCOUNTER
Spoke with patient, she doesn't understand why she had the HIDA scan. She said it was not necessary after having the US that showed a 5cm gallstone. I explained that the [facility] report we received did not state she had a 5cm gallstone, it simply that that gallstones were still present when compared to previous [facility] from 2008. She said if she had known that she had a 5cm gallstone then she would have just seen the surgeon without having the HIDA scan. I explained to her that the HIDA was ordered because it was medically the next step to be taking to show the function of her gallbladder. Explained that regardless of gallstone size, the HIDA was the proper next step. Explained that lots of people walk around everyday with gallstones and don't have problems and based on US results, this was the case, nothing had really changed. She said the radiology dept sent us another US report showing that she had a gallstone that was 5cm and that should have been enough to send her to a surgeon. I don't see this \"additional\" report anywhere with the gallstone size and I explained that the HIDA still likely would have been ordered because most general surgeons want an ejection fraction to see how the gallbladder is working. She is upset that she has to pay over $300 for a test she believes wasn't necessary and that her surgeon said wasn't necessary. Dr. Donna Nguyen, she wants you to call and speak with radiology regarding the results they sent stating her gallstone was 5cm.    [facility] [phone], she can get you on the phone with the radiologist.

## 2017-06-13 NOTE — TELEPHONE ENCOUNTER
Pt sent another email and still wants Rd to call the radiologist. I spoke with Dionisio and he said there was no point to call the radiologist, she had her gallbladder removed last month. She said she wants to speak with Dionisio directly, pls call her at [phone], thanks.

## 2017-06-23 NOTE — TELEPHONE ENCOUNTER
Spoke w pt and discussed w her the process regarding our w/u in April. She was told by [facility] that an addended report was sent outlining a 5cm stone, no such report available in [facility]. She agreed that our w/u was appropriate with the info we had available. She was pleased w the outcomes from surgery. Don't know what to say to her as she's upset that she had to pay $300 for the HIDA scan.

## 2017-09-26 RX ORDER — PRAMIPEXOLE DIHYDROCHLORIDE 1 MG/1
TABLET ORAL
Qty: 180 TAB | Refills: 0 | Status: SHIPPED | OUTPATIENT
Start: 2017-09-26 | End: 2017-11-24 | Stop reason: SDUPTHER

## 2017-11-24 RX ORDER — PRAMIPEXOLE DIHYDROCHLORIDE 1 MG/1
TABLET ORAL
Qty: 180 TAB | Refills: 0 | Status: SHIPPED | OUTPATIENT
Start: 2017-11-24 | End: 2017-12-08

## 2017-12-08 ENCOUNTER — HOSPITAL ENCOUNTER (OUTPATIENT)
Dept: GENERAL RADIOLOGY | Age: 57
Discharge: HOME | End: 2017-12-08
Attending: PHYSICIAN ASSISTANT
Payer: COMMERCIAL

## 2017-12-08 ENCOUNTER — HOSPITAL ENCOUNTER (OUTPATIENT)
Dept: CT IMAGING | Age: 57
Discharge: HOME | End: 2017-12-08
Attending: PHYSICIAN ASSISTANT
Payer: COMMERCIAL

## 2017-12-08 ENCOUNTER — HOSPITAL ENCOUNTER (OUTPATIENT)
Dept: LAB | Age: 57
Discharge: HOME | End: 2017-12-08
Payer: COMMERCIAL

## 2017-12-08 ENCOUNTER — OFFICE VISIT (OUTPATIENT)
Dept: INTERNAL MEDICINE CLINIC | Age: 57
End: 2017-12-08

## 2017-12-08 VITALS
OXYGEN SATURATION: 99 % | SYSTOLIC BLOOD PRESSURE: 140 MMHG | RESPIRATION RATE: 14 BRPM | TEMPERATURE: 98.3 F | BODY MASS INDEX: 33.09 KG/M2 | WEIGHT: 198.6 LBS | HEIGHT: 65 IN | DIASTOLIC BLOOD PRESSURE: 90 MMHG | HEART RATE: 101 BPM

## 2017-12-08 DIAGNOSIS — Z01.810 PRE-OPERATIVE CARDIOVASCULAR EXAMINATION: ICD-10-CM

## 2017-12-08 DIAGNOSIS — R10.30 LOWER ABDOMINAL PAIN: Primary | ICD-10-CM

## 2017-12-08 DIAGNOSIS — R10.30 LOWER ABDOMINAL PAIN: ICD-10-CM

## 2017-12-08 LAB
ALBUMIN SERPL-MCNC: 4 G/DL (ref 3.4–5)
ALBUMIN/GLOB SERPL: 1.1 {RATIO} (ref 0.8–1.7)
ALP SERPL-CCNC: 106 U/L (ref 45–117)
ALT SERPL-CCNC: 39 U/L (ref 13–56)
ANION GAP SERPL CALC-SCNC: 8 MMOL/L (ref 3–18)
AST SERPL-CCNC: 32 U/L (ref 15–37)
BASOPHILS # BLD: 0 K/UL (ref 0–0.06)
BASOPHILS NFR BLD: 0 % (ref 0–2)
BILIRUB SERPL-MCNC: 0.5 MG/DL (ref 0.2–1)
BUN SERPL-MCNC: 16 MG/DL (ref 7–18)
BUN/CREAT SERPL: 16 (ref 12–20)
CALCIUM SERPL-MCNC: 9.9 MG/DL (ref 8.5–10.1)
CHLORIDE SERPL-SCNC: 102 MMOL/L (ref 100–108)
CO2 SERPL-SCNC: 27 MMOL/L (ref 21–32)
CREAT SERPL-MCNC: 1.01 MG/DL (ref 0.6–1.3)
DIFFERENTIAL METHOD BLD: NORMAL
EOSINOPHIL # BLD: 0.1 K/UL (ref 0–0.4)
EOSINOPHIL NFR BLD: 1 % (ref 0–5)
ERYTHROCYTE [DISTWIDTH] IN BLOOD BY AUTOMATED COUNT: 13.8 % (ref 11.6–14.5)
GLOBULIN SER CALC-MCNC: 3.8 G/DL (ref 2–4)
GLUCOSE SERPL-MCNC: 119 MG/DL (ref 74–99)
HCT VFR BLD AUTO: 40.6 % (ref 35–45)
HGB BLD-MCNC: 13.5 G/DL (ref 12–16)
LIPASE SERPL-CCNC: 170 U/L (ref 73–393)
LYMPHOCYTES # BLD: 2.3 K/UL (ref 0.9–3.6)
LYMPHOCYTES NFR BLD: 33 % (ref 21–52)
MCH RBC QN AUTO: 30.8 PG (ref 24–34)
MCHC RBC AUTO-ENTMCNC: 33.3 G/DL (ref 31–37)
MCV RBC AUTO: 92.7 FL (ref 74–97)
MONOCYTES # BLD: 0.5 K/UL (ref 0.05–1.2)
MONOCYTES NFR BLD: 8 % (ref 3–10)
NEUTS SEG # BLD: 4.1 K/UL (ref 1.8–8)
NEUTS SEG NFR BLD: 58 % (ref 40–73)
PLATELET # BLD AUTO: 257 K/UL (ref 135–420)
PMV BLD AUTO: 11.3 FL (ref 9.2–11.8)
POTASSIUM SERPL-SCNC: 3.9 MMOL/L (ref 3.5–5.5)
PROT SERPL-MCNC: 7.8 G/DL (ref 6.4–8.2)
RBC # BLD AUTO: 4.38 M/UL (ref 4.2–5.3)
SODIUM SERPL-SCNC: 137 MMOL/L (ref 136–145)
WBC # BLD AUTO: 7 K/UL (ref 4.6–13.2)

## 2017-12-08 PROCEDURE — 36415 COLL VENOUS BLD VENIPUNCTURE: CPT | Performed by: PHYSICIAN ASSISTANT

## 2017-12-08 PROCEDURE — 74011636320 HC RX REV CODE- 636/320: Performed by: PHYSICIAN ASSISTANT

## 2017-12-08 PROCEDURE — 71020 XR CHEST PA LAT: CPT

## 2017-12-08 PROCEDURE — 80053 COMPREHEN METABOLIC PANEL: CPT | Performed by: PHYSICIAN ASSISTANT

## 2017-12-08 PROCEDURE — 83690 ASSAY OF LIPASE: CPT | Performed by: PHYSICIAN ASSISTANT

## 2017-12-08 PROCEDURE — 85025 COMPLETE CBC W/AUTO DIFF WBC: CPT | Performed by: PHYSICIAN ASSISTANT

## 2017-12-08 PROCEDURE — 74177 CT ABD & PELVIS W/CONTRAST: CPT

## 2017-12-08 RX ADMIN — IOPAMIDOL 100 ML: 612 INJECTION, SOLUTION INTRAVENOUS at 19:00

## 2017-12-08 NOTE — PROGRESS NOTES
1. Have you been to the ER, urgent care clinic or hospitalized since your last visit? NO.     2. Have you seen or consulted any other health care providers outside of the [address] [place] [address] since your last visit (Include any pap smears or colon screening)? NO      Do you have an Advanced Directive?  YES

## 2017-12-08 NOTE — MR AVS SNAPSHOT
Visit Information Date & Time Provider Department Dept. Phone Encounter #  
 12/8/2017  9:00 AM Dia Prajapati [facility] [address] [place] [address] [phone] [id number] Your Appointments 3/22/2018  8:35 AM  
LAB with [facility] NURSE VISIT [facility] [address] [place] [address] ([facility]) Appt Note: lab  
 [address], [address] [facility] [address]  
  
   
 [address], Count includes the [facility]  
  
    
 3/29/2018 10:00 AM  
PHYSICAL with Marcus Mancilla MD  
[facility] of [address] [place] [address]) Appt Note: [address]; [address]  
 [address] [address] [address]  
  
   
 [address], Count includes the [facility]  
  
    
 4/5/2018  9:00 AM  
Follow Up with Castro Vidales MD  
[facility] ([facility]) Appt Note: 1 year follow up with an EKG  
 [place] [address] [address] [address] [phone] [address] Upcoming Health Maintenance Date Due Influenza Age 5 to Adult 8/1/2017 BREAST CANCER SCRN MAMMOGRAM 4/3/2019 PAP AKA CERVICAL CYTOLOGY 3/23/2020 COLONOSCOPY 1/14/2021 DTaP/Tdap/Td series (2 - Td) 3/23/2027 Allergies as of 12/8/2017  Review Complete On: 12/8/2017 By: Reg Saldaña Severity Noted Reaction Type Reactions Adhesive Tape-silicones  87/58/9275    Contact Dermatitis Bee Sting [Sting, Bee]    Anaphylaxis Benzoyl Peroxide    Rash Betadine [Povidone-iodine]  02/08/2016    Rash Chlorhexidine Towelette  11/11/2016    Rash Codeine    Nausea and Vomiting Contrave [Naltrexone-bupropion]  06/19/2015    Other (comments) Vision changes Current Immunizations  Reviewed on 8/13/2012 Name Date  
 TD Vaccine 10/5/2010 Tdap 3/23/2017 Zoster Vaccine, Live 11/27/2013 Not reviewed this visit You Were Diagnosed With   
  
 Codes Comments Lower abdominal pain    -  Primary ICD-10-CM: R10.30 ICD-9-CM: 789.09 Vitals BP Pulse Temp Resp Height(growth percentile) Weight(growth percentile) 140/90 (BP 1 Location: Right arm, BP Patient Position: Sitting) (!) 101 98.3 °F (36.8 °C) (Oral) 14 5' 5\" (1.651 m) 198 lb 9.6 oz (90.1 kg) SpO2 BMI OB Status Smoking Status 99% 33.05 kg/m2 Postmenopausal Former Smoker Vitals History BMI and BSA Data Body Mass Index Body Surface Area 33.05 kg/m 2 2.03 m 2 Preferred Pharmacy Pharmacy Name Phone [facility] AT [id number] - [address], [address] [place] [address] [phone] Your Updated Medication List  
  
   
This list is accurate as of: 12/8/17  9:13 AM.  Always use your most recent med list.  
  
  
  
  
 calcium-cholecalciferol (D3) tablet Commonly known as:  CALTRATE 600+D Take 1 Tab by mouth daily. CENTRUM SILVER WOMEN PO Take 1 Tab by mouth daily. EPIPEN 2-PRITI 0.3 mg/0.3 mL injection Generic drug:  EPINEPHrine INJECT 0.3 ML BY INTRAMUSCULAR ROUTE ONCE AS NEEDED FOR UP TO 1 DOSE.  
  
 fish oil-dha-epa 1,200-144-216 mg Cap Take 1 Cap by mouth daily. * MIRAPEX 1 mg tablet Generic drug:  pramipexole Take 2 mg by mouth nightly. * pramipexole 1 mg tablet Commonly known as:  MIRAPEX TAKE 1 TABLET BY MOUTH THREE TIMES DAILY  
  
 oxyCODONE-acetaminophen 5-325 mg per tablet Commonly known as:  PERCOCET Take 1 Tab by mouth every four (4) hours as needed for Pain. Max Daily Amount: 6 Tabs. * Notice: This list has 2 medication(s) that are the same as other medications prescribed for you. Read the directions carefully, and ask your doctor or other care provider to review them with you. To-Do List   
 Around 12/08/2017 Imaging:  CT ABD PELV W WO CONT Referral Information Referral ID Referred By Referred To [id number] [facility] Not Available Visits Status Start Date End Date 1 New Request 12/8/17 12/8/18 If your referral has a status of pending review or denied, additional information will be sent to support the outcome of this decision. Introducing [facility] & HE[facility]! Dear Susana Tolentino: Thank you for requesting a Karma account. Our records indicate that you already have an active Karma account. You can access your account anytime at [email or web address] Did you know that you can access your hospital and ER discharge instructions at any time in Karma? You can also review all of your test results from your hospital stay or ER visit. Additional Information If you have questions, please visit the Frequently Asked Questions section of the Karma website at [email or web address] Remember, Karma is NOT to be used for urgent needs. For medical emergencies, dial 911. Now available from your iPhone and Android! Please provide this summary of care documentation to your next provider. Your primary care clinician is listed as Drinda Epley. If you have any questions after today's visit, please call [phone].

## 2017-12-08 NOTE — PROGRESS NOTES
HPI/History  Blanquita Santos is a 64 y.o.  female who presents for evaluation. Pt reports lower abdominal pain x 5 days. Currently more predominant in periumbilical region and RLQ. Low level dull aching. No NV or fevers. Stools a little looser than normal but no overt diarrhea and no constipation. No urinary sxs or suggestion of urinary stones. Hx of cholecystectomy, appendix intact. Denies hx of diverticulosis or other. No other sxs or complaints. Patient Active Problem List   Diagnosis Code    Hyperlipidemia E78.5    Prediabetes R73.03    Gastroesophageal reflux disease without esophagitis K21.9    DDD (degenerative disc disease), lumbar M51.36    S/P ablation operation for arrhythmia Z98.890, Z86.79    Obesity (BMI 30-39. 9) E66.9    De Quervain's tenosynovitis, left M65.4    Varicose veins of both lower extremities I83.93    Pacemaker Z95.0    Seasonal allergic rhinitis J30.1     Past Medical History:   Diagnosis Date    Arrhythmia     PVC 10% burden; s/p partial ablation Dr Peyton Deal 2/16    Asthma     h/o exercise induced    Cardiac Agatston CAC score, <100 02/29/2016    Coronary calcium score 0.    Cardiac echocardiogram 01/19/2016    EF 55-60%. No RWMA. Indeterminate diastolic fx. RVSP 26 mmHg. Mild MR.  Cardiac Holter monitor study 12/21/2015    Sinus rhythm, avg HR 82 bpm (range  bpm). Numerous PVCs. A few short runs of VT.  Cardiovascular LE venous duplex 07/08/2016    No DVT bilaterally.     Cholelithiasis 2008    on US    Chronic back pain     Dr. Meng Harman    Chronic venous insufficiency 2016    Dr Otto Holguin tenosynovitis     Dr Svetlana Webber Dupuytren's contracture of both hands     Dr Svetlana Webber FHx: heart disease     GERD (gastroesophageal reflux disease) 2/99    on UGI    History of palpitations 2005    Hyperlipidemia     calculated 10 year risk score was 1.8% (12/15); 2.2% (10/16)    Hypovitaminosis D     Myofacial pain     neck and thoracic    OA (osteoarthritis)     lumbar DDD    Obesity     peak weight 200 lbs, bmi 33.3 from 5/13; qsymia ineffective, intol contrave, delcined med supervised wt loss; 24h U petar nl    Osteopenia 2007    t score -1 spine, -0.2 hip Dr. Hull Shadow Pericardial cyst 12/13    calcified on CT    Prediabetes 11/12    Tachycardia     neg eval Dr. Smita Mccormick 2004    Tension headache     Thyroid nodule 8/12    3mm; 5/13, 12/13, 11/14, 1/16 no change    Trigger finger of both hands     Dr Jaren Grider     Past Surgical History:   Procedure Laterality Date    CARDIAC SURG PROCEDURE UNLIST  2004    Holter negative    CARDIAC SURG PROCEDURE UNLIST  2004    echo nl lv, ef 65%    HAND/FINGER SURGERY UNLISTED      HX BUNIONECTOMY  2005    bilateral    HX CARPAL TUNNEL RELEASE  12/08    HX CHOLECYSTECTOMY  05/05/2017    robotic single site cholecystectomy    HX COLONOSCOPY      HBV 2011 negative    HX CYST REMOVAL  2007    right lumbar     HX ORTHOPAEDIC      trigger finger release Dr Magno Lopez    [facility]      heart ablation    HX PACEMAKER  02/28/2017    [employer]    VASCULAR SURGERY PROCEDURE UNLIST  3/12    venous doppler negative     Social History     Social History    Marital status:      Spouse name: N/A    Number of children: 1    Years of education: N/A     Occupational History    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      Social History Main Topics    Smoking status: Former Smoker     Packs/day: 1.00     Years: 20.00     Quit date: 1/14/1993    Smokeless tobacco: Never Used      Comment: 1994    Alcohol use 0.0 oz/week     0 Standard drinks or equivalent per week      Comment: rarely    Drug use: No    Sexual activity: Not on file     Other Topics Concern    Not on file     Social History Narrative     Family History   Problem Relation Age of Onset    Hypertension Mother     Diabetes Mother     Stroke Mother     Arthritis-osteo Mother     Cancer Mother      breast    Elevated Lipids Mother  Diabetes Father     Arthritis-osteo Father     Heart Disease Father     Elevated Lipids Father     Arthritis-osteo Sister     Migraines Sister     Alcohol abuse Brother     Arthritis-osteo Brother      Current Outpatient Prescriptions   Medication Sig    EPIPEN 2-PRITI 0.3 mg/0.3 mL injection INJECT 0.3 ML BY INTRAMUSCULAR ROUTE ONCE AS NEEDED FOR UP TO 1 DOSE.  pramipexole (MIRAPEX) 1 mg tablet Take 2 mg by mouth nightly.  calcium-cholecalciferol, D3, (CALTRATE 600+D) tablet Take 1 Tab by mouth daily.  fish oil-dha-epa 1,200-144-216 mg cap Take 1 Cap by mouth daily.  MULTIVITS-MIN/IRON/FA/LUTEIN (CENTRUM SILVER WOMEN PO) Take 1 Tab by mouth daily. No current facility-administered medications for this visit. Allergies   Allergen Reactions    Adhesive Tape-Silicones Contact Dermatitis    Bee Sting [Sting, Bee] Anaphylaxis    Benzoyl Peroxide Rash    Betadine [Povidone-Iodine] Rash    Chlorhexidine Towelette Rash    Codeine Nausea and Vomiting    Contrave [Naltrexone-Bupropion] Other (comments)     Vision changes       Review of Systems  Aside from those included in HPI, remainder of complete ROS negative. Physical Examination  Visit Vitals    /90 (BP 1 Location: Right arm, BP Patient Position: Sitting)    Pulse (!) 101    Temp 98.3 °F (36.8 °C) (Oral)    Resp 14    Ht 5' 5\" (1.651 m)    Wt 198 lb 9.6 oz (90.1 kg)    SpO2 99%    BMI 33.05 kg/m2       General - Alert and in no acute distress. Pt appears well, comfortable, and in good spirits. Pleasant, engaging. Nontoxic. Not anxious, non-diaphoretic. Mental status - Appropriate mood, behavior, speech content, dress, and thought processes. Pulm - No tachypnea, retractions, or cyanosis. Good respiratory effort. Clear to auscultation bilat. Cardiovascular - Borderline tachycardia, regular rhythm. Abdomen - Obese with few striae. Active bowel sounds. Soft.  Verbalized mild tenderness mostly in RLQ and McBurney, however, no guarding, rigidity, or rebound. Negative Estrada. No appreciable masses. No other findings. Assessment and Plan  1. Lower abdominal pain (low level) now mostly RLQ and periumbilical - Discussed differentials including potential for developing appendicitis but currently doing fairly well. Will check CBC, CMP, and lipase. Will order CT abd/pelv ASAP. She will promptly visit ED if worsening or concerns from any aspect, fevers, or developments as discussed at length. Further planning as warranted. Pt happily agrees with plan. PLEASE NOTE:   This document has been produced using voice recognition software. Unrecognized errors in transcription may be present.     Josias Banda [employer] [facility]  [phone]  12/8/2017

## 2017-12-09 LAB
ATRIAL RATE: 80 BPM
CALCULATED P AXIS, ECG09: 0 DEGREES
CALCULATED R AXIS, ECG10: -10 DEGREES
CALCULATED T AXIS, ECG11: -4 DEGREES
DIAGNOSIS, 93000: NORMAL
P-R INTERVAL, ECG05: 186 MS
Q-T INTERVAL, ECG07: 388 MS
QRS DURATION, ECG06: 102 MS
QTC CALCULATION (BEZET), ECG08: 447 MS
VENTRICULAR RATE, ECG03: 80 BPM

## 2017-12-11 ENCOUNTER — TELEPHONE (OUTPATIENT)
Dept: INTERNAL MEDICINE CLINIC | Age: 57
End: 2017-12-11

## 2017-12-11 NOTE — TELEPHONE ENCOUNTER
CT shows mild (and noncomplicated) diverticulitis. CBC, CMP, and lipase unremarkable. WBC wnl. How is she doing? If still symptomatic will treat with abx. If asymptomatic we can observe.

## 2017-12-14 ENCOUNTER — OFFICE VISIT (OUTPATIENT)
Dept: INTERNAL MEDICINE CLINIC | Age: 57
End: 2017-12-14

## 2017-12-14 VITALS
HEIGHT: 65 IN | SYSTOLIC BLOOD PRESSURE: 110 MMHG | WEIGHT: 197.6 LBS | OXYGEN SATURATION: 98 % | BODY MASS INDEX: 32.92 KG/M2 | TEMPERATURE: 98.6 F | DIASTOLIC BLOOD PRESSURE: 72 MMHG | RESPIRATION RATE: 14 BRPM | HEART RATE: 101 BPM

## 2017-12-14 DIAGNOSIS — R73.03 PREDIABETES: ICD-10-CM

## 2017-12-14 DIAGNOSIS — M19.042 PRIMARY OSTEOARTHRITIS OF BOTH HANDS: ICD-10-CM

## 2017-12-14 DIAGNOSIS — Z01.818 PREOP EXAM FOR INTERNAL MEDICINE: ICD-10-CM

## 2017-12-14 DIAGNOSIS — E78.5 HYPERLIPIDEMIA, UNSPECIFIED HYPERLIPIDEMIA TYPE: Primary | ICD-10-CM

## 2017-12-14 DIAGNOSIS — M19.041 PRIMARY OSTEOARTHRITIS OF BOTH HANDS: ICD-10-CM

## 2017-12-14 DIAGNOSIS — K57.32 DIVERTICULITIS OF LARGE INTESTINE WITHOUT PERFORATION OR ABSCESS WITHOUT BLEEDING: ICD-10-CM

## 2017-12-14 NOTE — PROGRESS NOTES
1. Have you been to the ER, urgent care clinic or hospitalized since your last visit? NO.     2. Have you seen or consulted any other health care providers outside of the [address] [place] [address] since your last visit (Include any pap smears or colon screening)?  NO

## 2017-12-14 NOTE — MR AVS SNAPSHOT
Visit Information Date & Time Provider Department Dept. Phone Encounter #  
 12/14/2017  8:00 AM Blane Suazo MD [facility] [phone] [id number] Your Appointments 3/22/2018  8:35 AM  
LAB with [facility] NURSE VISIT [facility] [place] ([facility]) Appt Note: lab  
 [address], [address] [facility] [address] [place]  
  
   
 [address], [address]  
  
    
 3/29/2018 10:00 AM  
PHYSICAL with Blane Suazo MD  
[facility]) Appt Note: [address]; [address]  
 [address], [address] [address] [address] [place]  
  
   
 [address], [address]  
  
    
 4/5/2018  9:00 AM  
Follow Up with Leandro Fuentes MD  
[facility] ([facility]) Appt Note: 1 year follow up with an EKG  
 [place] [address] [address] [address] [phone] [address] [facility] [address] [facility] [address] Upcoming Health Maintenance Date Due Influenza Age 5 to Adult 8/1/2017 PAP AKA CERVICAL CYTOLOGY 3/23/2020 COLONOSCOPY 1/14/2021 DTaP/Tdap/Td series (2 - Td) 3/23/2027 Allergies as of 12/14/2017  Review Complete On: 12/14/2017 By: Berta Zelaya Severity Noted Reaction Type Reactions Adhesive Tape-silicones  42/20/0239    Contact Dermatitis Bee Sting [Sting, Bee]    Anaphylaxis Benzoyl Peroxide    Rash Betadine [Povidone-iodine]  02/08/2016    Rash Chlorhexidine Towelette  11/11/2016    Rash Codeine    Nausea and Vomiting Contrave [Naltrexone-bupropion]  06/19/2015    Other (comments) Vision changes Current Immunizations  Reviewed on 8/13/2012 Name Date  
 TD Vaccine 10/5/2010 Tdap 3/23/2017 Zoster Vaccine, Live 11/27/2013 Not reviewed this visit Vitals BP Pulse Temp Resp Height(growth percentile) Weight(growth percentile) 110/72 (BP 1 Location: Right arm, BP Patient Position: Sitting) (!) 101 98.6 °F (37 °C) (Oral) 14 5' 5\" (1.651 m) 197 lb 9.6 oz (89.6 kg) SpO2 BMI OB Status Smoking Status 98% 32.88 kg/m2 Postmenopausal Former Smoker Vitals History BMI and BSA Data Body Mass Index Body Surface Area  
 32.88 kg/m 2 2.03 m 2 Preferred Pharmacy Pharmacy Name Phone [facility] AT [facility] - [address], [address] [place] [address] [phone] Your Updated Medication List  
  
   
This list is accurate as of: 12/14/17  8:34 AM.  Always use your most recent med list.  
  
  
  
  
 calcium-cholecalciferol (D3) tablet Commonly known as:  CALTRATE 600+D Take 1 Tab by mouth daily. CENTRUM SILVER WOMEN PO Take 1 Tab by mouth daily. EPIPEN 2-PRITI 0.3 mg/0.3 mL injection Generic drug:  EPINEPHrine INJECT 0.3 ML BY INTRAMUSCULAR ROUTE ONCE AS NEEDED FOR UP TO 1 DOSE.  
  
 fish oil-dha-epa 1,200-144-216 mg Cap Take 1 Cap by mouth daily. MIRAPEX 1 mg tablet Generic drug:  pramipexole Take 2 mg by mouth nightly. Introducing [facility] & HE[facility] SERVICES! Dear Judy Jimenez: Thank you for requesting a HeatG[employer] account. Our records indicate that you already have an active HeatGear account. You can access your account anytime at [email or web address] Did you know that you can access your hospital and ER discharge instructions at any time in HeatGear? You can also review all of your test results from your hospital stay or ER visit. Additional Information If you have questions, please visit the Frequently Asked Questions section of the HeatGear website at [email or web address] Remember, HeatGear is NOT to be used for urgent needs. For medical emergencies, dial 911. Now available from your iPhone and Android! Please provide this summary of care documentation to your next provider. Your primary care clinician is listed as Kristofer Rahman. If you have any questions after today's visit, please call [phone].

## 2017-12-14 NOTE — PROGRESS NOTES
62 y.o. WHITE OR  female who presents for medical clearance    She will be undergoing left thumb cmc arthroplasty by Dr Levy Chong on 12/19/17    She had placement of the pacemaker and no more syncopal episodes. No other cardiovascular complaints. Trying to exercise but she hurt her toe recently and just recovering from that. She was seeing podiatry    No  complaints. She was dx'ed w diverticulitis recently but seems to be recovering from that and her sx have resolved for the most part. Dr Gabriele Tong did not recommend any procedures for the swelling which was actually resolved fully now    Past Medical History:   Diagnosis Date    Arrhythmia     PVC 10% burden; s/p partial ablation Dr Madhuri Campbell 2/16    Asthma     h/o exercise induced    Cardiac Agatston CAC score, <100 02/29/2016    Coronary calcium score 0.    Cardiac echocardiogram 01/19/2016    EF 55-60%. No RWMA. Indeterminate diastolic fx. RVSP 26 mmHg. Mild MR.  Cardiac Holter monitor study 12/21/2015    Sinus rhythm, avg HR 82 bpm (range  bpm). Numerous PVCs. A few short runs of VT.  Cardiovascular LE venous duplex 07/08/2016    No DVT bilaterally.     Cholelithiasis 2008    on US    Chronic back pain     Dr. Kimberley Rodriguez    Chronic venous insufficiency 2016    Dr Sapna Valencia tenosynovitis     Dr Rosalina Dodson    Diverticulitis 12/2017    CT proven    Dupuytren's contracture of both hands     Dr Kvng Ivy FHx: heart disease     GERD (gastroesophageal reflux disease) 2/99    on UGI    History of palpitations 2005    Hyperlipidemia     calculated 10 year risk score was 1.8% (12/15); 2.2% (10/16)    Hypovitaminosis D     Myofacial pain     neck and thoracic    OA (osteoarthritis)     lumbar DDD    Obesity     peak weight 200 lbs, bmi 33.3 from 5/13; qsymia ineffective, intol contrave, delcined med supervised wt loss; 24h U petar nl    Osteopenia 2007    t score -1 spine, -0.2 hip Dr. Kranthi Velazquez Pericardial cyst 12/13    calcified on CT    Prediabetes 11/12    Tachycardia     neg eval Dr. Roma Tirado 2004    Tension headache     Thyroid nodule 8/12    3mm; 5/13, 12/13, 11/14, 1/16 no change    Trigger finger of both hands     Dr Jovanna Leach     Past Surgical History:   Procedure Laterality Date    CARDIAC SURG PROCEDURE UNLIST  2004    Holter negative    CARDIAC SURG PROCEDURE UNLIST  2004    echo nl lv, ef 65%    HAND/FINGER SURGERY UNLISTED      HX BUNIONECTOMY  2005    bilateral    HX CARPAL TUNNEL RELEASE  12/08    HX CHOLECYSTECTOMY  05/05/2017    robotic single site cholecystectomy Dr Lashonda Kong HX COLONOSCOPY      [facility] 2011 negative    HX CYST REMOVAL  2007    right lumbar     HX ORTHOPAEDIC      trigger finger release Dr Lorriane Blizzard    [facility]      heart ablation    HX PACEMAKER  02/28/2017    [employer]    VASCULAR SURGERY PROCEDURE UNLIST  3/12    venous doppler negative     Social History     Social History    Marital status:      Spouse name: N/A    Number of children: 1    Years of education: N/A     Occupational History    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      Social History Main Topics    Smoking status: Former Smoker     Packs/day: 1.00     Years: 20.00     Quit date: 1/14/1993    Smokeless tobacco: Never Used      Comment: 1994    Alcohol use 0.0 oz/week     0 Standard drinks or equivalent per week      Comment: rarely    Drug use: No    Sexual activity: Not on file     Other Topics Concern    Not on file     Social History Narrative     Current Outpatient Prescriptions   Medication Sig    EPIPEN 2-PRITI 0.3 mg/0.3 mL injection INJECT 0.3 ML BY INTRAMUSCULAR ROUTE ONCE AS NEEDED FOR UP TO 1 DOSE.  pramipexole (MIRAPEX) 1 mg tablet Take 2 mg by mouth nightly.  calcium-cholecalciferol, D3, (CALTRATE 600+D) tablet Take 1 Tab by mouth daily.  fish oil-dha-epa 1,200-144-216 mg cap Take 1 Cap by mouth daily.  MULTIVITS-MIN/IRON/FA/LUTEIN (CENTRUM SILVER WOMEN PO) Take 1 Tab by mouth daily. No current facility-administered medications for this visit. Allergies   Allergen Reactions    Adhesive Tape-Silicones Contact Dermatitis    Bee Sting [Sting, Bee] Anaphylaxis    Benzoyl Peroxide Rash    Betadine [Povidone-Iodine] Rash    Chlorhexidine Towelette Rash    Codeine Nausea and Vomiting    Contrave [Naltrexone-Bupropion] Other (comments)     Vision changes     REVIEW OF SYSTEMS: mammo 4/17, gyn 3/17, colo 2011 [facility], DEXA 12/15  Ophtho  no vision change or eye pain  Oral  no mouth pain, tongue or tooth problems  Ears  no hearing loss, ear pain, fullness, no swallowing problems  Cardiac  no CP, PND, orthopnea  Chest  no breast masses  Resp  no wheezing, chronic coughing, dyspnea  GI  no heartburn, nausea, vomiting, change in bowel habits, bleeding, hemorrhoids  Urinary  no dysuria, hematuria, flank pain, urgency, frequency  Genitals  no genital lesions, discharge, masses, ulceration, warts  Constitutional  no wt loss, night sweats, unexplained fevers    Visit Vitals    /72 (BP 1 Location: Right arm, BP Patient Position: Sitting)    Pulse (!) 101    Temp 98.6 °F (37 °C) (Oral)    Resp 14    Ht 5' 5\" (1.651 m)    Wt 197 lb 9.6 oz (89.6 kg)    SpO2 98%    BMI 32.88 kg/m2   Affect is appropriate. Mood stable  No apparent distress  HEENT --Anicteric sclerae, tympanic membranes normal,  ear canals normal.  PERRL, EOMI, conjunctiva and lids normal.  Disks were sharp  Sinuses were nontender, turbinates normal, hearing normal.  Oropharynx without  erythema, normal tongue, oral mucosa and tonsils. No thyromegaly, JVD, or bruits. Lungs --Clear to auscultation, normal percussion. Heart --Regular rate and rhythm, no murmurs, rubs, gallops, or clicks. Breasts -- no masses, skin dimpling, asymmetry, nipple discharge, nodules, axillary adenopathy   Chest wall --Nontender to palpation. PMI normal.  Abdomen -- Soft and nontender, no hepatosplenomegaly or masses.   Ext without c/c/e    LABS  From 3/12 showed   gluc 120, cr 0.72, gfr 97,                                                                                   wbc 6.3, hb 13.0, plt 230, tsh 0.69, lyme neg, ua neg  From 11/12 showed gluc 105, cr 1.04, gfr 62,     hba1c 6.0,              chol 246, tg 306, hdl 61, ldl-c 124,                 tsh 1.21  From 5/13 showed                            chol 302. tg 281, hdl 60, ldl-c 186  From 11/13 showed gluc 105, cr 0.95, gfr 69,    hba1c 6.1, ldl-p 903, chol 247, tg 134, hdl 74, ldl-c 146  From 11/13 showed gluc 80,   cr 0.92, gfr 72,  alt 15,               chol 146, tg 85,   hdl 78, ldl-c 51,   wbc 5.9, hb 13.9, plt 238  From 11/14 showed gluc 96,   cr 0.92, gfr>60, alt<5,  hba1c 6.1,   chol 233, tg 174, hdl 75, ldl-c 123, wbc 5.7, hb 13.3, plt 204,           ua neg  From 2/15 showed   gluc 94,   cr 0.98, gfr 59,  alt 7,   hba1c 5.8,              wbc 7.6, hb 13.7, plt 228, tsh 0.62,          ua neg  From 6/15 showed        hba1c 5.8  From 12/15 showed gluc 96,   cr 0.94, gfr>60, alt 31,    chol 274, tg 209, hdl 91, ldl-c 141  From 6/16 showed   gluc 102, cr 0.89, gfr>60, alt 21, hba1c 6.1,   chol 319, tg 314, hdl 67, ldl-c 189, wbc 6.3, hb 12.5, plt 188, tsh 1.49, ft4 1.1,     tt3 86, tpo ab neg  From 7/16 showed   gluc 102, cr 1.44, gfr 38,              24h U petra 18  From 10/16 showed gluc 103, cr 1.05, gfr 54,  alt 23, hba1c 5.4,   chol 292, tg 292, hdl 67, ldl-c 167    PREOP  From 12/17 showed gluc 119, cr 1.01, gfr 57, alt 33, wbc 7.0, hb 13.5, plt 257, lip 170  EKG showed nsr, rate 80, IRBBB, non spec t wave change, no change from 3/17  CXR showed EMMANUELLE    Patient Active Problem List   Diagnosis Code    Hyperlipidemia E78.5    Prediabetes R73.03    Gastroesophageal reflux disease without esophagitis K21.9    DDD (degenerative disc disease), lumbar M51.36    S/P ablation operation for arrhythmia Z98.890, Z86.79    Obesity (BMI 30-39. 9) E66.9    De Quervain's tenosynovitis, left M65.4    Varicose veins of both lower extremities I83.93    Pacemaker Z95.0    Seasonal allergic rhinitis J30.1     Assessment and plan:  1. Ortho. she is felt to be average risk for the planned procedure, no contraindications noted. protocol  2. Obesity. Declined med supervised wt loss. 3. GERD. PPI tx and avoidance measures prn  4. Osteopenia. Ca/d, weight bearing exercise as tolerated  5. Hyperlipidemia. Lifestyle and dietary measures  6. H/O thyroid nodule. Stable nodule on f/u.   7. Prediabetes. Continue current approaches and attempts at wt loss  8. S/P ablation and pacemaker placement. F/U Dr Juanjose Covarrubias  9. Recent CT proven diverticulosis. Will try to get colo done in the upcoming months to f/u        RTC 3/18    Above conditions discussed at length and patient vocalized understanding.   All questions answered to patient satifaction

## 2017-12-14 NOTE — PROGRESS NOTES
Nurse-  pls call pt  With the recent diverticulitis, standard of care os to get f/u colo done within the next 2-3 months, referral sent to Dr Alexey Bates - pls fax note to dr Omalley Bowels [facility]

## 2017-12-18 ENCOUNTER — TELEPHONE (OUTPATIENT)
Dept: INTERNAL MEDICINE CLINIC | Age: 57
End: 2017-12-18

## 2017-12-18 NOTE — TELEPHONE ENCOUNTER
Meg Berger MD at 12/14/17 0800        Status: Signed            Nurse-  pls call pt  With the recent diverticulitis, standard of care os to get f/u colo done within the next 2-3 months, referral sent to Dr Jess De La Cruz [facility]           LMTCB to give her message above

## 2017-12-20 ENCOUNTER — OFFICE VISIT (OUTPATIENT)
Dept: CARDIOLOGY CLINIC | Age: 57
End: 2017-12-20

## 2017-12-20 DIAGNOSIS — I49.5 SINUS NODE DYSFUNCTION (HCC): ICD-10-CM

## 2017-12-20 DIAGNOSIS — Z95.0 PACEMAKER: Primary | ICD-10-CM

## 2017-12-22 NOTE — PROGRESS NOTES
I have personally seen and evaluated the device findings. Interrogation reviewed and I agree with assessment.     Andrew Carmona

## 2018-01-24 RX ORDER — PRAMIPEXOLE DIHYDROCHLORIDE 1 MG/1
TABLET ORAL
Qty: 180 TAB | Refills: 0 | Status: SHIPPED | OUTPATIENT
Start: 2018-01-24 | End: 2018-03-29 | Stop reason: SDUPTHER

## 2018-03-02 ENCOUNTER — ANESTHESIA EVENT (OUTPATIENT)
Dept: ENDOSCOPY | Age: 58
End: 2018-03-02
Payer: COMMERCIAL

## 2018-03-05 ENCOUNTER — ANESTHESIA (OUTPATIENT)
Dept: ENDOSCOPY | Age: 58
End: 2018-03-05
Payer: COMMERCIAL

## 2018-03-05 ENCOUNTER — HOSPITAL ENCOUNTER (OUTPATIENT)
Age: 58
Setting detail: OUTPATIENT SURGERY
Discharge: HOME | End: 2018-03-05
Attending: INTERNAL MEDICINE | Admitting: INTERNAL MEDICINE
Payer: COMMERCIAL

## 2018-03-05 VITALS
HEART RATE: 70 BPM | WEIGHT: 198 LBS | TEMPERATURE: 98.2 F | HEIGHT: 65 IN | OXYGEN SATURATION: 100 % | BODY MASS INDEX: 32.99 KG/M2 | RESPIRATION RATE: 18 BRPM | DIASTOLIC BLOOD PRESSURE: 47 MMHG | SYSTOLIC BLOOD PRESSURE: 87 MMHG

## 2018-03-05 PROCEDURE — 76060000032 HC ANESTHESIA 0.5 TO 1 HR: Performed by: INTERNAL MEDICINE

## 2018-03-05 PROCEDURE — 77030011640 HC PAD GRND REM COVD -A: Performed by: INTERNAL MEDICINE

## 2018-03-05 PROCEDURE — 74011250636 HC RX REV CODE- 250/636: Performed by: NURSE ANESTHETIST, CERTIFIED REGISTERED

## 2018-03-05 PROCEDURE — 76040000007: Performed by: INTERNAL MEDICINE

## 2018-03-05 PROCEDURE — 74011250636 HC RX REV CODE- 250/636

## 2018-03-05 PROCEDURE — 77030034690 HC DEV ENDO SNR RETRV STRC -B: Performed by: INTERNAL MEDICINE

## 2018-03-05 PROCEDURE — 74011000250 HC RX REV CODE- 250

## 2018-03-05 PROCEDURE — 88305 TISSUE EXAM BY PATHOLOGIST: CPT | Performed by: INTERNAL MEDICINE

## 2018-03-05 PROCEDURE — 77030003657 HC NDL SCLER BSC -B: Performed by: INTERNAL MEDICINE

## 2018-03-05 PROCEDURE — 77030013992 HC SNR POLYP ENDOSC BSC -B: Performed by: INTERNAL MEDICINE

## 2018-03-05 PROCEDURE — 74011250637 HC RX REV CODE- 250/637: Performed by: INTERNAL MEDICINE

## 2018-03-05 RX ORDER — NALOXONE HYDROCHLORIDE 0.4 MG/ML
0.4 INJECTION, SOLUTION INTRAMUSCULAR; INTRAVENOUS; SUBCUTANEOUS
Status: CANCELLED | OUTPATIENT
Start: 2018-03-05 | End: 2018-03-05

## 2018-03-05 RX ORDER — SODIUM CHLORIDE 0.9 % (FLUSH) 0.9 %
5-10 SYRINGE (ML) INJECTION EVERY 8 HOURS
Status: DISCONTINUED | OUTPATIENT
Start: 2018-03-05 | End: 2018-03-05 | Stop reason: HOSPADM

## 2018-03-05 RX ORDER — ATROPINE SULFATE 0.1 MG/ML
0.5 INJECTION INTRAVENOUS
Status: CANCELLED | OUTPATIENT
Start: 2018-03-05 | End: 2018-03-05

## 2018-03-05 RX ORDER — SODIUM CHLORIDE 0.9 % (FLUSH) 0.9 %
5-10 SYRINGE (ML) INJECTION EVERY 8 HOURS
Status: CANCELLED | OUTPATIENT
Start: 2018-03-05 | End: 2018-03-05

## 2018-03-05 RX ORDER — DEXTROMETHORPHAN/PSEUDOEPHED 2.5-7.5/.8
DROPS ORAL AS NEEDED
Status: DISCONTINUED | OUTPATIENT
Start: 2018-03-05 | End: 2018-03-05 | Stop reason: HOSPADM

## 2018-03-05 RX ORDER — SODIUM CHLORIDE 0.9 % (FLUSH) 0.9 %
5-10 SYRINGE (ML) INJECTION AS NEEDED
Status: CANCELLED | OUTPATIENT
Start: 2018-03-05 | End: 2018-03-05

## 2018-03-05 RX ORDER — SODIUM CHLORIDE 0.9 % (FLUSH) 0.9 %
5-10 SYRINGE (ML) INJECTION AS NEEDED
Status: DISCONTINUED | OUTPATIENT
Start: 2018-03-05 | End: 2018-03-05 | Stop reason: HOSPADM

## 2018-03-05 RX ORDER — DEXTROMETHORPHAN/PSEUDOEPHED 2.5-7.5/.8
1.2 DROPS ORAL
Status: CANCELLED | OUTPATIENT
Start: 2018-03-05

## 2018-03-05 RX ORDER — LIDOCAINE HYDROCHLORIDE 20 MG/ML
INJECTION, SOLUTION EPIDURAL; INFILTRATION; INTRACAUDAL; PERINEURAL AS NEEDED
Status: DISCONTINUED | OUTPATIENT
Start: 2018-03-05 | End: 2018-03-05 | Stop reason: HOSPADM

## 2018-03-05 RX ORDER — EPINEPHRINE 0.1 MG/ML
1 INJECTION INTRACARDIAC; INTRAVENOUS
Status: CANCELLED | OUTPATIENT
Start: 2018-03-05 | End: 2018-03-05

## 2018-03-05 RX ORDER — FLUMAZENIL 0.1 MG/ML
0.2 INJECTION INTRAVENOUS
Status: CANCELLED | OUTPATIENT
Start: 2018-03-05 | End: 2018-03-05

## 2018-03-05 RX ORDER — PROPOFOL 10 MG/ML
INJECTION, EMULSION INTRAVENOUS AS NEEDED
Status: DISCONTINUED | OUTPATIENT
Start: 2018-03-05 | End: 2018-03-05 | Stop reason: HOSPADM

## 2018-03-05 RX ORDER — SODIUM CHLORIDE, SODIUM LACTATE, POTASSIUM CHLORIDE, CALCIUM CHLORIDE 600; 310; 30; 20 MG/100ML; MG/100ML; MG/100ML; MG/100ML
75 INJECTION, SOLUTION INTRAVENOUS CONTINUOUS
Status: DISCONTINUED | OUTPATIENT
Start: 2018-03-05 | End: 2018-03-05 | Stop reason: HOSPADM

## 2018-03-05 RX ORDER — DEXTROMETHORPHAN/PSEUDOEPHED 2.5-7.5/.8
DROPS ORAL
Status: DISCONTINUED
Start: 2018-03-05 | End: 2018-03-05 | Stop reason: HOSPADM

## 2018-03-05 RX ADMIN — PROPOFOL 50 MG: 10 INJECTION, EMULSION INTRAVENOUS at 09:11

## 2018-03-05 RX ADMIN — PROPOFOL 50 MG: 10 INJECTION, EMULSION INTRAVENOUS at 09:04

## 2018-03-05 RX ADMIN — PROPOFOL 100 MG: 10 INJECTION, EMULSION INTRAVENOUS at 08:53

## 2018-03-05 RX ADMIN — PROPOFOL 50 MG: 10 INJECTION, EMULSION INTRAVENOUS at 08:56

## 2018-03-05 RX ADMIN — LIDOCAINE HYDROCHLORIDE 40 MG: 20 INJECTION, SOLUTION EPIDURAL; INFILTRATION; INTRACAUDAL; PERINEURAL at 08:53

## 2018-03-05 RX ADMIN — PROPOFOL 50 MG: 10 INJECTION, EMULSION INTRAVENOUS at 09:00

## 2018-03-05 NOTE — ANESTHESIA POSTPROCEDURE EVALUATION
Post-Anesthesia Evaluation and Assessment    Patient: Doug Brock MRN: [id number]  SSN: [id number]    YOB: 1960  Age: 62 y.o. Sex: female       Cardiovascular Function/Vital Signs  Visit Vitals    BP (!) 87/47    Pulse 70    Temp 36.8 °C (98.2 °F)    Resp 18    Ht 5' 5\" (1.651 m)    Wt 89.8 kg (198 lb)    SpO2 100%    Breastfeeding No    BMI 32.95 kg/m2       Patient is status post MAC anesthesia for Procedure(s):  COLONOSCOPY w/ injection w/ polypectomy. Nausea/Vomiting: None    Postoperative hydration reviewed and adequate. Pain:  Pain Scale 1: Numeric (0 - 10) (03/05/18 1000)  Pain Intensity 1: 0 (03/05/18 1000)   Managed    Neurological Status: At baseline    Mental Status and Level of Consciousness: Arousable    Pulmonary Status:   O2 Device: Room air (03/05/18 1000)   Adequate oxygenation and airway patent    Complications related to anesthesia: None    Post-anesthesia assessment completed.  No concerns        Signed By: Kem Perez MD     March 5, 2018

## 2018-03-05 NOTE — IP AVS SNAPSHOT
[address] [facility] [address] [facility] [address] 
[phone] Patient: Padmini Corona MRN: [id number] :1960 About your hospitalization You were admitted on:  2018 You last received care in the:  [facility] END[facility]COPY You were discharged on:  2018 Why you were hospitalized Your primary diagnosis was:  Not on File Follow-up Information Follow up With Details Comments Contact Info Masha Hernandez, [address] [facility] [address] 
[phone] Your Scheduled Appointments   8:35 AM EDT  
LAB with [facility] NURSE VISIT [facility] [address] [place] [address] ([address] [place] [address]) [address] [place] [address] [address] [facility]  
[phone]  10:00 AM EDT PHYSICAL with Lico Chery MD  
[facility] [address] [place] [address]) [address] [place] [address] [address] [facility]  
[phone]   9:00 AM EDT Follow Up with Dylon Yanez MD  
[facility] ([address] [place] [address]) [facility] [address] [phone] Discharge Orders None A check mohan indicates which time of day the medication should be taken. My Medications ASK your doctor about these medications Instructions Each Dose to Equal  
 Morning Noon Evening Bedtime  
 calcium-cholecalciferol (D3) tablet Commonly known as:  CALTRATE 600+D Your last dose was: Your next dose is: Take 1 Tab by mouth daily. 1 Tab CENTRUM SILVER WOMEN PO Your last dose was: Your next dose is: Take 1 Tab by mouth daily. 1 Tab  
    
   
   
   
  
 EPIPEN 2-PRITI 0.3 mg/0.3 mL injection Generic drug:  EPINEPHrine Your last dose was: Your next dose is: INJECT 0.3 ML BY INTRAMUSCULAR ROUTE ONCE AS NEEDED FOR UP TO 1 DOSE.  
     
   
   
   
  
 fish oil-dha-epa 1,200-144-216 mg Cap Your last dose was: Your next dose is: Take 1 Cap by mouth daily. 1 Cap  
    
   
   
   
  
 pramipexole 1 mg tablet Commonly known as:  MIRAPEX Your last dose was: Your next dose is: TAKE 1 TABLET BY MOUTH THREE TIMES DAILY Discharge Instructions DISCHARGE SUMMARY from Nurse POST-PROCEDURE INSTRUCTIONS: 
 
Call your Physician if you: 
? Observe any excess bleeding. ? Develop a temperature over 100.5o F. 
? Experience abdominal, shoulder or chest pain. ? Notice any signs of decreased circulation or nerve impairment to an extremity such as a change in color, persistent numbness, tingling, coldness or increase in pain. ? Vomit blood or you have nausea and vomiting lasting longer than 4 hours. ? Are unable to take medications. ? Are unable to urinate within 8 hours after discharge following general anesthesia or intravenous sedation. For the next 24 hours after receiving general anesthesia or intravenous sedation, or while taking prescription Narcotics, limit your activities: 
? Do NOT drive a motor vehicle, operate hazard machinery or power tools, or perform tasks that require coordination. The medication you received during your procedure may have some effect on your mental awareness. ? Do NOT make important personal or business decisions. The medication you received during your procedure may have some effect on your mental awareness. ? Do NOT drink alcoholic beverages. These drinks do not mix well with the medications that have been given to you. ? Upon discharge from the hospital, you must be accompanied by a responsible adult. ? Resume your diet as directed by your physician. ? Resume medications as your physician has prescribed. ? Please give a list of your current medications to your Primary Care Provider. ? Please update this list whenever your medications are discontinued, doses are changed, or new medications (including over-the-counter products) are added. ? Please carry medication information at all times in case of emergency situations. These are general instructions for a healthy lifestyle: No smoking/ No tobacco products/ Avoid exposure to second hand smoke. ? Surgeon General's Warning:  Quitting smoking now greatly reduces serious risk to your health. Obesity, smoking, and a sedentary lifestyle greatly increase your risk for illness. ? A healthy diet, regular physical exercise & weight monitoring are important for maintaining a healthy lifestyle ? You may be retaining fluid if you have a history of heart failure or if you experience any of the following symptoms:  Weight gain of 3 pounds or more overnight or 5 pounds in a week, increased swelling in our hands or feet or shortness of breath while lying flat in bed. Please call your doctor as soon as you notice any of these symptoms; do not wait until your next office visit. Recognize signs and symptoms of STROKE: 
F  -  Face looks uneven A  -  Arms unable to move or move unevenly S  -  Speech slurred or non-existent T  -  Time to call 911 - as soon as signs and symptoms begin - DO NOT go back to bed or wait to see If you get better - TIME IS BRAIN. Colorectal Screening ? Colorectal cancer almost always develops from precancerous polyps (abnormal growths) in the colon or rectum. Screening tests can find precancerous polyps, so that they can be removed before they turn into cancer. Screening tests can also find colorectal cancer early, when treatment works best. 
? Speak with your physician about when you should begin screening and how often you should be tested. Colonoscopy: What to Expect at [facility] Your Recovery After you have a colonoscopy, you will stay at the clinic for 1 to 2 hours until the medicines wear off. Then you can go home. But you will need to arrange for a ride. Your doctor will tell you when you can eat and do your other usual activities. Your doctor will talk to you about when you will need your next colonoscopy. Your doctor can help you decide how often you need to be checked. This will depend on the results of your test and your risk for colorectal cancer. After the test, you may be bloated or have gas pains. You may need to pass gas. If a biopsy was done or a polyp was removed, you may have streaks of blood in your stool (feces) for a few days. This care sheet gives you a general idea about how long it will take for you to recover. But each person recovers at a different pace. Follow the steps below to get better as quickly as possible. How can you care for yourself at home? Activity · Rest when you feel tired. · You can do your normal activities when it feels okay to do so. Diet · Follow your doctor's directions for eating. · Unless your doctor has told you not to, drink plenty of fluids. This helps to replace the fluids that were lost during the colon prep. · Do not drink alcohol. Medicines · If polyps were removed or a biopsy was done during the test, your doctor may tell you not to take aspirin or other anti-inflammatory medicines for a few days. These include ibuprofen (Advil, Motrin) and naproxen (Aleve). Other instructions · For your safety, do not drive or operate machinery until the medicine wears off and you can think clearly. Your doctor may tell you not to drive or operate machinery until the day after your test. 
· Do not sign legal documents or make major decisions until the medicine wears off and you can think clearly. The anesthesia can make it hard for you to fully understand what you are agreeing to. Follow-up care is a key part of your treatment and safety. Be sure to make and go to all appointments, and call your doctor if you are having problems. It's also a good idea to know your test results and keep a list of the medicines you take. When should you call for help? Call 911 anytime you think you may need emergency care. For example, call if: 
· You passed out (lost consciousness). · You pass maroon or bloody stools. · You have severe belly pain. Call your doctor now or seek immediate medical care if: 
· Your stools are black and tarlike. · Your stools have streaks of blood, but you did not have a biopsy or any polyps removed. · You have belly pain, or your belly is swollen and firm. · You vomit. · You have a fever. · You are very dizzy. Watch closely for changes in your health, and be sure to contact your doctor if you have any problems. Where can you learn more? Go to "[employer]be Enter E264 in the search box to learn more about \"Colonoscopy: What to Expect at Home. \"  
© 0713-3554 Healthwise, Incorporated. Care instructions adapted under license by Imtiza Adkins (which disclaims liability or warranty for this information). This care instruction is for use with your licensed healthcare professional. If you have questions about a medical condition or this instruction, always ask your healthcare professional. [address] any warranty or liability for your use of this information. Content Version: 37.6.010775; Current as of: November 14, 2014 Introducing \[facility] [place] [facility]\"" & HEALTH SERVICES! Dear Endy Nicely: Thank you for requesting a [employer] account. Our records indicate that you already have an active [employer] account. You can access your account anytime at [email or web address] Did you know that you can access your hospital and ER discharge instructions at any time in Mysportsbrandshart? You can also review all of your test results from your hospital stay or ER visit. Additional Information If you have questions, please visit the Frequently Asked Questions section of the [employer] website at [email or web address] Remember, MyChart is NOT to be used for urgent needs. For medical emergencies, dial 911. Now available from your iPhone and Android! Providers Seen During Your Hospitalization Provider Specialty Primary office phone Jose J Felder MD Gastroenterology [phone] Your Primary Care Physician (PCP) Primary Care Physician Office Phone Office Fax Jm Sheffield [phone] You are allergic to the following Allergen Reactions Adhesive Tape-Silicones Contact Dermatitis Bee Sting (Sting, Bee) Anaphylaxis Benzoyl Peroxide Rash Betadine (Povidone-Iodine) Rash Chlorhexidine Towelette Rash Codeine Nausea and Vomiting Contrave (Naltrexone-Bupropion) Other (comments) Vision changes Recent Documentation Height Weight Breastfeeding? BMI OB Status Smoking Status 1.651 m 89.8 kg No 32.95 kg/m2 Postmenopausal Former Smoker Emergency Contacts Name Discharge Info Relation Home Work Mobile [address] CAREGIVER [3] Spouse [3] [phone] Patient Belongings The following personal items are in your possession at time of discharge: 
  Dental Appliances: None  Visual Aid: Glasses, With patient Please provide this summary of care documentation to your next provider. Signatures-by signing, you are acknowledging that this After Visit Summary has been reviewed with you and you have received a copy. Patient Signature:  ____________________________________________________________ Date:  ____________________________________________________________  
  
Hernan Phenes Provider Signature:  ____________________________________________________________ Date:  ____________________________________________________________

## 2018-03-05 NOTE — ANESTHESIA PREPROCEDURE EVALUATION
Anesthetic History   No history of anesthetic complications            Review of Systems / Medical History  Patient summary reviewed and pertinent labs reviewed    Pulmonary            Asthma : well controlled       Neuro/Psych   Within defined limits          Comments: H/o syncope Cardiovascular      Valvular problems/murmurs: tricuspid insufficiency      Dysrhythmias : SVT and PVC  Pacemaker and hyperlipidemia    Exercise tolerance: >4 METS  Comments: S/p ablation and pacer   GI/Hepatic/Renal     GERD: well controlled           Endo/Other      Hypothyroidism: well controlled  Obesity and arthritis     Other Findings   Comments: Documentation of current medication  Current medications obtained, documented and obtained? YES      Risk Factors for Postoperative nausea/vomiting:       History of postoperative nausea/vomiting? NO       Female? YES       Motion sickness? NO       Intended opioid administration for postoperative analgesia? NO      Smoking Abstinence:  Current Smoker? NO  Elective Surgery? YES  Seen preoperatively by anesthesiologist or proxy prior to day of surgery? YES  Pt abstained from smoking 24 hours prior to anesthesia?  YES    Preventive care/screening for High Blood Pressure:  Aged 18 years and older: YES  Screened for high blood pressure: YES  Patients with high blood pressure referred to primary care provider   for BP management: YES                 Physical Exam    Airway  Mallampati: II  TM Distance: > 6 cm  Neck ROM: normal range of motion   Mouth opening: Normal     Cardiovascular  Regular rate and rhythm,  S1 and S2 normal,  no murmur, click, rub, or gallop             Dental  No notable dental hx       Pulmonary  Breath sounds clear to auscultation               Abdominal  GI exam deferred       Other Findings            Anesthetic Plan    ASA: 3  Anesthesia type: MAC          Induction: Intravenous  Anesthetic plan and risks discussed with: Patient

## 2018-03-05 NOTE — H&P
[email or web address]  [phone]    GASTROENTEROLOGY Pre-Procedure H and P      Impression/Plan:   1. This patient is consented for a colonoscopy for recent dx of diverticulitis. Chief Complaint: h/o diverticulitis      HPI:  Karan Hassan is a 62 y.o. female who is being is having a colonoscopy for recent dx of diverticulitis. Last colo in 2011. No fam hx. PMH:   Past Medical History:   Diagnosis Date    Arrhythmia     PVC 10% burden; s/p partial ablation Dr Carmelita Moon 2/16    Asthma     h/o exercise induced, denies 3/2/18    Cardiac Agatston CAC score, <100 02/29/2016    Coronary calcium score 0.    Cardiac echocardiogram 01/19/2016    EF 55-60%. No RWMA. Indeterminate diastolic fx. RVSP 26 mmHg. Mild MR.  Cardiac Holter monitor study 12/21/2015    Sinus rhythm, avg HR 82 bpm (range  bpm). Numerous PVCs. A few short runs of VT.  Cardiovascular LE venous duplex 07/08/2016    No DVT bilaterally.     Cholelithiasis 2008    on US    Chronic back pain     Dr. Divya Nielsen    Chronic venous insufficiency 2016    Dr Dandre Mead tenosynovitis     Dr Iglesia Correia    Diverticulitis 12/2017    CT proven    Dupuytren's contracture of both hands     Dr Matthew Blandon FHx: heart disease     GERD (gastroesophageal reflux disease) 2/99    on UGI    History of palpitations 2005    Hyperlipidemia     calculated 10 year risk score was 1.8% (12/15); 2.2% (10/16)    Hypovitaminosis D     Myofacial pain     neck and thoracic    OA (osteoarthritis)     lumbar DDD    Obesity     peak weight 200 lbs, bmi 33.3 from 5/13; qsymia ineffective, intol contrave, delcined med supervised wt loss; 24h U petar nl    Osteopenia 2007    t score -1 spine, -0.2 hip Dr. Patterson Moder Pericardial cyst 12/13    calcified on CT    Prediabetes 11/12    Restless legs syndrome     Tachycardia     neg eval Dr. Nancy Thompson 2004    Tension headache     Thyroid nodule 8/12    3mm; 5/13, 12/13, 11/14, 1/16 no change  Trigger finger of both hands     Dr Iglesia Correia       PSH:   Past Surgical History:   Procedure Laterality Date    CARDIAC SURG PROCEDURE UNLIST  2004    Holter negative    CARDIAC SURG PROCEDURE UNLIST  2004    echo nl lv, ef 65%    HAND/FINGER SURGERY UNLISTED      HX BUNIONECTOMY  2005    bilateral    HX CARPAL TUNNEL RELEASE  12/08    HX CHOLECYSTECTOMY  05/05/2017    robotic single site cholecystectomy Dr Bony Pedraza HX COLONOSCOPY      HBV 2011 negative    HX CYST REMOVAL  2007    right lumbar     HX ORTHOPAEDIC      trigger finger release Dr Divya Nielsen    [facility]      heart ablation    HX PACEMAKER  02/28/2017    [employer]    VASCULAR SURGERY PROCEDURE UNLIST  3/12    venous doppler negative       Social HX:   Social History     Social History    Marital status:      Spouse name: N/A    Number of children: 1    Years of education: N/A     Occupational History    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      Social History Main Topics    Smoking status: Former Smoker     Packs/day: 1.00     Years: 20.00     Quit date: 1/14/1993    Smokeless tobacco: Never Used      Comment: 1994    Alcohol use 0.0 oz/week     0 Standard drinks or equivalent per week      Comment: rarely    Drug use: No    Sexual activity: Not on file     Other Topics Concern    Not on file     Social History Narrative       FHX:   Family History   Problem Relation Age of Onset    Hypertension Mother     Diabetes Mother     Stroke Mother     Arthritis-osteo Mother     Cancer Mother      breast    Elevated Lipids Mother     Diabetes Father     Arthritis-osteo Father     Heart Disease Father     Elevated Lipids Father    Junnie Daily Sister     Migraines Sister     Alcohol abuse Brother     Arthritis-osteo Brother        Allergy:   Allergies   Allergen Reactions    Adhesive Tape-Silicones Contact Dermatitis    Bee Sting [Sting, Bee] Anaphylaxis    Benzoyl Peroxide Rash    Betadine [Povidone-Iodine] Rash    Chlorhexidine Towelette Rash    Codeine Nausea and Vomiting    Contrave [Naltrexone-Bupropion] Other (comments)     Vision changes       Home Medications:     Prescriptions Prior to Admission   Medication Sig    pramipexole (MIRAPEX) 1 mg tablet TAKE 1 TABLET BY MOUTH THREE TIMES DAILY (Patient taking differently: TAKE 2 TABLET BY MOUTH HS)    calcium-cholecalciferol, D3, (CALTRATE 600+D) tablet Take 1 Tab by mouth daily.  fish oil-dha-epa 1,200-144-216 mg cap Take 1 Cap by mouth daily.  MULTIVITS-MIN/IRON/FA/LUTEIN (CENTRUM SILVER WOMEN PO) Take 1 Tab by mouth daily.  EPIPEN 2-PRITI 0.3 mg/0.3 mL injection INJECT 0.3 ML BY INTRAMUSCULAR ROUTE ONCE AS NEEDED FOR UP TO 1 DOSE. Review of Systems:     A complete 10 point review of systems was performed and pertinents are as per the HPI. Remainder of the review of systems was negative. Visit Vitals    /66    Pulse (!) 20    Temp 97.6 °F (36.4 °C)    Resp 20    Ht 5' 5\" (1.651 m)    Wt 89.8 kg (198 lb)    SpO2 98%    Breastfeeding No    BMI 32.95 kg/m2       Physical Assessment:     General: alert, cooperative, no acute distress, appears stated age. HEENT: normocephalic, no scleral icterus, moist mucous membranes, EOMs intact, no neck masses noted. Respiratory: lungs clear to auscultation bilaterally. Cardiovascular: regular rate and rhythm, no murmurs, rubs or gallops. Abdomen: normal bowel sounds, soft, non-tender to palpation. Extremities: no lower extremity edema, no cyanosis or clubbing. Neuro: alert and oriented x 3; non-focal exam.  Skin: no rashes. Psych: normal mood and affect. Vincent Kendall MD  [facility]  [email or web address]  Phone: [phone]  Pager: [phone]  [email or web address]

## 2018-03-05 NOTE — DISCHARGE INSTRUCTIONS
DISCHARGE SUMMARY from Nurse     POST-PROCEDURE INSTRUCTIONS:    Call your Physician if you:  ? Observe any excess bleeding. ? Develop a temperature over 100.5o F.  ? Experience abdominal, shoulder or chest pain. ? Notice any signs of decreased circulation or nerve impairment to an extremity such as a change in color, persistent numbness, tingling, coldness or increase in pain. ? Vomit blood or you have nausea and vomiting lasting longer than 4 hours. ? Are unable to take medications. ? Are unable to urinate within 8 hours after discharge following general anesthesia or intravenous sedation. For the next 24 hours after receiving general anesthesia or intravenous sedation, or while taking prescription Narcotics, limit your activities:  ? Do NOT drive a motor vehicle, operate hazard machinery or power tools, or perform tasks that require coordination. The medication you received during your procedure may have some effect on your mental awareness. ? Do NOT make important personal or business decisions. The medication you received during your procedure may have some effect on your mental awareness. ? Do NOT drink alcoholic beverages. These drinks do not mix well with the medications that have been given to you. ? Upon discharge from the hospital, you must be accompanied by a responsible adult. ? Resume your diet as directed by your physician. ? Resume medications as your physician has prescribed. ? Please give a list of your current medications to your Primary Care Provider. ? Please update this list whenever your medications are discontinued, doses are changed, or new medications (including over-the-counter products) are added. ? Please carry medication information at all times in case of emergency situations. These are general instructions for a healthy lifestyle:    No smoking/ No tobacco products/ Avoid exposure to second hand smoke.    Surgeon General's Warning:  Quitting smoking now greatly reduces serious risk to your health. Obesity, smoking, and a sedentary lifestyle greatly increase your risk for illness.  A healthy diet, regular physical exercise & weight monitoring are important for maintaining a healthy lifestyle   You may be retaining fluid if you have a history of heart failure or if you experience any of the following symptoms:  Weight gain of 3 pounds or more overnight or 5 pounds in a week, increased swelling in our hands or feet or shortness of breath while lying flat in bed. Please call your doctor as soon as you notice any of these symptoms; do not wait until your next office visit. Recognize signs and symptoms of STROKE:  F  -  Face looks uneven  A  -  Arms unable to move or move unevenly  S  -  Speech slurred or non-existent  T  -  Time to call 911 - as soon as signs and symptoms begin - DO NOT go back to bed or wait to see If you get better - TIME IS BRAIN. Colorectal Screening   Colorectal cancer almost always develops from precancerous polyps (abnormal growths) in the colon or rectum. Screening tests can find precancerous polyps, so that they can be removed before they turn into cancer. Screening tests can also find colorectal cancer early, when treatment works best.  H[employer] [place]ap Speak with your physician about when you should begin screening and how often you should be tested. Colonoscopy: What to Expect at [address] [place] [address]  After you have a colonoscopy, you will stay at the clinic for 1 to 2 hours until the medicines wear off. Then you can go home. But you will need to arrange for a ride. Your doctor will tell you when you can eat and do your other usual activities. Your doctor will talk to you about when you will need your next colonoscopy. Your doctor can help you decide how often you need to be checked. This will depend on the results of your test and your risk for colorectal cancer. After the test, you may be bloated or have gas pains.  You may need to pass gas. If a biopsy was done or a polyp was removed, you may have streaks of blood in your stool (feces) for a few days. This care sheet gives you a general idea about how long it will take for you to recover. But each person recovers at a different pace. Follow the steps below to get better as quickly as possible. How can you care for yourself at home? Activity  · Rest when you feel tired. · You can do your normal activities when it feels okay to do so. Diet  · Follow your doctor's directions for eating. · Unless your doctor has told you not to, drink plenty of fluids. This helps to replace the fluids that were lost during the colon prep. · Do not drink alcohol. Medicines  · If polyps were removed or a biopsy was done during the test, your doctor may tell you not to take aspirin or other anti-inflammatory medicines for a few days. These include ibuprofen (Advil, Motrin) and naproxen (Aleve). Other instructions  · For your safety, do not drive or operate machinery until the medicine wears off and you can think clearly. Your doctor may tell you not to drive or operate machinery until the day after your test.  · Do not sign legal documents or make major decisions until the medicine wears off and you can think clearly. The anesthesia can make it hard for you to fully understand what you are agreeing to. Follow-up care is a key part of your treatment and safety. Be sure to make and go to all appointments, and call your doctor if you are having problems. It's also a good idea to know your test results and keep a list of the medicines you take. When should you call for help? Call 911 anytime you think you may need emergency care. For example, call if:  · You passed out (lost consciousness). · You pass maroon or bloody stools. · You have severe belly pain. Call your doctor now or seek immediate medical care if:  · Your stools are black and tarlike.   · Your stools have streaks of blood, but you did not have a biopsy or any polyps removed. · You have belly pain, or your belly is swollen and firm. · You vomit. · You have a fever. · You are very dizzy. Watch closely for changes in your health, and be sure to contact your doctor if you have any problems. Where can you learn more? Go to [employer]  Enter E264 in the search box to learn more about \"Colonoscopy: What to Expect at Home. \"   © 6918-5479 Healthwise, [employer]. Care instructions adapted under license by [employer] (which disclaims liability or warranty for this information). This care instruction is for use with your licensed healthcare professional. If you have questions about a medical condition or this instruction, always ask your healthcare professional. [address] any warranty or liability for your use of this information. Content Version: 95.4.094897; Current as of: November 14, 2014         Colon Polyps: Care Instructions  Your Care Instructions    Colon polyps are growths in the colon or the rectum. The cause of most colon polyps is not known, and most people who get them do not have any problems. But a certain kind can turn into cancer. For this reason, regular testing for colon polyps is important for people age 48 and older and anyone who has an increased risk for colon cancer. Polyps are usually found through routine colon cancer screening tests. Although most colon polyps are not cancerous, they are usually removed and then tested for cancer. Screening for colon cancer saves lives because the cancer can usually be cured if it is caught early. If you have a polyp that is the type that can turn into cancer, you may need more tests to examine your entire colon. The doctor will remove any other polyps that he or she finds, and you will be tested more often. Follow-up care is a key part of your treatment and safety.  Be sure to make and go to all appointments, and call your doctor if you are having problems. It's also a good idea to know your test results and keep a list of the medicines you take. How can you care for yourself at home? Regular exams to look for colon polyps are the best way to prevent polyps from turning into colon cancer. These can include stool tests, sigmoidoscopy, colonoscopy, and CT colonography. Talk with your doctor about a testing schedule that is right for you. To prevent polyps  There is no home treatment that can prevent colon polyps. But these steps may help lower your risk for cancer. · Stay active. Being active can help you get to and stay at a healthy weight. Try to exercise on most days of the week. Walking is a good choice. · Eat well. Choose a variety of vegetables, fruits, legumes (such as peas and beans), fish, poultry, and whole grains. · Do not smoke. If you need help quitting, talk to your doctor about stop-smoking programs and medicines. These can increase your chances of quitting for good. · If you drink alcohol, limit how much you drink. Limit alcohol to 2 drinks a day for men and 1 drink a day for women. When should you call for help? Call your doctor now or seek immediate medical care if:  ? · You have severe belly pain. ? · Your stools are maroon or very bloody. ? Watch closely for changes in your health, and be sure to contact your doctor if:  ? · You have a fever. ? · You have nausea or vomiting. ? · You have a change in bowel habits (new constipation or diarrhea). ? · Your symptoms get worse or are not improving as expected. Where can you learn more? Go to http://mima-jose c.info/. Enter 95 [id number] in the search box to learn more about \"Colon Polyps: Care Instructions. \"  Current as of: May 12, 2017  Content Version: 11.4  © 1864-0094 Healthwise, [employer]. Care instructions adapted under license by "[employer]" (which disclaims liability or warranty for this information).  If you have questions about a medical condition or this instruction, always ask your healthcare professional. [place] [address] any warranty or liability for your use of this information.

## 2018-03-22 ENCOUNTER — HOSPITAL ENCOUNTER (OUTPATIENT)
Dept: LAB | Age: 58
Discharge: HOME | End: 2018-03-22
Payer: COMMERCIAL

## 2018-03-22 ENCOUNTER — LAB ONLY (OUTPATIENT)
Dept: INTERNAL MEDICINE CLINIC | Age: 58
End: 2018-03-22

## 2018-03-22 DIAGNOSIS — E78.5 HYPERLIPIDEMIA, UNSPECIFIED HYPERLIPIDEMIA TYPE: Primary | ICD-10-CM

## 2018-03-22 DIAGNOSIS — Z00.00 ROUTINE GENERAL MEDICAL EXAMINATION AT A HEALTH CARE FACILITY: ICD-10-CM

## 2018-03-22 DIAGNOSIS — R73.03 PREDIABETES: ICD-10-CM

## 2018-03-22 DIAGNOSIS — R73.01 IFG (IMPAIRED FASTING GLUCOSE): ICD-10-CM

## 2018-03-22 DIAGNOSIS — E78.5 HYPERLIPIDEMIA, UNSPECIFIED HYPERLIPIDEMIA TYPE: ICD-10-CM

## 2018-03-22 LAB
ALBUMIN SERPL-MCNC: 4.1 G/DL (ref 3.4–5)
ALBUMIN/GLOB SERPL: 1.2 {RATIO} (ref 0.8–1.7)
ALP SERPL-CCNC: 80 U/L (ref 45–117)
ALT SERPL-CCNC: 16 U/L (ref 13–56)
ANION GAP SERPL CALC-SCNC: 10 MMOL/L (ref 3–18)
AST SERPL-CCNC: 16 U/L (ref 15–37)
BASOPHILS # BLD: 0 K/UL (ref 0–0.06)
BASOPHILS NFR BLD: 0 % (ref 0–2)
BILIRUB SERPL-MCNC: 0.4 MG/DL (ref 0.2–1)
BUN SERPL-MCNC: 24 MG/DL (ref 7–18)
BUN/CREAT SERPL: 24 (ref 12–20)
CALCIUM SERPL-MCNC: 9.5 MG/DL (ref 8.5–10.1)
CHLORIDE SERPL-SCNC: 103 MMOL/L (ref 100–108)
CHOLEST SERPL-MCNC: 284 MG/DL
CO2 SERPL-SCNC: 25 MMOL/L (ref 21–32)
CREAT SERPL-MCNC: 0.98 MG/DL (ref 0.6–1.3)
DIFFERENTIAL METHOD BLD: NORMAL
EOSINOPHIL # BLD: 0.1 K/UL (ref 0–0.4)
EOSINOPHIL NFR BLD: 1 % (ref 0–5)
ERYTHROCYTE [DISTWIDTH] IN BLOOD BY AUTOMATED COUNT: 14.1 % (ref 11.6–14.5)
EST. AVERAGE GLUCOSE BLD GHB EST-MCNC: 126 MG/DL
GLOBULIN SER CALC-MCNC: 3.3 G/DL (ref 2–4)
GLUCOSE SERPL-MCNC: 106 MG/DL (ref 74–99)
HBA1C MFR BLD: 6 % (ref 4.2–5.6)
HCT VFR BLD AUTO: 43.4 % (ref 35–45)
HDLC SERPL-MCNC: 80 MG/DL (ref 40–60)
HDLC SERPL: 3.6 {RATIO} (ref 0–5)
HGB BLD-MCNC: 14.4 G/DL (ref 12–16)
LDLC SERPL CALC-MCNC: 152.2 MG/DL (ref 0–100)
LIPID PROFILE,FLP: ABNORMAL
LYMPHOCYTES # BLD: 2.8 K/UL (ref 0.9–3.6)
LYMPHOCYTES NFR BLD: 34 % (ref 21–52)
MCH RBC QN AUTO: 30.7 PG (ref 24–34)
MCHC RBC AUTO-ENTMCNC: 33.2 G/DL (ref 31–37)
MCV RBC AUTO: 92.5 FL (ref 74–97)
MONOCYTES # BLD: 0.7 K/UL (ref 0.05–1.2)
MONOCYTES NFR BLD: 8 % (ref 3–10)
NEUTS SEG # BLD: 4.7 K/UL (ref 1.8–8)
NEUTS SEG NFR BLD: 57 % (ref 40–73)
PLATELET # BLD AUTO: 216 K/UL (ref 135–420)
PMV BLD AUTO: 11.6 FL (ref 9.2–11.8)
POTASSIUM SERPL-SCNC: 4 MMOL/L (ref 3.5–5.5)
PROT SERPL-MCNC: 7.4 G/DL (ref 6.4–8.2)
RBC # BLD AUTO: 4.69 M/UL (ref 4.2–5.3)
SODIUM SERPL-SCNC: 138 MMOL/L (ref 136–145)
TRIGL SERPL-MCNC: 259 MG/DL (ref ?–150)
VLDLC SERPL CALC-MCNC: 51.8 MG/DL
WBC # BLD AUTO: 8.3 K/UL (ref 4.6–13.2)

## 2018-03-22 PROCEDURE — 80053 COMPREHEN METABOLIC PANEL: CPT | Performed by: INTERNAL MEDICINE

## 2018-03-22 PROCEDURE — 85025 COMPLETE CBC W/AUTO DIFF WBC: CPT | Performed by: INTERNAL MEDICINE

## 2018-03-22 PROCEDURE — 80061 LIPID PANEL: CPT | Performed by: INTERNAL MEDICINE

## 2018-03-22 PROCEDURE — 36415 COLL VENOUS BLD VENIPUNCTURE: CPT | Performed by: INTERNAL MEDICINE

## 2018-03-22 PROCEDURE — 83036 HEMOGLOBIN GLYCOSYLATED A1C: CPT | Performed by: INTERNAL MEDICINE

## 2018-03-25 NOTE — PROGRESS NOTES
62 y.o. WHITE OR  female who presents for RPE    She is very happy with the left thumb surgery done by Dr Adilene Flores    No other cardiovascular complaints. No syncope after having the pacer placement    No  complaints. She had diverticulitis late last year and recent colo Dr Jenaro Mccann was as below    Dr Misael Romero did not recommend any procedures for the swelling which was actually resolved fully now    She wanted to discuss her borderline low gfr. Looking at the trend, they were lowest when she was using voltaren for her arthralgias and seemed to have improved since then as she's minimized usage. Also, she reports having some transient renal dysfunction during her last pregnancy but that improved    IF 3/18    Past Medical History:   Diagnosis Date    Agatston CAC score, <100 02/29/2016    ca score ZERO    Arrhythmia     PVC 10% burden; s/p partial ablation Dr Stephanie Guillaume 2/16    Asthma     h/o exercise induced, denies 3/2/18    Cardiac echocardiogram 01/19/2016    EF 55-60%. No RWMA. Indeterminate diastolic fx. RVSP 26 mmHg. Mild MR.  Cardiac Holter monitor study 12/21/2015    Sinus rhythm, avg HR 82 bpm (range  bpm). Numerous PVCs. A few short runs of VT.  Cardiovascular LE venous duplex 07/08/2016    No DVT bilaterally.     Cholelithiasis 2008    on US    Chronic back pain     Dr. Carol Hannon    Chronic venous insufficiency 2016    Dr Karthikeyan Easley polyp 03/05/2018    Dr Jenaro Mccann; serrated polyp and divertics    Malik Hensley tenosynovitis     Dr Therese Aaron Diverticulitis 12/2017    CT proven    Dupuytren's contracture of both hands     Dr Therese Aaron FHx: heart disease     GERD (gastroesophageal reflux disease) 2/99    on UGI    History of palpitations 2005    Hyperlipidemia     calculated 10 year risk score was 1.8% (12/15); 2.2% (10/16)    Hypovitaminosis D     Myofacial pain     neck and thoracic    OA (osteoarthritis)     lumbar DDD    Obesity     peak weight 200 lbs, bmi 33.3 from 5/13; qsymia ineffective, intol contrave, declined med supervised wt loss; 24h U petar nl; IF 3/18    Osteopenia 2007    t score -1 spine, -0.2 hip Dr. Leonardo Collins Pericardial cyst 12/13    calcified on CT    Prediabetes 11/12    Restless legs syndrome     Tachycardia     neg eval Dr. Nancy Thompson 2004    Tension headache     Thyroid nodule 8/12    3mm; 5/13, 12/13, 11/14, 1/16 no change    Trigger finger of both hands     Dr Iglesia Correia     Past Surgical History:   Procedure Laterality Date    CARDIAC SURG PROCEDURE UNLIST  2004    Holter negative    CARDIAC SURG PROCEDURE UNLIST  2004    echo nl lv, ef 65%    CARDIAC SURG PROCEDURE UNLIST  02/2016    Dr Carmelita Moon; heart ablation    COLONOSCOPY N/A 3/5/2018    HBV 2011 negative; Dr Zaria Curtis 3/5/18 serrated polyp and divertics    HAND/FINGER SURGERY UNLISTED  12/2017    Dr Luke Diamond; left thumb cmc arthroplasty    HX BUNIONECTOMY  2005    bilateral    HX CARPAL TUNNEL RELEASE  12/08    HX CHOLECYSTECTOMY  05/05/2017    robotic single site cholecystectomy Dr Senait Trinh  2007    right lumbar     HX ORTHOPAEDIC      trigger finger release Dr Divya Nielsen    HX PACEMAKER  02/28/2017    [employer]    VASCULAR SURGERY PROCEDURE UNLIST  3/12    venous doppler negative     Social History     Social History    Marital status:      Spouse name: N/A    Number of children: 1    Years of education: N/A     Occupational History    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      Social History Main Topics    Smoking status: Former Smoker     Packs/day: 1.00     Years: 20.00     Quit date: 1/14/1993    Smokeless tobacco: Never Used      Comment: 1994    Alcohol use 0.0 oz/week     0 Standard drinks or equivalent per week      Comment: rarely    Drug use: No    Sexual activity: Not on file     Other Topics Concern    Not on file     Social History Narrative     Current Outpatient Prescriptions   Medication Sig    pramipexole (MIRAPEX) 1 mg tablet TAKE 1 TABLET BY MOUTH THREE TIMES DAILY (Patient taking differently: TAKE 2 TABLET BY MOUTH HS)    EPIPEN 2-PRITI 0.3 mg/0.3 mL injection INJECT 0.3 ML BY INTRAMUSCULAR ROUTE ONCE AS NEEDED FOR UP TO 1 DOSE.  calcium-cholecalciferol, D3, (CALTRATE 600+D) tablet Take 1 Tab by mouth daily.  fish oil-dha-epa 1,200-144-216 mg cap Take 1 Cap by mouth daily.  MULTIVITS-MIN/IRON/FA/LUTEIN (CENTRUM SILVER WOMEN PO) Take 1 Tab by mouth daily. No current facility-administered medications for this visit. Allergies   Allergen Reactions    Adhesive Tape-Silicones Contact Dermatitis    Bee Sting [Sting, Bee] Anaphylaxis    Benzoyl Peroxide Rash    Betadine [Povidone-Iodine] Rash    Chlorhexidine Towelette Rash    Codeine Nausea and Vomiting    Contrave [Naltrexone-Bupropion] Other (comments)     Vision changes     REVIEW OF SYSTEMS: mammo 4/17, gyn 3/17, colo 3/18 Dr Yohan Fowler, DEXA 12/15  Ophtho  no vision change or eye pain  Oral  no mouth pain, tongue or tooth problems  Ears  no hearing loss, ear pain, fullness, no swallowing problems  Cardiac  no CP, PND, orthopnea  Chest  no breast masses  Resp  no wheezing, chronic coughing, dyspnea  GI  no heartburn, nausea, vomiting, change in bowel habits, bleeding, hemorrhoids  Urinary  no dysuria, hematuria, flank pain, urgency, frequency  Genitals  no genital lesions, discharge, masses, ulceration, warts  Constitutional  no wt loss, night sweats, unexplained fevers    Visit Vitals    /88    Pulse 96    Temp 98.1 °F (36.7 °C) (Oral)    Resp 14    Ht 5' 5\" (1.651 m)    Wt 204 lb (92.5 kg)    SpO2 99%    BMI 33.95 kg/m2   Affect is appropriate. Mood stable  No apparent distress  HEENT --Anicteric sclerae, tympanic membranes normal,  ear canals normal.  PERRL, EOMI, conjunctiva and lids normal.  Disks were sharp  Sinuses were nontender, turbinates normal, hearing normal.  Oropharynx without  erythema, normal tongue, oral mucosa and tonsils.   No thyromegaly, JVD, or bruits. Lungs --Clear to auscultation, normal percussion. Heart --Regular rate and rhythm, no murmurs, rubs, gallops, or clicks. Chest wall --Nontender to palpation. PMI normal.  Abdomen -- Soft and nontender, no hepatosplenomegaly or masses. Ext without c/c/e    LABS  From 3/12 showed   gluc 120, cr 0.72, gfr 97,                                                                                   wbc 6.3, hb 13.0, plt 230, tsh 0.69, lyme neg, ua neg  From 11/12 showed gluc 105, cr 1.04, gfr 62,     hba1c 6.0,              chol 246, tg 306, hdl 61, ldl-c 124,                 tsh 1.21  From 5/13 showed                            chol 302.  tg 281, hdl 60, ldl-c 186  From 11/13 showed gluc 105, cr 0.95, gfr 69,    hba1c 6.1, ldl-p 903, chol 247, tg 134, hdl 74, ldl-c 146  From 11/13 showed gluc 80,   cr 0.92, gfr 72,  alt 15,               chol 146, tg 85,   hdl 78, ldl-c 51,   wbc 5.9, hb 13.9, plt 238  From 11/14 showed gluc 96,   cr 0.92, gfr>60, alt<5,  hba1c 6.1,   chol 233, tg 174, hdl 75, ldl-c 123, wbc 5.7, hb 13.3, plt 204,           ua neg  From 2/15 showed   gluc 94,   cr 0.98, gfr 59,  alt 7,   hba1c 5.8,              wbc 7.6, hb 13.7, plt 228, tsh 0.62,          ua neg  From 6/15 showed        hba1c 5.8  From 12/15 showed gluc 96,   cr 0.94, gfr>60, alt 31,    chol 274, tg 209, hdl 91, ldl-c 141  From 6/16 showed   gluc 102, cr 0.89, gfr>60, alt 21, hba1c 6.1,   chol 319, tg 314, hdl 67, ldl-c 189, wbc 6.3, hb 12.5, plt 188, tsh 1.49, ft4 1.1,     tt3 86, tpo ab neg  From 7/16 showed   gluc 102, cr 1.44, gfr 38,              24h U petar 18  From 9/16 showed                           ua neg  From 10/16 showed gluc 103, cr 1.05, gfr 54,  alt 23, hba1c 5.4,   chol 292, tg 292, hdl 67, ldl-c 167  From 12/17 showed gluc 119, cr 1.01, gfr 57,  alt 33,               wbc 7.0, hb 13.5, plt 257, lip 170    Results for orders placed or performed during the hospital encounter of 03/22/18   CBC WITH AUTOMATED DIFF   Result Value Ref Range    WBC 8.3 4.6 - 13.2 K/uL    RBC 4.69 4.20 - 5.30 M/uL    HGB 14.4 12.0 - 16.0 g/dL    HCT 43.4 35.0 - 45.0 %    MCV 92.5 74.0 - 97.0 FL    MCH 30.7 24.0 - 34.0 PG    MCHC 33.2 31.0 - 37.0 g/dL    RDW 14.1 11.6 - 14.5 %    PLATELET 084 165 - 614 K/uL    MPV 11.6 9.2 - 11.8 FL    NEUTROPHILS 57 40 - 73 %    LYMPHOCYTES 34 21 - 52 %    MONOCYTES 8 3 - 10 %    EOSINOPHILS 1 0 - 5 %    BASOPHILS 0 0 - 2 %    ABS. NEUTROPHILS 4.7 1.8 - 8.0 K/UL    ABS. LYMPHOCYTES 2.8 0.9 - 3.6 K/UL    ABS. MONOCYTES 0.7 0.05 - 1.2 K/UL    ABS. EOSINOPHILS 0.1 0.0 - 0.4 K/UL    ABS. BASOPHILS 0.0 0.0 - 0.06 K/UL    DF AUTOMATED     METABOLIC PANEL, COMPREHENSIVE   Result Value Ref Range    Sodium 138 136 - 145 mmol/L    Potassium 4.0 3.5 - 5.5 mmol/L    Chloride 103 100 - 108 mmol/L    CO2 25 21 - 32 mmol/L    Anion gap 10 3.0 - 18 mmol/L    Glucose 106 (H) 74 - 99 mg/dL    BUN 24 (H) 7.0 - 18 MG/DL    Creatinine 0.98 0.6 - 1.3 MG/DL    BUN/Creatinine ratio 24 (H) 12 - 20      GFR est AA >60 >60 ml/min/1.73m2    GFR est non-AA 59 (L) >60 ml/min/1.73m2    Calcium 9.5 8.5 - 10.1 MG/DL    Bilirubin, total 0.4 0.2 - 1.0 MG/DL    ALT (SGPT) 16 13 - 56 U/L    AST (SGOT) 16 15 - 37 U/L    Alk.  phosphatase 80 45 - 117 U/L    Protein, total 7.4 6.4 - 8.2 g/dL    Albumin 4.1 3.4 - 5.0 g/dL    Globulin 3.3 2.0 - 4.0 g/dL    A-G Ratio 1.2 0.8 - 1.7     HEMOGLOBIN A1C WITH EAG   Result Value Ref Range    Hemoglobin A1c 6.0 (H) 4.2 - 5.6 %    Est. average glucose 126 mg/dL   LIPID PANEL   Result Value Ref Range    LIPID PROFILE          Cholesterol, total 284 (H) <200 MG/DL    Triglyceride 259 (H) <150 MG/DL    HDL Cholesterol 80 (H) 40 - 60 MG/DL    LDL, calculated 152.2 (H) 0 - 100 MG/DL    VLDL, calculated 51.8 MG/DL    CHOL/HDL Ratio 3.6 0 - 5.0       Patient Active Problem List   Diagnosis Code    Hyperlipidemia E78.5    Prediabetes R73.03    Gastroesophageal reflux disease without esophagitis K21.9    DDD (degenerative disc disease), lumbar M51.36    S/P ablation operation for arrhythmia Z98.890, Z86.79    Obesity (BMI 30-39. 9) E66.9    Varicose veins of both lower extremities I83.93    Pacemaker Z95.0    Seasonal allergic rhinitis J30.1     Assessment and plan:  1. Ortho. Per her specialist  2. Obesity. Declined med supervised wt loss. Intermittent fasting discussed at length. Explained the concepts in detail. Went over possible physiologic changes that could occur and how that would possibly impact her situation in a positive way. Discussed 16:8 program in particular. We also went over the differences between hunger and sasha hypoglycemia. Look up low insulin index foods. . She will do some more research and consider implementing in the near future  3. GERD. PPI tx and avoidance measures prn  4. Osteopenia. Ca/d, weight bearing exercise as tolerated  5. Hyperlipidemia. Lifestyle and dietary measures  6. H/O thyroid nodule. Stable nodule on f/u.   7. Prediabetes. Continue current approaches and attempts at wt loss  8. S/P ablation and pacemaker placement. F/U Dr Carolyn Carr  9. Polyps and diverticulosis. Fiber, colo 2023  10. Renal.  Very long discussion. I suggested seeing nephrology for opinion but she decided to think about it and call if she changes her mind. Longer term, her gfr is stable since 2012\, recnet imaging neg for hydro, no proteinuria        RTC 9/18    Above conditions discussed at length and patient vocalized understanding.   All questions answered to patient satifaction

## 2018-03-29 ENCOUNTER — OFFICE VISIT (OUTPATIENT)
Dept: INTERNAL MEDICINE CLINIC | Age: 58
End: 2018-03-29

## 2018-03-29 VITALS
BODY MASS INDEX: 33.99 KG/M2 | OXYGEN SATURATION: 99 % | TEMPERATURE: 98.1 F | RESPIRATION RATE: 14 BRPM | DIASTOLIC BLOOD PRESSURE: 88 MMHG | HEART RATE: 96 BPM | HEIGHT: 65 IN | WEIGHT: 204 LBS | SYSTOLIC BLOOD PRESSURE: 122 MMHG

## 2018-03-29 DIAGNOSIS — R94.4 DECREASED GFR: ICD-10-CM

## 2018-03-29 DIAGNOSIS — E78.5 HYPERLIPIDEMIA, UNSPECIFIED HYPERLIPIDEMIA TYPE: ICD-10-CM

## 2018-03-29 DIAGNOSIS — Z98.890 S/P ABLATION OPERATION FOR ARRHYTHMIA: ICD-10-CM

## 2018-03-29 DIAGNOSIS — K21.9 GASTROESOPHAGEAL REFLUX DISEASE WITHOUT ESOPHAGITIS: ICD-10-CM

## 2018-03-29 DIAGNOSIS — Z86.79 S/P ABLATION OPERATION FOR ARRHYTHMIA: ICD-10-CM

## 2018-03-29 DIAGNOSIS — E66.9 OBESITY (BMI 30-39.9): ICD-10-CM

## 2018-03-29 DIAGNOSIS — Z95.0 PACEMAKER: ICD-10-CM

## 2018-03-29 DIAGNOSIS — Z00.00 PHYSICAL EXAM: Primary | ICD-10-CM

## 2018-03-29 DIAGNOSIS — R73.03 PREDIABETES: ICD-10-CM

## 2018-03-29 RX ORDER — PRAMIPEXOLE DIHYDROCHLORIDE 1 MG/1
TABLET ORAL
Qty: 180 TAB | Refills: 0 | Status: SHIPPED | OUTPATIENT
Start: 2018-03-29 | End: 2018-05-28 | Stop reason: SDUPTHER

## 2018-03-29 NOTE — PROGRESS NOTES
1. Have you been to the ER, urgent care clinic or hospitalized since your last visit? NO.     2. Have you seen or consulted any other health care providers outside of the [address] [place] [address] since your last visit (Include any pap smears or colon screening)?  NO

## 2018-03-29 NOTE — MR AVS SNAPSHOT
Tyler Bacca 
 
 
 [address], [address] [facility] 
[phone] Patient: Juan Nix MRN: [id number] :1960 Visit Information Date & Time Provider Department Dept. Phone Encounter #  
 3/29/2018 10:00 AM Radha Lozada MD [facility] [phone] Your Appointments 2018  9:00 AM  
Follow Up with Iman Mcdonald MD  
[facility] ([address]) Appt Note: 1 year follow up with an EKG  
 [place] [address] [place] [id number] [phone] [place] Katharine  
  
    
 2018  9:25 AM  
LAB with [facility] NURSE VISIT [facility] ([facility]) Appt Note: labs [address]  
 [address], [address] [facility] [address] [place]  
  
   
 [address], [address]  
  
    
 2018  9:40 AM  
Office Visit with Radha Lozada MD  
[facility] [address]) Appt Note: ov [address]  
 [address], [address] [place] [address] [place]  
  
   
 [address], [address] Upcoming Health Maintenance Date Due Influenza Age 5 to Adult 2017 BREAST CANCER SCRN MAMMOGRAM 4/3/2019 PAP AKA CERVICAL CYTOLOGY 3/23/2020 COLONOSCOPY 3/5/2023 DTaP/Tdap/Td series (2 - Td) 3/23/2027 Allergies as of 3/29/2018  Review Complete On: 3/29/2018 By: Shonna Winter Severity Noted Reaction Type Reactions Adhesive Tape-silicones      Contact Dermatitis Bee Sting [Sting, Bee]    Anaphylaxis Benzoyl Peroxide    Rash Betadine [Povidone-iodine]  2016    Rash Chlorhexidine Towelette  2016    Rash Codeine    Nausea and Vomiting Contrave [Naltrexone-bupropion]  2015    Other (comments) Vision changes Current Immunizations  Reviewed on 8/13/2012 Name Date  
 TD Vaccine 10/5/2010 Tdap 3/23/2017 Zoster Vaccine, Live 11/27/2013 Not reviewed this visit Vitals BP Pulse Temp Resp Height(growth percentile) Weight(growth percentile) 122/88 96 98.1 °F (36.7 °C) (Oral) 14 5' 5\" (1.651 m) 204 lb (92.5 kg) SpO2 BMI OB Status Smoking Status 99% 33.95 kg/m2 Postmenopausal Former Smoker Vitals History BMI and BSA Data Body Mass Index Body Surface Area 33.95 kg/m 2 2.06 m 2 Preferred Pharmacy Pharmacy Name Phone [facility] - [place], [address] [facility] [phone] Your Updated Medication List  
  
   
This list is accurate as of 3/29/18 10:49 AM.  Always use your most recent med list.  
  
  
  
  
 calcium-cholecalciferol (D3) tablet Commonly known as:  CALTRATE 600+D Take 1 Tab by mouth daily. CENTRUM SILVER WOMEN PO Take 1 Tab by mouth daily. EPIPEN 2-PRITI 0.3 mg/0.3 mL injection Generic drug:  EPINEPHrine INJECT 0.3 ML BY INTRAMUSCULAR ROUTE ONCE AS NEEDED FOR UP TO 1 DOSE.  
  
 fish oil-dha-epa 1,200-144-216 mg Cap Take 1 Cap by mouth daily. pramipexole 1 mg tablet Commonly known as:  MIRAPEX TAKE 1 TABLET BY MOUTH THREE TIMES DAILY To-Do List   
 04/04/2018 12:30 PM  
  Appointment with MISTY ADAMS at [address] [place] [address] ([phone]) PAYMENT  For Non-Medicare patients - $15.00 will be collected from you at the time of your exam.  You will be billed $35.00 from the reading [facility]. OUTSIDE FILMS  - Any outside films related to the study being scheduled should be brought with you on the day of the exam.  If this cannot be done there may be a delay in the reading of the study.   MEDICATIONS  - Patient must bring a complete list of all medications currently taking to include prescriptions, over-the-counter meds, herbals, vitamins & any dietary supplements  GENERAL INSTRUCTIONS  - On the day of your exam do not use any bath powder, deodorant or lotions on the armpit area. -Tenderness of breasts may cause an increase of discomfort during procedure. If you are experiencing breast tenderness on the day of your appointment and would like to reschedule, please call [phone]. Introducing [facility] HEALTH SERVICES! Dear Kedar Villasenor: Thank you for requesting a Sonatype account. Our records indicate that you already have an active Sonatype account. You can access your account anytime at [email or web address] Did you know that you can access your hospital and ER discharge instructions at any time in Sonatype? You can also review all of your test results from your hospital stay or ER visit. Additional Information If you have questions, please visit the Frequently Asked Questions section of the Sonatype website at [email or web address] Remember, Sonatype is NOT to be used for urgent needs. For medical emergencies, dial 911. Now available from your iPhone and Android! Please provide this summary of care documentation to your next provider. Your primary care clinician is listed as Rina Chacko. If you have any questions after today's visit, please call [phone].

## 2018-04-04 ENCOUNTER — HOSPITAL ENCOUNTER (OUTPATIENT)
Dept: MAMMOGRAPHY | Age: 58
Discharge: HOME | End: 2018-04-04
Attending: INTERNAL MEDICINE
Payer: COMMERCIAL

## 2018-04-04 DIAGNOSIS — Z12.39 BREAST CANCER SCREENING: ICD-10-CM

## 2018-04-04 PROCEDURE — 77063 BREAST TOMOSYNTHESIS BI: CPT

## 2018-04-05 ENCOUNTER — OFFICE VISIT (OUTPATIENT)
Dept: CARDIOLOGY CLINIC | Age: 58
End: 2018-04-05

## 2018-04-05 ENCOUNTER — CLINICAL SUPPORT (OUTPATIENT)
Dept: CARDIOLOGY CLINIC | Age: 58
End: 2018-04-05

## 2018-04-05 VITALS
WEIGHT: 205 LBS | DIASTOLIC BLOOD PRESSURE: 90 MMHG | OXYGEN SATURATION: 98 % | HEIGHT: 65 IN | HEART RATE: 107 BPM | BODY MASS INDEX: 34.16 KG/M2 | SYSTOLIC BLOOD PRESSURE: 130 MMHG

## 2018-04-05 DIAGNOSIS — Z86.79 S/P ABLATION OPERATION FOR ARRHYTHMIA: ICD-10-CM

## 2018-04-05 DIAGNOSIS — E78.5 HYPERLIPIDEMIA, UNSPECIFIED HYPERLIPIDEMIA TYPE: ICD-10-CM

## 2018-04-05 DIAGNOSIS — Z95.0 PACEMAKER: Primary | ICD-10-CM

## 2018-04-05 DIAGNOSIS — Z98.890 S/P ABLATION OPERATION FOR ARRHYTHMIA: ICD-10-CM

## 2018-04-05 DIAGNOSIS — I49.5 SSS (SICK SINUS SYNDROME) (HCC): ICD-10-CM

## 2018-04-05 NOTE — MR AVS SNAPSHOT
[address] [address] [address] [id number] [phone] Patient: Cara Pandya MRN: [id number] :1960 Visit Information Date & Time Provider Department Dept. Phone Encounter #  
 2018  9:00 AM Sukumar Morrison MD [facility] [address] [id number] Follow-up Instructions Return in about 1 year (around 2019). Your Appointments 2018  9:25 AM  
LAB with [facility] NURSE VISIT [facility] ([facility]) Appt Note: labs [address]  
 [address], [address] [facility] [address] [place]  
  
   
 [address], [facility]  
  
    
 2018  9:40 AM  
Office Visit with Davin Aguilera MD  
[facility]) Appt Note: ov [address]  
 [address], [address] [address] [address] [place]  
  
   
 [address], [facility] Upcoming Health Maintenance Date Due Influenza Age 5 to Adult 2017 PAP AKA CERVICAL CYTOLOGY 3/23/2020 BREAST CANCER SCRN MAMMOGRAM 2020 COLONOSCOPY 3/5/2023 DTaP/Tdap/Td series (2 - Td) 3/23/2027 Allergies as of 2018  Review Complete On: 2018 By: Sukumar Morrison MD  
  
 Severity Noted Reaction Type Reactions Adhesive Tape-silicones  2501    Contact Dermatitis Bee Sting [Sting, Bee]    Anaphylaxis Benzoyl Peroxide    Rash Betadine [Povidone-iodine]  2016    Rash Chlorhexidine Towelette  2016    Rash Codeine    Nausea and Vomiting Contrave [Naltrexone-bupropion]  2015    Other (comments) Vision changes Current Immunizations  Reviewed on 2012 Name Date  
 TD Vaccine 10/5/2010 Tdap 3/23/2017 Zoster Vaccine, Live 2013 Not reviewed this visit You Were Diagnosed With   
  
 Codes Comments Pacemaker    -  Primary ICD-10-CM: Z95.0 ICD-9-CM: V45.01 Hyperlipidemia, unspecified hyperlipidemia type     ICD-10-CM: E78.5 ICD-9-CM: 272.4 S/P ablation operation for arrhythmia     ICD-10-CM: Z98.890, Z86.79 
ICD-9-CM: V45.89 Vitals BP Pulse Height(growth percentile) Weight(growth percentile) SpO2 BMI  
 130/90 (!) 107 5' 5\" (1.651 m) 205 lb (93 kg) 98% 34.11 kg/m2 OB Status Smoking Status Postmenopausal Former Smoker Vitals History BMI and BSA Data Body Mass Index Body Surface Area  
 34.11 kg/m 2 2.07 m 2 Preferred Pharmacy Pharmacy Name Phone [facility] - [place],  [facility] [phone] Your Updated Medication List  
  
   
This list is accurate as of 4/5/18  9:34 AM.  Always use your most recent med list.  
  
  
  
  
 calcium-cholecalciferol (D3) tablet Commonly known as:  CALTRATE 600+D Take 1 Tab by mouth daily. CENTRUM SILVER WOMEN PO Take 1 Tab by mouth daily. EPIPEN 2-PRITI 0.3 mg/0.3 mL injection Generic drug:  EPINEPHrine INJECT 0.3 ML BY INTRAMUSCULAR ROUTE ONCE AS NEEDED FOR UP TO 1 DOSE.  
  
 fish oil-dha-epa 1,200-144-216 mg Cap Take 1 Cap by mouth daily. pramipexole 1 mg tablet Commonly known as:  MIRAPEX TAKE 1 TABLET BY MOUTH THREE TIMES DAILY We Performed the Following AMB POC EKG ROUTINE W/ 12 LEADS, INTER & REP [25599 CPT(R)] Follow-up Instructions Return in about 1 year (around 4/5/2019). Introducing [facility] & HEALTH SERVICES! Dear Holland Reed: Thank you for requesting a [employer] account. Our records indicate that you already have an active NewBay account. You can access your account anytime at [email or web address] Did you know that you can access your hospital and ER discharge instructions at any time in NewBay?   You can also review all of your test results from your hospital stay or ER visit. Additional Information If you have questions, please visit the Frequently Asked Questions section of the [employer] ([employer]) website at [email or web address] Remember, [employer] ([employer]) is NOT to be used for urgent needs. For medical emergencies, dial 911. Now available from your iPhone and Android! Please provide this summary of care documentation to your next provider. Your primary care clinician is listed as Eliud Graff. If you have any questions after today's visit, please call [phone].

## 2018-04-05 NOTE — PROGRESS NOTES
1. Have you been to the ER, urgent care clinic since your last visit? Hospitalized since your last visit? No     2. Have you seen or consulted any other health care providers outside of the [address] [place] [address] since your last visit? Include any pap smears or colon screening.  No

## 2018-04-05 NOTE — PROGRESS NOTES
History of Present Illness: The patient is a 62 y.o. female here for annual follow up. Overall, doing well. She has a Medtronic MRI compatible pacemaker February 2017 for syncope and documented bradycardia by implantable loop recorder. She has been doing well. She any new chest pain, dyspnea, presyncope, syncope, PND, orthopnea or edema since the pacemaker. Impression/Plan:   Medtronic dual chamber pacemaker MRI compatible February 2017 due to syncope and pauses by event monitor. 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 Chronic pain and tension headaches. History of PVCs with attempted ablation February 2016 without recurrence. Exercise induced asthma. Her pacemaker is functioning normally, incision well healed, blood pressure controlled. There is no significant arrhythmia. She does go on the elliptical machine and her heart rate goes up to 150 beats per minute. All questions answered. I will see back on an annual basis with regular home checks of her pacemaker. Past Medical History:   Diagnosis Date    Agatston CAC score, <100 02/29/2016    ca score ZERO    Arrhythmia     PVC 10% burden; s/p partial ablation Dr Art Cordova 2/16    Asthma     h/o exercise induced, denies 3/2/18    Cardiac echocardiogram 01/19/2016    EF 55-60%. No RWMA. Indeterminate diastolic fx. RVSP 26 mmHg. Mild MR.  Cardiac Holter monitor study 12/21/2015    Sinus rhythm, avg HR 82 bpm (range  bpm). Numerous PVCs. A few short runs of VT.  Cardiovascular LE venous duplex 07/08/2016    No DVT bilaterally.     Cholelithiasis     on  2008, 4/17    Chronic back pain     Dr. Luciano Dewitt    Chronic venous insufficiency 2016    Dr Joseph Mcclain polyp 03/05/2018    Dr Gigi Gutierrez; serrated polyp and divertics    De Quervain's tenosynovitis     Dr Lizet Alex Diverticulitis 12/2017    CT proven    Dupuytren's contracture of both hands     Dr Lizet Alex FHx: heart disease  GERD (gastroesophageal reflux disease) 2/99    on UGI    History of palpitations 2005    Hyperlipidemia     calculated 10 year risk score was 1.8% (12/15); 2.2% (10/16)    Hypovitaminosis D     Myofacial pain     neck and thoracic    OA (osteoarthritis)     lumbar DDD    Obesity     peak weight 200 lbs, bmi 33.3 from 5/13; qsymia ineffective, intol contrave, declined med supervised wt loss; 24h U peatr nl; IF 3/18    Osteopenia 2007    t score -1 spine, -0.2 hip Dr. Rowell Seeds Pericardial cyst 12/13    calcified on CT    Prediabetes 11/12    Restless legs syndrome     Tachycardia     neg eval Dr. Julián Cerrato 2004    Tension headache     Thyroid nodule 8/12    3mm; 5/13, 12/13, 11/14, 1/16 no change    Trigger finger of both hands     Dr Marcie Mir       Current Outpatient Prescriptions   Medication Sig Dispense Refill    pramipexole (MIRAPEX) 1 mg tablet TAKE 1 TABLET BY MOUTH THREE TIMES DAILY 180 Tab 0    EPIPEN 2-PRITI 0.3 mg/0.3 mL injection INJECT 0.3 ML BY INTRAMUSCULAR ROUTE ONCE AS NEEDED FOR UP TO 1 DOSE. 2 Syringe 5    calcium-cholecalciferol, D3, (CALTRATE 600+D) tablet Take 1 Tab by mouth daily.  fish oil-dha-epa 1,200-144-216 mg cap Take 1 Cap by mouth daily.  MULTIVITS-MIN/IRON/FA/LUTEIN (CENTRUM SILVER WOMEN PO) Take 1 Tab by mouth daily. Social History   reports that she quit smoking about 25 years ago. She has a 20.00 pack-year smoking history. She has never used smokeless tobacco.   reports that she drinks alcohol. Family History  family history includes Alcohol abuse in her brother; Seu Sykes in her brother, father, mother, and sister; Breast Cancer in her mother; Cancer in her mother; Diabetes in her father and mother; Elevated Lipids in her father and mother; Heart Disease in her father; Hypertension in her mother; Migraines in her sister; Stroke in her mother.     Review of Systems  Except as stated above include:  Constitutional: Negative for fever, chills and malaise/fatigue. HEENT: No congestion or recent URI. Gastrointestinal: No nausea, vomiting, abdominal pain, bloody stools. Pulmonary:  Negative except as stated above. Cardiac:  Negative except as stated above. Musculoskeletal: Negative except as stated above. Neurological:  No localized symptoms. Skin:  Negative except as stated above. Psych:  Negative except as stated above. Endocrine:  Negative except as stated above. PHYSICAL EXAM  BP Readings from Last 3 Encounters:   04/05/18 130/90   03/29/18 122/88   03/05/18 (!) 87/47     Pulse Readings from Last 3 Encounters:   04/05/18 (!) 107   03/29/18 96   03/05/18 70     Wt Readings from Last 3 Encounters:   04/05/18 93 kg (205 lb)   03/29/18 92.5 kg (204 lb)   03/05/18 89.8 kg (198 lb)     General:   Well developed, well groomed. Head/Neck:   No jugular venous distention     No carotid bruits. No evidence of xanthelasma. Lungs:   No respiratory distress. Clear bilaterally. Heart:    Regular rate and rhythm. Normal S1/S2. Palpation of heart with normal point of maximum impulse. No significant murmurs, rubs or gallops. Left pacemaker pocket intact. Abdomen:   Soft and nontender. No palpable abdominal mass or bruits. Extremities:   Intact peripheral pulses. No significant edema. Neurological:   Alert and oriented to person, place, time. No focal neurological deficit visually.   Skin:   No obvious rash    Blood Pressure Metric:  stable

## 2018-04-09 ENCOUNTER — TELEPHONE (OUTPATIENT)
Dept: INTERNAL MEDICINE CLINIC | Age: 58
End: 2018-04-09

## 2018-04-09 NOTE — TELEPHONE ENCOUNTER
Pt called and stated tht she wants to see a kidney specialist, she had discussed with RD she wants someone in [place]

## 2018-04-12 NOTE — PROGRESS NOTES
I have personally seen and evaluated the device findings. Interrogation reviewed and I agree with assessment.     Maria Del Rosario Rivas

## 2018-04-16 ENCOUNTER — OFFICE VISIT (OUTPATIENT)
Dept: ORTHOPEDIC SURGERY | Age: 58
End: 2018-04-16

## 2018-04-16 VITALS
SYSTOLIC BLOOD PRESSURE: 140 MMHG | RESPIRATION RATE: 18 BRPM | BODY MASS INDEX: 34.16 KG/M2 | WEIGHT: 205 LBS | TEMPERATURE: 98.2 F | HEIGHT: 65 IN | HEART RATE: 96 BPM | DIASTOLIC BLOOD PRESSURE: 76 MMHG | OXYGEN SATURATION: 97 %

## 2018-04-16 DIAGNOSIS — M51.36 DDD (DEGENERATIVE DISC DISEASE), LUMBAR: Primary | ICD-10-CM

## 2018-04-16 NOTE — MR AVS SNAPSHOT
[address] [place] [address] [address] [address] [address] [place] [address] 
[phone] Patient: Jayshree Gómez MRN: [id number] Visit Information Date & Time Provider Department Dept. Phone Encounter #  
 2018 10:50 AM Shameka Chavez NP [facility] [phone] [id number] Follow-up Instructions Return if symptoms worsen or fail to improve. Follow-up and Disposition History Your Appointments 2018  9:25 AM  
LAB with [facility] NURSE VISIT [facility] [address] [place] [address] ([facility]) Appt Note: labs [address]  
 [address], [address] Critical access hospital [address] [place]  
  
   
 [address], [address]  
  
    
 2018  9:40 AM  
Office Visit with Gayle Verduzco MD  
[facility] [address] [place] [address] Appt Note: ov [address]  
 [address], [address] [address] [address] [place]  
  
   
 [address], [address]  
  
    
 10/11/2018 10:30 AM  
PROCEDURE with [facility]er [facility] ([facility]) Appt Note: medronic device check per Dr. Smith. [facility] [address] [address] [address] [phone] [address] [facility] [address] 2019  9:00 AM  
Follow Up with Tammy Marie MD  
[facility] ([facility]) Appt Note: 1 yr f/u with EKG  
 [address] [place] [address] [address] [address] [address] [phone] [address] Upcoming Health Maintenance Date Due Influenza Age 5 to Adult 2017 PAP AKA CERVICAL CYTOLOGY 3/23/2020 BREAST CANCER SCRN MAMMOGRAM 2020 COLONOSCOPY 3/5/2023 DTaP/Tdap/Td series (2 - Td) 3/23/2027 Allergies as of 4/16/2018  Review Complete On: 4/16/2018 By: Genet Nguyen NP Severity Noted Reaction Type Reactions Adhesive Tape-silicones  59/47/7145    Contact Dermatitis Bee Sting [Sting, Bee]    Anaphylaxis Benzoyl Peroxide    Rash Betadine [Povidone-iodine]  02/08/2016    Rash Chlorhexidine Towelette  11/11/2016    Rash Codeine    Nausea and Vomiting Contrave [Naltrexone-bupropion]  06/19/2015    Other (comments) Vision changes Current Immunizations  Reviewed on 8/13/2012 Name Date  
 TD Vaccine 10/5/2010 Tdap 3/23/2017 Zoster Vaccine, Live 11/27/2013 Not reviewed this visit You Were Diagnosed With   
  
 Codes Comments DDD (degenerative disc disease), lumbar    -  Primary ICD-10-CM: M51.36 
ICD-9-CM: 722.52 Vitals BP Pulse Temp Resp Height(growth percentile) Weight(growth percentile) 140/76 96 98.2 °F (36.8 °C) 18 5' 5\" (1.651 m) 205 lb (93 kg) SpO2 BMI OB Status Smoking Status 97% 34.11 kg/m2 Postmenopausal Former Smoker BMI and BSA Data Body Mass Index Body Surface Area  
 34.11 kg/m 2 2.07 m 2 Preferred Pharmacy Pharmacy Name Phone [phone] - [address] [phone] Your Updated Medication List  
  
   
This list is accurate as of 4/16/18 12:19 PM.  Always use your most recent med list.  
  
  
  
  
 calcium-cholecalciferol (D3) tablet Commonly known as:  CALTRATE 600+D Take 1 Tab by mouth daily. CENTRUM SILVER WOMEN PO Take 1 Tab by mouth daily. EPIPEN 2-PRITI 0.3 mg/0.3 mL injection Generic drug:  EPINEPHrine INJECT 0.3 ML BY INTRAMUSCULAR ROUTE ONCE AS NEEDED FOR UP TO 1 DOSE.  
  
 fish oil-dha-epa 1,200-144-216 mg Cap Take 1 Cap by mouth daily. pramipexole 1 mg tablet Commonly known as:  MIRAPEX TAKE 1 TABLET BY MOUTH THREE TIMES DAILY Follow-up Instructions Return if symptoms worsen or fail to improve. Introducing [facility]! Dear Kael Neri: Thank you for requesting a [employer] account. Our records indicate that you already have an active [employer] account. You can access your account anytime at [email or web address] Did you know that you can access your hospital and ER discharge instructions at any time in [employer]? You can also review all of your test results from your hospital stay or ER visit. Additional Information If you have questions, please visit the Frequently Asked Questions section of the [employer] website at [email or web address] Remember, [employer] is NOT to be used for urgent needs. For medical emergencies, dial 911. Now available from your iPhone and Android! Please provide this summary of care documentation to your next provider. Your primary care clinician is listed as [facility]. If you have any questions after today's visit, please call [phone].

## 2018-04-16 NOTE — PROGRESS NOTES
Chief complaint/History of Present Illness:  Chief Complaint   Patient presents with    Back Pain     f/u physical therapy     HPI  Hank Darby is a  62 y.o.  female      HISTORY OF PRESENT ILLNESS:  The patient comes in today after last being seen February 25, 2016, by Dr. Jose Conley for severe degenerative disc disease at L5-S1 and moderately severe at L3-4. At that time, she was having low back pain that radiated to her left lower extremity down to her thigh with numbness. He put her in a physical therapy session, which did help. She is now doing a home exercise program and has not had as much severe pain since she is doing a home exercise program and is able to sit, stand, and walk at will with her job from home. 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 However, they are now talking about making the home workers come into the office to work. At home, she has an adjustable height desk, and she sits on a wooden stool. She is able to sit, stand, and walk at will and do her job with minimal pain. Her pain can fluctuate between 3/10 and 8/10. If she has to go back into the office, it will be an 18-mile commute, which will be difficult due to the prolonged sitting, and she will require an adjustable height desk just like she has at home if she has to go back into the office, which I think is very reasonable. According to her x-rays, she has pretty severe degenerative disc disease, especially at L5-S1. That would cause pain with prolonged sitting, standing, or walking. She denies fever and bowel and bladder dysfunction. She is a nonsmoker. Since we last saw her, she did have a pacemaker placed by Dr. Kilo Olea and is doing well with that. PHYSICAL EXAM:  Ms. Agustina Davis is a 40-year-old female. She is alert and oriented. She has a normal mood and affect. She has a full weightbearing, non-antalgic gait.   She has pain with both flexion and hyperextension of the lumbar spine and negative straight leg raise. She has 5/5 strength of the bilateral lower extremities. ASSESSENT/PLAN:  This is a patient with degenerative disc disease of the lumbar spine. We will be receiving paperwork in order to get an adjustable height desk for her. We will fill that out appropriately and fax it back in for her. She will continue her home exercise program.  We will see her back as needed. Review of systems:    Past Medical History:   Diagnosis Date    Agatston CAC score, <100 02/29/2016    ca score ZERO    Arrhythmia     PVC 10% burden; s/p partial ablation Dr Rafael Chavez 2/16    Asthma     h/o exercise induced, denies 3/2/18    Cardiac echocardiogram 01/19/2016    EF 55-60%. No RWMA. Indeterminate diastolic fx. RVSP 26 mmHg. Mild MR.  Cardiac Holter monitor study 12/21/2015    Sinus rhythm, avg HR 82 bpm (range  bpm). Numerous PVCs. A few short runs of VT.  Cardiovascular LE venous duplex 07/08/2016    No DVT bilaterally.     Cholelithiasis     on US 2008, 4/17    Chronic back pain     Dr. Govea Duty    Chronic venous insufficiency 2016    Dr Taylor Dress polyp 03/05/2018    Dr Richy Lopez; serrated polyp and divertics    Shivani Olp tenosynovitis     Dr Peace Lucero Diverticulitis 12/2017    CT proven    Dupuytren's contracture of both hands     Dr Peace Lucero FHx: heart disease     GERD (gastroesophageal reflux disease) 2/99    on UGI    History of palpitations 2005    Hyperlipidemia     calculated 10 year risk score was 1.8% (12/15); 2.2% (10/16)    Hypovitaminosis D     Myofacial pain     neck and thoracic    OA (osteoarthritis)     lumbar DDD    Obesity     peak weight 200 lbs, bmi 33.3 from 5/13; qsymia ineffective, intol contrave, declined med supervised wt loss; 24h U petar nl; IF 3/18    Osteopenia 2007    t score -1 spine, -0.2 hip Dr. Ophelia Whitfield Pericardial cyst 12/13    calcified on CT    Prediabetes 11/12    Restless legs syndrome     Tachycardia     neg eval Dr. Niharika Hagan 2004    Tension headache     Thyroid nodule 8/12    3mm; 5/13, 12/13, 11/14, 1/16 no change    Trigger finger of both hands     Dr Han Cheng     Past Surgical History:   Procedure Laterality Date    CARDIAC SURG PROCEDURE UNLIST  2004    Holter negative    CARDIAC SURG PROCEDURE UNLIST  2004    echo nl lv, ef 65%    CARDIAC SURG PROCEDURE UNLIST  02/2016    Dr Moses Love; heart ablation    COLONOSCOPY N/A 3/5/2018    HBV 2011 negative; Dr Shaw Ports 3/5/18 serrated polyp and divertics    HAND/FINGER SURGERY UNLISTED  12/2017    Dr Kilo Nichols; left thumb cmc arthroplasty    HX BUNIONECTOMY  2005    bilateral    HX CARPAL TUNNEL RELEASE  12/08    HX CHOLECYSTECTOMY  05/05/2017    robotic single site cholecystectomy Dr Michele Currie  2007    right lumbar     HX ORTHOPAEDIC      trigger finger release Dr Murray Madison    HX PACEMAKER  02/28/2017    Medtronic    VASCULAR SURGERY PROCEDURE UNLIST  3/12    venous doppler negative     Social History     Social History    Marital status:      Spouse name: N/A    Number of children: 1    Years of education: N/A     Occupational History    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      Social History Main Topics    Smoking status: Former Smoker     Packs/day: 1.00     Years: 20.00     Quit date: 1/14/1993    Smokeless tobacco: Never Used      Comment: 1994    Alcohol use 0.0 oz/week     0 Standard drinks or equivalent per week      Comment: rarely    Drug use: No    Sexual activity: Not on file     Other Topics Concern    Not on file     Social History Narrative     Family History   Problem Relation Age of Onset    Hypertension Mother     Diabetes Mother     Stroke Mother     Arthritis-osteo Mother     Cancer Mother      breast    Elevated Lipids Mother     Breast Cancer Mother     Diabetes Father     Arthritis-osteo Father     Heart Disease Father     Elevated Lipids Father     Arthritis-osteo Sister     Migraines Sister     Alcohol abuse Brother     Arthritis-osteo Brother        Physical Exam:  Visit Vitals    /76    Pulse 96    Temp 98.2 °F (36.8 °C)    Resp 18    Ht 5' 5\" (1.651 m)    Wt 205 lb (93 kg)    SpO2 97%    BMI 34.11 kg/m2     Pain Scale: 3/10          has been . reviewed and is appropriate          Diagnoses and all orders for this visit:    1. DDD (degenerative disc disease), lumbar            Follow-up Disposition:  Return if symptoms worsen or fail to improve.         We have informed May Rico to notify us for immediate appointment if she has any worsening neurogical symptoms or if an emergency situation presents, then call [phone]

## 2018-04-25 ENCOUNTER — TELEPHONE (OUTPATIENT)
Dept: INTERNAL MEDICINE CLINIC | Age: 58
End: 2018-04-25

## 2018-04-25 ENCOUNTER — DOCUMENTATION ONLY (OUTPATIENT)
Dept: ORTHOPEDIC SURGERY | Age: 58
End: 2018-04-25

## 2018-04-25 NOTE — PROGRESS NOTES
PT CAME BY MO OFFICE TO DROP OFF REASONABLE ACCOMADATIONS FORM FOR DR LEIVA TO COMPLETE. PLEASE FAX TO [phone] WHEN DONE AND PLEASE NOTIFY PT [phone] WHEN DONE AND FAXED.

## 2018-04-26 ENCOUNTER — DOCUMENTATION ONLY (OUTPATIENT)
Dept: ORTHOPEDIC SURGERY | Age: 58
End: 2018-04-26

## 2018-04-26 NOTE — TELEPHONE ENCOUNTER
Referral has been re-faxed to Dr. Gabino Rutherford office. Called and spoke with patient and told her to be expecting a call for scheduling from Dr. Gabino Rutherford office and to let us know if they do not receive the fax.

## 2018-05-28 RX ORDER — PRAMIPEXOLE DIHYDROCHLORIDE 1 MG/1
TABLET ORAL
Qty: 180 TAB | Refills: 0 | Status: SHIPPED | OUTPATIENT
Start: 2018-05-28 | End: 2018-07-24 | Stop reason: SDUPTHER

## 2018-05-29 NOTE — TELEPHONE ENCOUNTER
From: Pito Lerner  To: Cristina Banks MD  Sent: 5/29/2018 9:29 AM EDT  Subject: Medication Renewal Request    Original authorizing provider: MD Floridalma Camarillo. Alberto would like a refill of the following medications:  EPIPEN 2-PRITI 0.3 mg/0.3 mL injection Cristina Banks MD]    Preferred pharmacy: [place] [address] [id number] - [address]    Comment:  Instead of the Epipen, I would like a prescription for the much cheaper 3.0OF Adrenaclick or Epinephrine (Lelia Seeds), the generic version of Adrenaclick.

## 2018-05-30 ENCOUNTER — TELEPHONE (OUTPATIENT)
Dept: INTERNAL MEDICINE CLINIC | Age: 58
End: 2018-05-30

## 2018-05-30 DIAGNOSIS — L03.313 CELLULITIS OF CHEST WALL: Primary | ICD-10-CM

## 2018-05-30 RX ORDER — CEPHALEXIN 500 MG/1
500 CAPSULE ORAL 4 TIMES DAILY
Qty: 40 CAP | Refills: 0 | Status: SHIPPED | OUTPATIENT
Start: 2018-05-30 | End: 2018-09-28 | Stop reason: ALTCHOICE

## 2018-05-30 RX ORDER — EPINEPHRINE 0.3 MG/.3ML
0.3 INJECTION SUBCUTANEOUS
Qty: 2 SYRINGE | Refills: 5 | Status: SHIPPED | OUTPATIENT
Start: 2018-05-30 | End: 2018-05-30 | Stop reason: SDUPTHER

## 2018-05-30 RX ORDER — EPINEPHRINE 0.3 MG/.3ML
0.3 INJECTION SUBCUTANEOUS
Qty: 2 SYRINGE | Refills: 5 | Status: SHIPPED | OUTPATIENT
Start: 2018-05-30 | End: 2020-03-30

## 2018-05-30 NOTE — TELEPHONE ENCOUNTER
Hi!  Something bit me last Friday afternoon (5/25) while I was mowing the grass.  Two bites near the center of my collarbone on the right side.  The redness around the bites has increased and there is now a line of redness from the bites that moves down towards near my armpit.  Should I wait a few more days to see if it starts to go away or should I make an appointment to have it checked out?  I have put Calamine lotion on it which didn't appear to do any good and have taken Benadryl.  It is a little itchy but not driving me crazy. Cory Howard you! Yasmin Guerrero  [phone]          She sent pictures with her email, it looks pretty red/inflamed if you want to see it

## 2018-05-30 NOTE — TELEPHONE ENCOUNTER
MEDICATION PENDING PER MESSAGE BELOW. Requested Prescriptions     Pending Prescriptions Disp Refills    EPINEPHrine (EPIPEN 2-PRITI) 0.3 mg/0.3 mL injection 2 Syringe 5     Si.3 mL by IntraMUSCular route once as needed for up to 1 dose. Signed Prescriptions Disp Refills    EPINEPHrine (EPIPEN 2-PRITI) 0.3 mg/0.3 mL injection 2 Syringe 5     Si.3 mL by IntraMUSCular route once as needed for up to 1 dose.      Authorizing Provider: Claribel Weathers

## 2018-05-30 NOTE — TELEPHONE ENCOUNTER
Pt says wrong pen was called in. Says she was wanting the generic Adenaclick and she needs it to go to [facility]. [facility] is out of the epipen and does not carry the generic.

## 2018-07-11 ENCOUNTER — OFFICE VISIT (OUTPATIENT)
Dept: CARDIOLOGY CLINIC | Age: 58
End: 2018-07-11

## 2018-07-11 DIAGNOSIS — Z98.890 S/P ABLATION OPERATION FOR ARRHYTHMIA: Primary | ICD-10-CM

## 2018-07-11 DIAGNOSIS — Z86.79 S/P ABLATION OPERATION FOR ARRHYTHMIA: Primary | ICD-10-CM

## 2018-07-11 DIAGNOSIS — Z95.0 PACEMAKER: ICD-10-CM

## 2018-07-16 NOTE — PROGRESS NOTES
I have personally seen and evaluated the device findings. Interrogation reviewed and I agree with assessment.     Oralee Patience

## 2018-07-25 RX ORDER — PRAMIPEXOLE DIHYDROCHLORIDE 1 MG/1
TABLET ORAL
Qty: 180 TAB | Refills: 0 | Status: SHIPPED | OUTPATIENT
Start: 2018-07-25 | End: 2018-09-26 | Stop reason: SDUPTHER

## 2018-08-31 ENCOUNTER — TELEPHONE (OUTPATIENT)
Dept: INTERNAL MEDICINE CLINIC | Age: 58
End: 2018-08-31

## 2018-08-31 DIAGNOSIS — B35.9 TINEA: Primary | ICD-10-CM

## 2018-08-31 NOTE — TELEPHONE ENCOUNTER
Pt said she has a rash in her inner thigh and she thinks she was bite by a bug a week ago, not warm, dark red no head, no lines on it, she used some cream not going away, RD

## 2018-08-31 NOTE — TELEPHONE ENCOUNTER
Does it hurt? It's reddish but not warm. Is it enlarging? Any systemic sx? Does she have any idea what bug?   Tick maybe?  hard to paula cellulitis without seeing, need more details

## 2018-09-04 NOTE — TELEPHONE ENCOUNTER
Spoke with pt in depth, it sounds like she has a skin yeast infection between 2 folds of skin? She said its where her groin is where her underwear ends, its red, raw, and she was out in the yard sweating and cutting bushes, etc. Now its red and raw on the other groin. It has not enlarged, no systemic sx, no draining.  I suggested trying A&D ointment and/or baby rash cream. Can she try Diflucan?     [facility]ann [facility] on file is the correct pharmacy

## 2018-09-06 RX ORDER — ECONAZOLE NITRATE 10 MG/G
CREAM TOPICAL 2 TIMES DAILY
Qty: 30 G | Refills: 1 | Status: SHIPPED | OUTPATIENT
Start: 2018-09-06 | End: 2018-11-16

## 2018-09-21 ENCOUNTER — HOSPITAL ENCOUNTER (OUTPATIENT)
Dept: LAB | Age: 58
Discharge: HOME | End: 2018-09-21
Payer: COMMERCIAL

## 2018-09-21 ENCOUNTER — APPOINTMENT (OUTPATIENT)
Dept: INTERNAL MEDICINE CLINIC | Age: 58
End: 2018-09-21

## 2018-09-21 DIAGNOSIS — E78.5 HYPERLIPIDEMIA, UNSPECIFIED HYPERLIPIDEMIA TYPE: ICD-10-CM

## 2018-09-21 DIAGNOSIS — R73.03 PREDIABETES: ICD-10-CM

## 2018-09-21 LAB
ANION GAP SERPL CALC-SCNC: 8 MMOL/L (ref 3–18)
BUN SERPL-MCNC: 19 MG/DL (ref 7–18)
BUN/CREAT SERPL: 21 (ref 12–20)
CALCIUM SERPL-MCNC: 9.1 MG/DL (ref 8.5–10.1)
CHLORIDE SERPL-SCNC: 105 MMOL/L (ref 100–108)
CHOLEST SERPL-MCNC: 281 MG/DL
CO2 SERPL-SCNC: 28 MMOL/L (ref 21–32)
CREAT SERPL-MCNC: 0.92 MG/DL (ref 0.6–1.3)
GLUCOSE SERPL-MCNC: 106 MG/DL (ref 74–99)
HBA1C MFR BLD: 5.8 % (ref 4.2–5.6)
HDLC SERPL-MCNC: 77 MG/DL (ref 40–60)
HDLC SERPL: 3.6 {RATIO} (ref 0–5)
LDLC SERPL CALC-MCNC: 147.6 MG/DL (ref 0–100)
LIPID PROFILE,FLP: ABNORMAL
POTASSIUM SERPL-SCNC: 4 MMOL/L (ref 3.5–5.5)
SODIUM SERPL-SCNC: 141 MMOL/L (ref 136–145)
TRIGL SERPL-MCNC: 282 MG/DL (ref ?–150)
VLDLC SERPL CALC-MCNC: 56.4 MG/DL

## 2018-09-21 PROCEDURE — 83036 HEMOGLOBIN GLYCOSYLATED A1C: CPT | Performed by: INTERNAL MEDICINE

## 2018-09-21 PROCEDURE — 80061 LIPID PANEL: CPT | Performed by: INTERNAL MEDICINE

## 2018-09-21 PROCEDURE — 36415 COLL VENOUS BLD VENIPUNCTURE: CPT | Performed by: INTERNAL MEDICINE

## 2018-09-21 PROCEDURE — 80048 BASIC METABOLIC PNL TOTAL CA: CPT | Performed by: INTERNAL MEDICINE

## 2018-09-26 RX ORDER — PRAMIPEXOLE DIHYDROCHLORIDE 1 MG/1
TABLET ORAL
Qty: 180 TAB | Refills: 0 | Status: SHIPPED | OUTPATIENT
Start: 2018-09-26 | End: 2018-12-06 | Stop reason: SDUPTHER

## 2018-09-26 NOTE — PROGRESS NOTES
62 y.o. WHITE OR  female who presents for f/u No other cardiovascular complaints. Trying to be active but really no set exercise. No issues after the pacemaker No  complaints. No gi complaints She ended up seeing Dr Anabel Garcia for the borderline low gfr which was attributed to nsaid and diuretic use, it has actually improved Vitals 9/28/2018 4/16/2018 4/5/2018 3/29/2018 Weight 202 lb 205 lb 205 lb 204 lb She wants to see a surgeon for her lipoma Lastly, wants refill of the tramadol which she uses prn for the low back pain IF 3/18 Past Medical History:  
Diagnosis Date  Agatston CAC score, <100 02/29/2016  
 ca score ZERO  Arrhythmia PVC 10% burden; s/p partial ablation Dr Deep Cosby 2/16  Asthma   
 h/o exercise induced, denies 3/2/18  Cardiac echocardiogram 01/19/2016 EF 55-60%. No RWMA. Indeterminate diastolic fx. RVSP 26 mmHg. Mild MR.  Cardiac Holter monitor study 12/21/2015 Sinus rhythm, avg HR 82 bpm (range  bpm). Numerous PVCs. A few short runs of VT.  Cardiovascular LE venous duplex 07/08/2016 No DVT bilaterally.  Cholelithiasis   
 on  2008, 4/17  Chronic back pain Dr. Suri Pradhan  Chronic venous insufficiency 2016 Dr Jessica Jaquez  Colon polyp 03/05/2018 Dr Mary Whitlock; serrated polyp and divertics  De Quervain's tenosynovitis Dr Patricia Duff  Decreased GFR   
 saw Dr Anabel Garcia  Diverticulitis 12/2017 CT proven  Dupuytren's contracture of both hands Dr Patricia Duff  FHx: heart disease  GERD (gastroesophageal reflux disease) 2/99  
 on UGI  History of palpitations 2005  Hyperlipidemia   
 calculated 10 year risk score was 1.8% (12/15); 2.2% (10/16); 2.3% (9/18)  Hypovitaminosis D  Myofacial pain   
 neck and thoracic  OA (osteoarthritis)   
 lumbar DDD  Obesity   
 peak weight 200 lbs, bmi 33.3 from 5/13; qsymia ineffective, intol contrave, declined med supervised wt loss; 24h U petar nl; IF 3/18 start weight 204 lbs  Osteopenia 2007  
 t score -1 spine, -0.2 hip Dr. Montero Gravray  Pericardial cyst 12/13  
 calcified on CT  
 Prediabetes 11/12  Restless legs syndrome  Tachycardia   
 neg eval Dr. Eris Mendoza 2004  Tension headache  Thyroid nodule 8/12  
 3mm; 5/13, 12/13, 11/14, 1/16 no change  Trigger finger of both hands Dr Karina Leroy Past Surgical History:  
Procedure Laterality Date  CARDIAC SURG PROCEDURE UNLIST  2004 Holter negative  CARDIAC SURG PROCEDURE UNLIST  2004  
 echo nl lv, ef 65%  CARDIAC SURG PROCEDURE UNLIST  02/2016 Dr Chava Aviles; heart ablation  COLONOSCOPY N/A 3/5/2018 HBV 2011 negative; Dr Kaleigh Cerda 3/5/18 serrated polyp and divertics  HAND/FINGER SURGERY UNLISTED  12/2017 Dr Danilo Chavez; left thumb cmc arthroplasty  HX BUNIONECTOMY  2005  
 bilateral  
 HX CARPAL TUNNEL RELEASE  12/08  HX CHOLECYSTECTOMY  05/05/2017  
 robotic single site cholecystectomy Dr Ren Webster  HX CYST REMOVAL  2007  
 right lumbar  HX LIPOMA RESECTION    
 x2 right low back  HX ORTHOPAEDIC    
 trigger finger release Dr Ivis Harrison  HX ORTHOPAEDIC  2018 Dr Danilo Chavez finger surgery  HX PACEMAKER  02/28/2017 [employer]  VASCULAR SURGERY PROCEDURE UNLIST  3/12  
 venous doppler negative Social History Social History  Marital status:  Spouse name: N/A  
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   
 
Social History Main Topics  Smoking status: Former Smoker Packs/day: 1.00 Years: 20.00 Quit date: 1/14/1993  Smokeless tobacco: Never Used Comment: 1994  Alcohol use 0.0 oz/week  
  0 Standard drinks or equivalent per week Comment: rarely  Drug use: No  
 Sexual activity: Not on file Other Topics Concern  Not on file Social History Narrative Current Outpatient Prescriptions Medication Sig  
 traMADol (ULTRAM) 50 mg tablet Take 1 Tab by mouth every six (6) hours as needed for Pain.  pramipexole (MIRAPEX) 1 mg tablet TAKE 1 TABLET BY MOUTH THREE TIMES DAILY  EPINEPHrine (EPIPEN 2-PRITI) 0.3 mg/0.3 mL injection 0.3 mL by IntraMUSCular route once as needed for up to 1 dose.  calcium-cholecalciferol, D3, (CALTRATE 600+D) tablet Take 1 Tab by mouth daily.  fish oil-dha-epa 1,200-144-216 mg cap Take 1 Cap by mouth daily.  MULTIVITS-MIN/IRON/FA/LUTEIN (CENTRUM SILVER WOMEN PO) Take 1 Tab by mouth daily.  econazole nitrate (SPECTAZOLE) 1 % topical cream Apply  to affected area two (2) times a day. (Patient taking differently: Apply  to affected area two (2) times a day. Indications: not taking) No current facility-administered medications for this visit. Allergies Allergen Reactions  Adhesive Tape-Silicones Contact Dermatitis  Bee Sting [Sting, Bee] Anaphylaxis  Benzoyl Peroxide Rash  Betadine [Povidone-Iodine] Rash  Chlorhexidine Towelette Rash  Codeine Nausea and Vomiting  Contrave [Naltrexone-Bupropion] Other (comments) Vision changes REVIEW OF SYSTEMS: mammo 4/17, gyn 3/17, colo 3/18 Dr Berenice Allen, [address] 12/15 Ophtho  no vision change or eye pain Oral  no mouth pain, tongue or tooth problems Ears  no hearing loss, ear pain, fullness, no swallowing problems Cardiac  no CP, PND, orthopnea Chest  no breast masses Resp  no wheezing, chronic coughing, dyspnea GI  no heartburn, nausea, vomiting, change in bowel habits, bleeding, hemorrhoids Urinary  no dysuria, hematuria, flank pain, urgency, frequency Genitals  no genital lesions, discharge, masses, ulceration, warts Constitutional  no wt loss, night sweats, unexplained fevers Visit Vitals  /78  Pulse 72  Temp 98.1 °F (36.7 °C) (Oral)  Resp 14  
 Ht 5' 5\" (1.651 m)  Wt 202 lb (91.6 kg)  SpO2 99%  BMI 33.61 kg/m2 Affect is appropriate. Mood stable No apparent distress HEENT --Anicteric sclerae, tympanic membranes normal,  ear canals normal. 
PERRL, EOMI, conjunctiva and lids normal.  Disks were sharp Sinuses were nontender, turbinates normal, hearing normal.  Oropharynx without 
erythema, normal tongue, oral mucosa and tonsils. No thyromegaly, JVD, or bruits. Lungs --Clear to auscultation, normal percussion. Heart --Regular rate and rhythm, no murmurs, rubs, gallops, or clicks. Chest wall --Nontender to palpation. PMI normal. 
Abdomen -- Soft and nontender, no hepatosplenomegaly or masses. Ext without c/c/e 
 
LABS From 3/12 showed   gluc 120, cr 0.72, gfr 97,                                                                                   wbc 6.3, hb 13.0, plt 230, tsh 0.69, lyme neg, ua neg From 11/12 showed gluc 105, cr 1.04, gfr 62,     hba1c 6.0,              chol 246, tg 306, hdl 61, ldl-c 124,                 tsh 1.21 From 5/13 showed                            chol 302. tg 281, hdl 60, ldl-c 186 From 11/13 showed gluc 105, cr 0.95, gfr 69,    hba1c 6.1, ldl-p 903, chol 247, tg 134, hdl 74, ldl-c 146 From 11/13 showed gluc 80,   cr 0.92, gfr 72,  alt 15,               chol 146, tg 85,   hdl 78, ldl-c 51,   wbc 5.9, hb 13.9, plt 238 From 11/14 showed gluc 96,   cr 0.92, gfr>60, alt<5,  hba1c 6.1,   chol 233, tg 174, hdl 75, ldl-c 123, wbc 5.7, hb 13.3, plt 204,           ua neg From 2/15 showed   gluc 94,   cr 0.98, gfr 59,  alt 7,   hba1c 5.8,              wbc 7.6, hb 13.7, plt 228, tsh 0.62,          ua neg From 6/15 showed        hba1c 5.8 From 12/15 showed gluc 96,   cr 0.94, gfr>60, alt 31,    chol 274, tg 209, hdl 91, ldl-c 141 From 6/16 showed   gluc 102, cr 0.89, gfr>60, alt 21, hba1c 6.1,   chol 319, tg 314, hdl 67, ldl-c 189, wbc 6.3, hb 12.5, plt 188, tsh 1.49, ft4 1.1,     tt3 86, tpo ab neg From 7/16 showed   gluc 102, cr 1.44, gfr 38,              24h U petar 18 
 From 9/16 showed                           ua neg From 10/16 showed gluc 103, cr 1.05, gfr 54,  alt 23, hba1c 5.4,   chol 292, tg 292, hdl 67, ldl-c 167 From 12/17 showed gluc 119, cr 1.01, gfr 57,  alt 33,               wbc 7.0, hb 13.5, plt 257, lip 170 From 3/18 showed   gluc 106, cr 0.98, gfr 59,  alt 16, hba1c 6.0,   chol 284, tg 259, hdl 80, ldl-c 152, wbc 8.3, hb 14.4, plt 216 Results for orders placed or performed during the hospital encounter of 09/21/18 METABOLIC PANEL, BASIC Result Value Ref Range Sodium 141 136 - 145 mmol/L Potassium 4.0 3.5 - 5.5 mmol/L Chloride 105 100 - 108 mmol/L  
 CO2 28 21 - 32 mmol/L Anion gap 8 3.0 - 18 mmol/L Glucose 106 (H) 74 - 99 mg/dL BUN 19 (H) 7.0 - 18 MG/DL Creatinine 0.92 0.6 - 1.3 MG/DL  
 BUN/Creatinine ratio 21 (H) 12 - 20 GFR est AA >60 >60 ml/min/1.73m2 GFR est non-AA >60 >60 ml/min/1.73m2 Calcium 9.1 8.5 - 10.1 MG/DL  
LIPID PANEL Result Value Ref Range LIPID PROFILE Cholesterol, total 281 (H) <200 MG/DL Triglyceride 282 (H) <150 MG/DL  
 HDL Cholesterol 77 (H) 40 - 60 MG/DL  
 LDL, calculated 147.6 (H) 0 - 100 MG/DL VLDL, calculated 56.4 MG/DL  
 CHOL/HDL Ratio 3.6 0 - 5.0 HEMOGLOBIN A1C W/O EAG Result Value Ref Range Hemoglobin A1c 5.8 (H) 4.2 - 5.6 % Calculated 10 year risk score was 2.3% Patient Active Problem List  
Diagnosis Code  Hyperlipidemia E78.5  Prediabetes R73.03  
 Gastroesophageal reflux disease without esophagitis K21.9  DDD (degenerative disc disease), lumbar M51.36  
 S/P ablation operation for arrhythmia Z98.890, Z86.79  
 Obesity (BMI 30-39. 9) E66.9  Varicose veins of both lower extremities I83.93  
 Pacemaker Z95.0  Seasonal allergic rhinitis J30.1 Assessment and plan: 1. Ortho. Per her specialist 
2. Obesity. She will try the IF again 3. GERD. PPI tx and avoidance measures prn 
4. Osteopenia. Ca/d, weight bearing exercise as tolerated 5. Hyperlipidemia. Lifestyle and dietary measures 6. H/O thyroid nodule. Stable nodule on f/u.  
7. Prediabetes. Continue current approaches and attempts at wt loss 8. S/P ablation and pacemaker placement. F/U Dr Triplett Daily 9. Polyps and diverticulosis. Fiber, colo 2023 10. Renal.  Follow 11. Lipoma. Send to Dr Alfred Huang RTC 3/19 Above conditions discussed at length and patient vocalized understanding. All questions answered to patient satisfaction

## 2018-09-28 ENCOUNTER — OFFICE VISIT (OUTPATIENT)
Dept: INTERNAL MEDICINE CLINIC | Age: 58
End: 2018-09-28

## 2018-09-28 ENCOUNTER — TELEPHONE (OUTPATIENT)
Dept: INTERNAL MEDICINE CLINIC | Age: 58
End: 2018-09-28

## 2018-09-28 VITALS
OXYGEN SATURATION: 99 % | HEART RATE: 72 BPM | SYSTOLIC BLOOD PRESSURE: 122 MMHG | WEIGHT: 202 LBS | DIASTOLIC BLOOD PRESSURE: 78 MMHG | BODY MASS INDEX: 33.66 KG/M2 | TEMPERATURE: 98.1 F | RESPIRATION RATE: 14 BRPM | HEIGHT: 65 IN

## 2018-09-28 DIAGNOSIS — E78.5 HYPERLIPIDEMIA, UNSPECIFIED HYPERLIPIDEMIA TYPE: ICD-10-CM

## 2018-09-28 DIAGNOSIS — I83.93 VARICOSE VEINS OF BOTH LOWER EXTREMITIES: ICD-10-CM

## 2018-09-28 DIAGNOSIS — D17.1 LIPOMA OF TORSO: Primary | ICD-10-CM

## 2018-09-28 DIAGNOSIS — M51.36 DDD (DEGENERATIVE DISC DISEASE), LUMBAR: ICD-10-CM

## 2018-09-28 DIAGNOSIS — R73.03 PREDIABETES: ICD-10-CM

## 2018-09-28 DIAGNOSIS — E66.9 OBESITY (BMI 30-39.9): ICD-10-CM

## 2018-09-28 RX ORDER — TRAMADOL HYDROCHLORIDE 50 MG/1
50 TABLET ORAL
Qty: 40 TAB | Refills: 0 | OUTPATIENT
Start: 2018-09-28 | End: 2019-04-11 | Stop reason: ALTCHOICE

## 2018-09-28 NOTE — MR AVS SNAPSHOT
[facility] 
 
 
 [address], [address] [facility] 
[phone] Patient: Brenna Mock MRN: [id number] :1960 Visit Information Date & Time Provider Department Dept. Phone Encounter #  
 2018  9:40 AM Eulalio Cronin MD [facility] [phone] [id number] Your Appointments 10/17/2018  1:20 PM  
PROCEDURE with Pacer [facility] ([address] [place] [address]) Appt Note: medronic device check per Dr. Erick Kearns. ; [address] [address]  
[phone] [address] [facility]  [address] [address]  
  
    
 3/22/2019  8:05 AM  
LAB with [facility] NURSE VISIT [facility] ([address] [place] [address]) Appt Note: lab  
 [address], [address] [facility] [address]  
  
   
 [address], [address]  
  
    
 3/29/2019  8:00 AM  
Office Visit with Eulalio Cronin MD  
[facility] [address]) Appt Note: 6 month f/u  
 [address] [facility], [address]  
  
   
 [address], [address] 2019  9:00 AM  
Follow Up with Preet Mock MD  
[facility] ([address] [place] [address]) Appt Note: 1 yr f/u with EKG  
 [address] [address]  
[phone] [address] [facility] [address] Upcoming Health Maintenance Date Due Shingrix Vaccine Age 50> (1 of 2) 2010 Influenza Age 5 to Adult 2018 PAP AKA CERVICAL CYTOLOGY 3/23/2020 BREAST CANCER SCRN MAMMOGRAM 2020 COLONOSCOPY 3/5/2023 DTaP/Tdap/Td series (2 - Td) 3/23/2027 Allergies as of 2018  Review Complete On: 2018 By: Last Scruggs LPN Severity Noted Reaction Type Reactions Adhesive Tape-silicones  62/07/5347    Contact Dermatitis Bee Sting [Sting, Bee]    Anaphylaxis Benzoyl Peroxide    Rash Betadine [Povidone-iodine]  02/08/2016    Rash Chlorhexidine Towelette  11/11/2016    Rash Codeine    Nausea and Vomiting Contrave [Naltrexone-bupropion]  06/19/2015    Other (comments) Vision changes Current Immunizations  Reviewed on 8/13/2012 Name Date  
 TD Vaccine 10/5/2010 Tdap 3/23/2017 Zoster Vaccine, Live 11/27/2013 Not reviewed this visit Vitals BP Pulse Temp Resp Height(growth percentile) Weight(growth percentile) (!) 136/94 72 98.1 °F (36.7 °C) (Oral) 14 5' 5\" (1.651 m) 202 lb (91.6 kg) SpO2 BMI OB Status Smoking Status 99% 33.61 kg/m2 Postmenopausal Former Smoker Vitals History BMI and BSA Data Body Mass Index Body Surface Area  
 33.61 kg/m 2 2.05 m 2 Preferred Pharmacy Pharmacy Name Phone [id number] - [address] [facility], [address] [place] [address] [phone] Your Updated Medication List  
  
   
This list is accurate as of 9/28/18 10:36 AM.  Always use your most recent med list.  
  
  
  
  
 calcium-cholecalciferol (D3) tablet Commonly known as:  CALTRATE 600+D Take 1 Tab by mouth daily. CENTRUM SILVER WOMEN PO Take 1 Tab by mouth daily. econazole nitrate 1 % topical cream  
Commonly known as:  Tello Kristine Apply  to affected area two (2) times a day. EPINEPHrine 0.3 mg/0.3 mL injection Commonly known as:  EPIPEN 2-PRITI  
0.3 mL by IntraMUSCular route once as needed for up to 1 dose. fish oil-dha-epa 1,200-144-216 mg Cap Take 1 Cap by mouth daily. pramipexole 1 mg tablet Commonly known as:  MIRAPEX TAKE 1 TABLET BY MOUTH THREE TIMES DAILY Introducing [facility]! Dear Curt Perez: Thank you for requesting a [employer] account.   Our records indicate that you already have an active [employer] account. You can access your account anytime at [email or web address] Did you know that you can access your hospital and ER discharge instructions at any time in [employer]? You can also review all of your test results from your hospital stay or ER visit. Additional Information If you have questions, please visit the Frequently Asked Questions section of the [employer] website at [email or web address] Remember, [employer] is NOT to be used for urgent needs. For medical emergencies, dial 911. Now available from your iPhone and Android! Please provide this summary of care documentation to your next provider. Your primary care clinician is listed as Carlos Mac. If you have any questions after today's visit, please call [phone].

## 2018-09-28 NOTE — PROGRESS NOTES
1. Have you been to the ER, urgent care clinic or hospitalized since your last visit? NO.  
 
2. Have you seen or consulted any other health care providers outside of the [address] [place] [address] since your last visit (Include any pap smears or colon screening)? NO Chief Complaint Patient presents with  Cholesterol Problem 6 month follow up with labs

## 2018-10-01 ENCOUNTER — DOCUMENTATION ONLY (OUTPATIENT)
Dept: INTERNAL MEDICINE CLINIC | Age: 58
End: 2018-10-01

## 2018-10-01 NOTE — PROGRESS NOTES
PA for Tramadol denied. Insurance reason: Use of this medication in a patient who is treatment naive due to not receiving at least 7 days of immediate release or extended release opioid therapy in the last 180 days does not establish medical necessity for this drug. Sent copy to pharmacy and provider.

## 2018-10-17 ENCOUNTER — OFFICE VISIT (OUTPATIENT)
Dept: CARDIOLOGY CLINIC | Age: 58
End: 2018-10-17

## 2018-10-17 DIAGNOSIS — Z98.890 S/P ABLATION OPERATION FOR ARRHYTHMIA: Primary | ICD-10-CM

## 2018-10-17 DIAGNOSIS — Z86.79 S/P ABLATION OPERATION FOR ARRHYTHMIA: Primary | ICD-10-CM

## 2018-10-17 DIAGNOSIS — Z95.0 PACEMAKER: ICD-10-CM

## 2018-10-18 NOTE — PROGRESS NOTES
I have personally seen and evaluated the device findings. Interrogation reviewed and I agree with assessment.     Isabella Mccann

## 2018-11-09 ENCOUNTER — TELEPHONE (OUTPATIENT)
Dept: ORTHOPEDIC SURGERY | Age: 58
End: 2018-11-09

## 2018-11-09 ENCOUNTER — OFFICE VISIT (OUTPATIENT)
Dept: ORTHOPEDIC SURGERY | Age: 58
End: 2018-11-09

## 2018-11-09 VITALS
BODY MASS INDEX: 33.69 KG/M2 | RESPIRATION RATE: 16 BRPM | HEIGHT: 65 IN | DIASTOLIC BLOOD PRESSURE: 73 MMHG | SYSTOLIC BLOOD PRESSURE: 131 MMHG | OXYGEN SATURATION: 99 % | WEIGHT: 202.2 LBS | HEART RATE: 75 BPM | TEMPERATURE: 97.4 F

## 2018-11-09 DIAGNOSIS — M70.71 ISCHIAL BURSITIS OF RIGHT SIDE: ICD-10-CM

## 2018-11-09 DIAGNOSIS — M25.551 RIGHT HIP PAIN: Primary | ICD-10-CM

## 2018-11-09 DIAGNOSIS — M70.61 GREATER TROCHANTERIC BURSITIS OF RIGHT HIP: ICD-10-CM

## 2018-11-09 RX ORDER — BETAMETHASONE SODIUM PHOSPHATE AND BETAMETHASONE ACETATE 3; 3 MG/ML; MG/ML
6 INJECTION, SUSPENSION INTRA-ARTICULAR; INTRALESIONAL; INTRAMUSCULAR; SOFT TISSUE ONCE
Qty: 1 ML | Refills: 0
Start: 2018-11-09 | End: 2018-11-09

## 2018-11-09 NOTE — TELEPHONE ENCOUNTER
Patient was leaving her appt with Dr. Jonathan Aguero at the [facility] location today and asked that all of her x-rays be burned to a disc. Patient can be reached at [phone] when available for pick-up.

## 2018-11-09 NOTE — PROGRESS NOTES
Patient: Romelia Salinas                MRN: [id number]       SSN: [id number] YOB: 1960        AGE: 62 y.o. SEX: female Body mass index is 33.65 kg/m². PCP: Ree Whitten MD 
11/09/18 HISTORY: Aj Laguna returns in follow up with right hip pain, some low back pain, and ischial bursitis as well. She had a history of trochanteric bursitis, and we treated her over one year ago. She has a standing desk. When she sits for too long, she can have some back pain. She has had no radiculopathy. There is not much in the way of groin pain. It does hurt her to roll over on the right hip at night. She has had a pacemaker, apparently, for bradycardia. She denies any shortness of breath or chest pain and otherwise is feeling well. She has no difficulties with shoes or socks. PHYSICAL EXAMINATION:  On examination today, she is a pleasant lady. She is just, perhaps, slightly anxious. Otherwise, her affect is normal.  There is no scleral icterus. There is no JVD. The hips rotate nicely. There is a little tenderness of the ischial tuberosity. She is tender over the greater trochanter. She has well-preserved range of motion in the hip and negative signs for impingement regarding the hip itself. Tib/ant and EHL are 5/5. Sensation is normal to L4-5 as well. The calf is nontender. There is no cyanosis, peripheral edema, or clubbing. The skin is normal.  Gabrielle's sign is negative. RADIOGRAPHS:  Review of her x-rays reveals just mild degenerative changes really of both hips. There is a little bit of enthesopathy noted on x-ray over the trochanters themselves. PROCEDURE:  Under aseptic conditions and after informed, written consent with a time out, the right trochanteric bursa was injected with 1 cc of the Celestone preparation, i.e. 6 mg, which was well tolerated. PLAN:  She will return to see us in the not too distant future for followup assessment. REVIEW OF SYSTEMS:   
 
CON: negative for weight loss, fever EYE: negative for double vision ENT: negative for hoarseness RS:   negative for Tb 
GI:    negative for blood in stool :  negative for blood in urine Other systems reviewed and noted below. Past Medical History:  
Diagnosis Date  Agatston CAC score, <100 02/29/2016  
 ca score ZERO  Arrhythmia PVC 10% burden; s/p partial ablation Dr Angus Ward 2/16  Asthma   
 h/o exercise induced, denies 3/2/18  Cardiac echocardiogram 01/19/2016 EF 55-60%. No RWMA. Indeterminate diastolic fx. RVSP 26 mmHg. Mild MR.  Cardiac Holter monitor study 12/21/2015 Sinus rhythm, avg HR 82 bpm (range  bpm). Numerous PVCs. A few short runs of VT.  Cardiovascular LE venous duplex 07/08/2016 No DVT bilaterally.  Cholelithiasis   
 on US 2008, 4/17  Chronic back pain Dr. Lea Closs  Chronic venous insufficiency 2016 Dr Mccarthy Pass  Colon polyp 03/05/2018 Dr Tali Torres; serrated polyp and divertics  De Quervain's tenosynovitis Dr Dinora Gaffney  Decreased GFR   
 saw Dr Greene Query  Diverticulitis 12/2017 CT proven  Dupuytren's contracture of both hands Dr Dinora Gaffney  FHx: heart disease  GERD (gastroesophageal reflux disease) 2/99  
 on UGI  History of palpitations 2005  Hyperlipidemia   
 calculated 10 year risk score was 1.8% (12/15); 2.2% (10/16); 2.3% (9/18)  Hypovitaminosis D  Myofacial pain   
 neck and thoracic  OA (osteoarthritis)   
 lumbar DDD  Obesity   
 peak weight 200 lbs, bmi 33.3 from 5/13; qsymia ineffective, intol contrave, declined med supervised wt loss; 24h U petar nl; IF 3/18 start weight 204 lbs  Osteopenia 2007  
 t score -1 spine, -0.2 hip Dr. Aswhini Deluca  Pericardial cyst 12/13  
 calcified on CT  
 Prediabetes 11/12  Restless legs syndrome  Tachycardia   
 neg eval Dr. Arian Dodson 2004  Tension headache  Thyroid nodule 8/12 3mm; 5/13, 12/13, 11/14, 1/16 no change  Trigger finger of both hands Dr Kaitlyn Sherwood Family History Problem Relation Age of Onset  Hypertension Mother  Diabetes Mother  Stroke Mother Abhishek Renteria Arthritis-osteo Mother  Cancer Mother   
     breast  
 Elevated Lipids Mother  Breast Cancer Mother  Diabetes Father  Arthritis-osteo Father  Heart Disease Father  Elevated Lipids Father  Arthritis-osteo Sister  Migraines Sister  Alcohol abuse Brother  Arthritis-osteo Brother Current Outpatient Medications Medication Sig Dispense Refill  traMADol (ULTRAM) 50 mg tablet Take 1 Tab by mouth every six (6) hours as needed for Pain. 40 Tab 0  pramipexole (MIRAPEX) 1 mg tablet TAKE 1 TABLET BY MOUTH THREE TIMES DAILY 180 Tab 0  
 econazole nitrate (SPECTAZOLE) 1 % topical cream Apply  to affected area two (2) times a day. (Patient taking differently: Apply  to affected area two (2) times a day. Indications: not taking) 30 g 1  
 EPINEPHrine (EPIPEN 2-PRITI) 0.3 mg/0.3 mL injection 0.3 mL by IntraMUSCular route once as needed for up to 1 dose. 2 Syringe 5  
 calcium-cholecalciferol, D3, (CALTRATE 600+D) tablet Take 1 Tab by mouth daily.  fish oil-dha-epa 1,200-144-216 mg cap Take 1 Cap by mouth daily.  MULTIVITS-MIN/IRON/FA/LUTEIN (CENTRUM SILVER WOMEN PO) Take 1 Tab by mouth daily. Allergies Allergen Reactions  Adhesive Tape-Silicones Contact Dermatitis  Bee Sting [Sting, Bee] Anaphylaxis  Benzoyl Peroxide Rash  Betadine [Povidone-Iodine] Rash  Chlorhexidine Towelette Rash  Codeine Nausea and Vomiting  Contrave [Naltrexone-Bupropion] Other (comments) Vision changes Past Surgical History:  
Procedure Laterality Date  CARDIAC SURG PROCEDURE UNLIST  2004 Holter negative  CARDIAC SURG PROCEDURE UNLIST  2004  
 echo nl lv, ef 65%  CARDIAC SURG PROCEDURE UNLIST  02/2016 Dr Jean-Claude Krueger; heart ablation  HAND/FINGER SURGERY UNLISTED  2017 Dr Megan Amos; left thumb cmc arthroplasty  HX BUNIONECTOMY  2005  
 bilateral  
 HX CARPAL TUNNEL RELEASE    HX CHOLECYSTECTOMY  2017  
 robotic single site cholecystectomy Dr Allen So  HX CYST REMOVAL  2007  
 right lumbar  HX LIPOMA RESECTION    
 x2 right low back  HX ORTHOPAEDIC    
 trigger finger release Dr Mary Larson  HX ORTHOPAEDIC  2018 Dr Megan Amos finger surgery  HX PACEMAKER  2017 [employer]  VASCULAR SURGERY PROCEDURE UNLIST  3/12  
 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  
Tobacco Use  Smoking status: Former Smoker Packs/day: 1.00 Years: 20.00 Pack years: 20.00 Last attempt to quit: 1993 Years since quittin.8  Smokeless tobacco: Never Used  Tobacco comment:  Substance and Sexual Activity  Alcohol use: Yes Alcohol/week: 0.0 oz  
  Comment: rarely  Drug use: No  
 Sexual activity: Not on file Other Topics Concern  Not on file Social History Narrative  Not on file Visit Vitals /73 Pulse 75 Temp 97.4 °F (36.3 °C) (Oral) Resp 16 Ht 5' 5\" (1.651 m) Wt 202 lb 3.2 oz (91.7 kg) SpO2 99% BMI 33.65 kg/m² PHYSICAL EXAMINATION: 
GENERAL: Alert and oriented x3, in no acute distress, well-developed, well-nourished, afebrile. HEART: No JVD. EYES: No scleral icterus NECK: No significant lymphadenopathy LUNGS: No respiratory compromise or indrawing ABDOMEN: Soft, non-tender, non-distended. Electronically signed by:  Angella Will MD

## 2018-11-09 NOTE — TELEPHONE ENCOUNTER
Patient notified disc is ready at the [facility] office. Patient verbalized understanding.

## 2018-11-13 ENCOUNTER — HOSPITAL ENCOUNTER (OUTPATIENT)
Dept: GENERAL RADIOLOGY | Age: 58
Discharge: HOME | End: 2018-11-13
Payer: COMMERCIAL

## 2018-11-13 ENCOUNTER — OFFICE VISIT (OUTPATIENT)
Dept: SURGERY | Age: 58
End: 2018-11-13

## 2018-11-13 ENCOUNTER — HOSPITAL ENCOUNTER (OUTPATIENT)
Dept: LAB | Age: 58
Discharge: HOME | End: 2018-11-13
Payer: COMMERCIAL

## 2018-11-13 VITALS
WEIGHT: 200 LBS | TEMPERATURE: 98.2 F | BODY MASS INDEX: 33.32 KG/M2 | SYSTOLIC BLOOD PRESSURE: 128 MMHG | HEART RATE: 66 BPM | HEIGHT: 65 IN | RESPIRATION RATE: 18 BRPM | DIASTOLIC BLOOD PRESSURE: 70 MMHG | OXYGEN SATURATION: 98 %

## 2018-11-13 DIAGNOSIS — R22.2 MASS OF SUBCUTANEOUS TISSUE OF BACK: ICD-10-CM

## 2018-11-13 DIAGNOSIS — M79.89 SOFT TISSUE MASS: ICD-10-CM

## 2018-11-13 DIAGNOSIS — R22.2 MASS OF SUBCUTANEOUS TISSUE OF BACK: Primary | ICD-10-CM

## 2018-11-13 DIAGNOSIS — M79.89 SOFT TISSUE MASS: Primary | ICD-10-CM

## 2018-11-13 LAB
ANION GAP SERPL CALC-SCNC: 8 MMOL/L (ref 3–18)
ATRIAL RATE: 69 BPM
BASOPHILS # BLD: 0 K/UL (ref 0–0.1)
BASOPHILS NFR BLD: 0 % (ref 0–2)
BUN SERPL-MCNC: 23 MG/DL (ref 7–18)
BUN/CREAT SERPL: 26 (ref 12–20)
CALCIUM SERPL-MCNC: 8.8 MG/DL (ref 8.5–10.1)
CALCULATED P AXIS, ECG09: -6 DEGREES
CALCULATED R AXIS, ECG10: -13 DEGREES
CALCULATED T AXIS, ECG11: 2 DEGREES
CHLORIDE SERPL-SCNC: 101 MMOL/L (ref 100–108)
CO2 SERPL-SCNC: 29 MMOL/L (ref 21–32)
CREAT SERPL-MCNC: 0.89 MG/DL (ref 0.6–1.3)
DIAGNOSIS, 93000: NORMAL
DIFFERENTIAL METHOD BLD: NORMAL
EOSINOPHIL # BLD: 0.1 K/UL (ref 0–0.4)
EOSINOPHIL NFR BLD: 1 % (ref 0–5)
ERYTHROCYTE [DISTWIDTH] IN BLOOD BY AUTOMATED COUNT: 13.5 % (ref 11.6–14.5)
GLUCOSE SERPL-MCNC: 92 MG/DL (ref 74–99)
HCT VFR BLD AUTO: 39.8 % (ref 35–45)
HGB BLD-MCNC: 13.4 G/DL (ref 12–16)
LYMPHOCYTES # BLD: 2.5 K/UL (ref 0.9–3.6)
LYMPHOCYTES NFR BLD: 28 % (ref 21–52)
MCH RBC QN AUTO: 30.4 PG (ref 24–34)
MCHC RBC AUTO-ENTMCNC: 33.7 G/DL (ref 31–37)
MCV RBC AUTO: 90.2 FL (ref 74–97)
MONOCYTES # BLD: 0.7 K/UL (ref 0.05–1.2)
MONOCYTES NFR BLD: 8 % (ref 3–10)
NEUTS SEG # BLD: 5.5 K/UL (ref 1.8–8)
NEUTS SEG NFR BLD: 63 % (ref 40–73)
P-R INTERVAL, ECG05: 192 MS
PLATELET # BLD AUTO: 186 K/UL (ref 135–420)
PMV BLD AUTO: 11.1 FL (ref 9.2–11.8)
POTASSIUM SERPL-SCNC: 4 MMOL/L (ref 3.5–5.5)
Q-T INTERVAL, ECG07: 400 MS
QRS DURATION, ECG06: 100 MS
QTC CALCULATION (BEZET), ECG08: 428 MS
RBC # BLD AUTO: 4.41 M/UL (ref 4.2–5.3)
SODIUM SERPL-SCNC: 138 MMOL/L (ref 136–145)
VENTRICULAR RATE, ECG03: 69 BPM
WBC # BLD AUTO: 8.8 K/UL (ref 4.6–13.2)

## 2018-11-13 PROCEDURE — 36415 COLL VENOUS BLD VENIPUNCTURE: CPT

## 2018-11-13 PROCEDURE — 71046 X-RAY EXAM CHEST 2 VIEWS: CPT

## 2018-11-13 PROCEDURE — 85025 COMPLETE CBC W/AUTO DIFF WBC: CPT

## 2018-11-13 PROCEDURE — 80048 BASIC METABOLIC PNL TOTAL CA: CPT

## 2018-11-13 PROCEDURE — 93005 ELECTROCARDIOGRAM TRACING: CPT

## 2018-11-13 RX ORDER — SODIUM CHLORIDE 0.9 % (FLUSH) 0.9 %
5-10 SYRINGE (ML) INJECTION AS NEEDED
Status: CANCELLED | OUTPATIENT
Start: 2018-11-13

## 2018-11-13 RX ORDER — SODIUM CHLORIDE 0.9 % (FLUSH) 0.9 %
5-10 SYRINGE (ML) INJECTION EVERY 8 HOURS
Status: CANCELLED | OUTPATIENT
Start: 2018-11-13

## 2018-11-13 NOTE — PROGRESS NOTES
[facility]  General Surgery    Subjective:      HPI:  Patient is a very pleasant 59-year-old female w of Dr. Nano Lucia ith a past medical history remarkable for prediabetes, hyperlipidemia, gastroesophageal reflux disease, diverticulitis and colon polyps. She is referred by Dr. Abhilash Adame for evaluation and management of a soft tissue mass of the right lower back. The patient states that the mass has been removed on 2 occasions but it seems to always recur. The mass was removed in the surgeon's office 10 years ago. It is tender. It feels deep to the skin and tender to touch and with pressure. She denies any unintentional weight loss. She does not recall what the pathology was when the mass was removed on the other 2 occasions but she does remember that it was said to be noncancerous. Patient Active Problem List    Diagnosis Date Noted    Seasonal allergic rhinitis 03/23/2017    Pacemaker 02/28/2017    Varicose veins of both lower extremities 09/12/2016    Obesity (BMI 30-39.9) 06/24/2016    S/P ablation operation for arrhythmia 02/08/2016    DDD (degenerative disc disease), lumbar 01/14/2016    Gastroesophageal reflux disease without esophagitis 06/19/2015    Hyperlipidemia 11/26/2012    Prediabetes 11/26/2012     Past Medical History:   Diagnosis Date    Agatston CAC score, <100 02/29/2016    ca score ZERO    Arrhythmia     PVC 10% burden; s/p partial ablation Dr Yoon Jorgensen 2/16    Asthma     h/o exercise induced, denies 3/2/18    Cardiac echocardiogram 01/19/2016    EF 55-60%. No RWMA. Indeterminate diastolic fx. RVSP 26 mmHg. Mild MR.  Cardiac Holter monitor study 12/21/2015    Sinus rhythm, avg HR 82 bpm (range  bpm). Numerous PVCs. A few short runs of VT.  Cardiovascular LE venous duplex 07/08/2016    No DVT bilaterally.     Cholelithiasis     on US 2008, 4/17    Chronic back pain     Dr. Catalina Waddell    Chronic venous insufficiency 2016    Dr Christophe Zazueta Colon polyp 03/05/2018    Dr Josh Laboy; serrated polyp and divertics    De Quervain's tenosynovitis     Dr Tiffanie Birch Decreased GFR     saw Dr Funes Nose    Diverticulitis 12/2017    CT proven    Dupuytren's contracture of both hands     Dr Tiffanie Birch FHx: heart disease     GERD (gastroesophageal reflux disease) 2/99    on UGI    History of palpitations 2005    Hyperlipidemia     calculated 10 year risk score was 1.8% (12/15); 2.2% (10/16); 2.3% (9/18)    Hypovitaminosis D     Myofacial pain     neck and thoracic    OA (osteoarthritis)     lumbar DDD    Obesity     peak weight 200 lbs, bmi 33.3 from 5/13; qsymia ineffective, intol contrave, declined med supervised wt loss; 24h U petar nl; IF 3/18 start weight 204 lbs    Osteopenia 2007    t score -1 spine, -0.2 hip Dr. Fernandez Primer Pericardial cyst 12/13    calcified on CT    Prediabetes 11/12    Restless legs syndrome     Tachycardia     neg eval Dr. Mancia Serenzo 2004    Tension headache     Thyroid nodule 8/12    3mm; 5/13, 12/13, 11/14, 1/16 no change    Trigger finger of both hands     Dr Graham Boucher      Past Surgical History:   Procedure Laterality Date    CARDIAC SURG PROCEDURE UNLIST  2004    Holter negative    CARDIAC SURG PROCEDURE UNLIST  2004    echo nl lv, ef 65%    CARDIAC SURG PROCEDURE UNLIST  02/2016    Dr Laurie Hernandez; heart ablation    HAND/FINGER SURGERY UNLISTED  12/2017    Dr Ingrid Somers; left thumb cmc arthroplasty    HX BUNIONECTOMY  2005    bilateral    HX CARPAL TUNNEL RELEASE  12/08    HX CHOLECYSTECTOMY  05/05/2017    robotic single site cholecystectomy Dr Robin Mahmood  2007    right lumbar     HX LIPOMA RESECTION      x2 right low back    HX ORTHOPAEDIC      trigger finger release Dr Sofie Orozco  2018    Dr Ingrid Somers finger surgery    HX PACEMAKER  02/28/2017    [employer]    VASCULAR SURGERY PROCEDURE UNLIST  3/12    venous doppler negative      Family History   Problem Relation Age of Onset    Hypertension Mother     Diabetes Mother     Stroke Mother     Arthritis-osteo Mother     Cancer Mother         breast    Elevated Lipids Mother     Breast Cancer Mother     Diabetes Father     Arthritis-osteo Father     Heart Disease Father     Elevated Lipids Father     Arthritis-osteo Sister     Migraines Sister     Alcohol abuse Brother     Arthritis-osteo Brother       Social History     Tobacco Use    Smoking status: Former Smoker     Packs/day: 1.00     Years: 20.00     Pack years: 20.00     Last attempt to quit: 1993     Years since quittin.8    Smokeless tobacco: Never Used    Tobacco comment:    Substance Use Topics    Alcohol use: Yes     Alcohol/week: 0.0 oz     Comment: rarely      Allergies   Allergen Reactions    Adhesive Tape-Silicones Contact Dermatitis    Bee Sting [Sting, Bee] Anaphylaxis    Benzoyl Peroxide Rash    Betadine [Povidone-Iodine] Rash    Chlorhexidine Towelette Rash    Codeine Nausea and Vomiting    Contrave [Naltrexone-Bupropion] Other (comments)     Vision changes       Prior to Admission medications    Medication Sig Start Date End Date Taking? Authorizing Provider   pramipexole (MIRAPEX) 1 mg tablet TAKE 1 TABLET BY MOUTH THREE TIMES DAILY 18  Yes Jabier Boogie MD   calcium-cholecalciferol, D3, (CALTRATE 600+D) tablet Take 1 Tab by mouth daily. Yes Provider, Historical   fish oil-dha-epa 1,200-144-216 mg cap Take 1 Cap by mouth daily. Yes Provider, Historical   traMADol (ULTRAM) 50 mg tablet Take 1 Tab by mouth every six (6) hours as needed for Pain. 18   Jabier Boogie MD   econazole nitrate (SPECTAZOLE) 1 % topical cream Apply  to affected area two (2) times a day. Patient taking differently: Apply  to affected area two (2) times a day. Indications: not taking 18   Jabier Boogie MD   EPINEPHrine (EPIPEN 2-PRITI) 0.3 mg/0.3 mL injection 0.3 mL by IntraMUSCular route once as needed for up to 1 dose.  18   Maki Jocelyn Painter MD   MULTIVITS-MIN/IRON/FA/LUTEIN (CENTRUM SILVER WOMEN PO) Take 1 Tab by mouth daily. Provider, Historical       Review of Systems:    14 systems were reviewed. The results are as above in the HPI and otherwise negative. Objective:     Vitals:    11/13/18 1342   BP: 128/70   Pulse: 66   Resp: 18   Temp: 98.2 °F (36.8 °C)   TempSrc: Oral   SpO2: 98%   Weight: 90.7 kg (200 lb)   Height: 5' 5\" (1.651 m)       Physical Exam:  GENERAL: alert, cooperative, no distress, appears stated age,   EYE: conjunctivae/corneas clear. PERRL, EOM's intact. THROAT & NECK: normal and no erythema or exudates noted. ,    LYMPHATIC: Cervical, supraclavicular, and axillary nodes normal. ,   LUNG: clear to auscultation bilaterally,   HEART: regular rate and rhythm, S1, S2 normal, no murmur, click, rub or gallop,   ABDOMEN: soft, non-tender. Bowel sounds normal. No masses,  no organomegaly,   EXTREMITIES:  extremities normal, atraumatic, no cyanosis or edema,   SKIN: In the right lower back lumbar spine to the right of the spine there is a incision and lateral to the end of the incision is a mobile fatty tissues soft tissue mass which measures 3 x 2 cm. NEUROLOGIC: AOx3. Cranial nerves 2-12 and sensation grossly intact. ,     Data Review:  to be done    Ms. Aubry Spurling has a reminder for a \"due or due soon\" health maintenance. I have asked that she contact her primary care provider for follow-up on this health maintenance. Impression:     · Patient with soft tissue mass of the right lower back.     Plan:     · Excisional biopsy of soft tissue mass of right lower back (left decubitus position)  · Consent on chart  · Preoperative orders written    Signed By: Antoinette Villalobos MD     November 13, 2018

## 2018-11-13 NOTE — H&P (VIEW-ONLY)
[facility] General Surgery Subjective: HPI:  Patient is a very pleasant 70-year-old female w of Dr. Jose Bautista ith a past medical history remarkable for prediabetes, hyperlipidemia, gastroesophageal reflux disease, diverticulitis and colon polyps. She is referred by Dr. Tran Muniz for evaluation and management of a soft tissue mass of the right lower back. The patient states that the mass has been removed on 2 occasions but it seems to always recur. The mass was removed in the surgeon's office 10 years ago. It is tender. It feels deep to the skin and tender to touch and with pressure. She denies any unintentional weight loss. She does not recall what the pathology was when the mass was removed on the other 2 occasions but she does remember that it was said to be noncancerous. Patient Active Problem List  
 Diagnosis Date Noted  Seasonal allergic rhinitis 03/23/2017  Pacemaker 02/28/2017  Varicose veins of both lower extremities 09/12/2016  Obesity (BMI 30-39.9) 06/24/2016  S/P ablation operation for arrhythmia 02/08/2016  DDD (degenerative disc disease), lumbar 01/14/2016  Gastroesophageal reflux disease without esophagitis 06/19/2015  Hyperlipidemia 11/26/2012  Prediabetes 11/26/2012 Past Medical History:  
Diagnosis Date  Agatston CAC score, <100 02/29/2016  
 ca score ZERO  Arrhythmia PVC 10% burden; s/p partial ablation Dr Beverly Mcconnell 2/16  Asthma   
 h/o exercise induced, denies 3/2/18  Cardiac echocardiogram 01/19/2016 EF 55-60%. No RWMA. Indeterminate diastolic fx. RVSP 26 mmHg. Mild MR.  Cardiac Holter monitor study 12/21/2015 Sinus rhythm, avg HR 82 bpm (range  bpm). Numerous PVCs. A few short runs of VT.  Cardiovascular LE venous duplex 07/08/2016 No DVT bilaterally.  Cholelithiasis   
 on US 2008, 4/17  Chronic back pain Dr. Charlene Bejarano  Chronic venous insufficiency 2016 Dr Carson Olsen  Colon polyp 03/05/2018 Dr Ashwini Lopez; serrated polyp and divertics  De Quervain's tenosynovitis Dr Rosalva Monae  Decreased GFR   
 saw Dr Nikki Cuadra  Diverticulitis 12/2017 CT proven  Dupuytren's contracture of both hands Dr Rosalva Monae  FHx: heart disease  GERD (gastroesophageal reflux disease) 2/99  
 on UGI  History of palpitations 2005  Hyperlipidemia   
 calculated 10 year risk score was 1.8% (12/15); 2.2% (10/16); 2.3% (9/18)  Hypovitaminosis D  Myofacial pain   
 neck and thoracic  OA (osteoarthritis)   
 lumbar DDD  Obesity   
 peak weight 200 lbs, bmi 33.3 from 5/13; qsymia ineffective, intol contrave, declined med supervised wt loss; 24h U petar nl; IF 3/18 start weight 204 lbs  Osteopenia 2007  
 t score -1 spine, -0.2 hip Dr. Yin Estevez  Pericardial cyst 12/13  
 calcified on CT  
 Prediabetes 11/12  Restless legs syndrome  Tachycardia   
 neg eval Dr. Almas Farooq 2004  Tension headache  Thyroid nodule 8/12  
 3mm; 5/13, 12/13, 11/14, 1/16 no change  Trigger finger of both hands Dr Rosalva Monae Past Surgical History:  
Procedure Laterality Date  CARDIAC SURG PROCEDURE UNLIST  2004 Holter negative  CARDIAC SURG PROCEDURE UNLIST  2004  
 echo nl lv, ef 65%  CARDIAC SURG PROCEDURE UNLIST  02/2016 Dr Maribel Booth; heart ablation  HAND/FINGER SURGERY UNLISTED  12/2017 Dr Loly Jonas; left thumb cmc arthroplasty  HX BUNIONECTOMY  2005  
 bilateral  
 HX CARPAL TUNNEL RELEASE  12/08  HX CHOLECYSTECTOMY  05/05/2017  
 robotic single site cholecystectomy Dr Latoya Arana  HX CYST REMOVAL  2007  
 right lumbar  HX LIPOMA RESECTION    
 x2 right low back  HX ORTHOPAEDIC    
 trigger finger release Dr Rajan Byrd  HX ORTHOPAEDIC  2018 Dr Loly Jonas finger surgery  HX PACEMAKER  02/28/2017 [employer]  VASCULAR SURGERY PROCEDURE UNLIST  3/12  
 venous doppler negative Family History Problem Relation Age of Onset  Hypertension Mother  Diabetes Mother  Stroke Mother [place] Arthritis-osteo Mother  Cancer Mother   
     breast  
 Elevated Lipids Mother  Breast Cancer Mother  Diabetes Father  Arthritis-osteo Father  Heart Disease Father  Elevated Lipids Father  Arthritis-osteo Sister  Migraines Sister  Alcohol abuse Brother  Arthritis-osteo Brother Social History Tobacco Use  Smoking status: Former Smoker Packs/day: 1.00 Years: 20.00 Pack years: 20.00 Last attempt to quit: 1993 Years since quittin.8  Smokeless tobacco: Never Used  Tobacco comment:  Substance Use Topics  Alcohol use: Yes Alcohol/week: 0.0 oz  
  Comment: rarely Allergies Allergen Reactions  Adhesive Tape-Silicones Contact Dermatitis  Bee Sting [Sting, Bee] Anaphylaxis  Benzoyl Peroxide Rash  Betadine [Povidone-Iodine] Rash  Chlorhexidine Towelette Rash  Codeine Nausea and Vomiting  Contrave [Naltrexone-Bupropion] Other (comments) Vision changes Prior to Admission medications Medication Sig Start Date End Date Taking? Authorizing Provider  
pramipexole (MIRAPEX) 1 mg tablet TAKE 1 TABLET BY MOUTH THREE TIMES DAILY 18  Yes Mauricia Schilder, MD  
calcium-cholecalciferol, D3, (CALTRATE 600+D) tablet Take 1 Tab by mouth daily. Yes Provider, Historical  
fish oil-dha-epa 1,200-144-216 mg cap Take 1 Cap by mouth daily. Yes Provider, Historical  
traMADol (ULTRAM) 50 mg tablet Take 1 Tab by mouth every six (6) hours as needed for Pain. 18   Mauricia Schilder, MD  
econazole nitrate (SPECTAZOLE) 1 % topical cream Apply  to affected area two (2) times a day. Patient taking differently: Apply  to affected area two (2) times a day.  Indications: not taking 18   Mauricia Schilder, MD  
EPINEPHrine (EPIPEN 2-PRITI) 0.3 mg/0.3 mL injection 0.3 mL by IntraMUSCular route once as needed for up to 1 dose. 5/30/18   Mirta Paiz MD  
MULTIVITS-MIN/IRON/FA/LUTEIN (CENTRUM SILVER WOMEN PO) Take 1 Tab by mouth daily. Provider, Historical  
 
 
Review of Systems:   
14 systems were reviewed. The results are as above in the HPI and otherwise negative. Objective:  
 
Vitals:  
 11/13/18 1342 BP: 128/70 Pulse: 66 Resp: 18 Temp: 98.2 °F (36.8 °C) TempSrc: Oral  
SpO2: 98% Weight: 90.7 kg (200 lb) Height: 5' 5\" (1.651 m) Physical Exam: 
GENERAL: alert, cooperative, no distress, appears stated age, EYE: conjunctivae/corneas clear. PERRL, EOM's intact. THROAT & NECK: normal and no erythema or exudates noted. ,   
LYMPHATIC: Cervical, supraclavicular, and axillary nodes normal. ,  
LUNG: clear to auscultation bilaterally, HEART: regular rate and rhythm, S1, S2 normal, no murmur, click, rub or gallop, ABDOMEN: soft, non-tender. Bowel sounds normal. No masses,  no organomegaly, EXTREMITIES:  extremities normal, atraumatic, no cyanosis or edema, SKIN: In the right lower back lumbar spine to the right of the spine there is a incision and lateral to the end of the incision is a mobile fatty tissues soft tissue mass which measures 3 x 2 cm. NEUROLOGIC: AOx3. Cranial nerves 2-12 and sensation grossly intact. ,  
 
Data Review:  to be done Ms. Radha Christine has a reminder for a \"due or due soon\" health maintenance. I have asked that she contact her primary care provider for follow-up on this health maintenance. Impression: · Patient with soft tissue mass of the right lower back. Plan:  
 
· Excisional biopsy of soft tissue mass of right lower back (left decubitus position) · Consent on chart · Preoperative orders written Signed By: Kristian Falcon MD   
 November 13, 2018

## 2018-11-13 NOTE — PROGRESS NOTES
HISTORY OF PRESENT ILLNESS  Hortensia Romano is a 62 y.o. female. Mass         Review of Systems   Constitutional: Negative. HENT: Negative. Eyes: Negative. Respiratory: Negative. Cardiovascular: Negative. Gastrointestinal: Negative. Genitourinary: Negative. Musculoskeletal: Positive for back pain, joint pain, myalgias and neck pain. Negative for falls. Skin: Negative. Neurological: Negative. Endo/Heme/Allergies: Negative. Psychiatric/Behavioral: Negative.         Physical Exam

## 2018-11-21 ENCOUNTER — ANESTHESIA EVENT (OUTPATIENT)
Dept: SURGERY | Age: 58
End: 2018-11-21
Payer: COMMERCIAL

## 2018-11-23 ENCOUNTER — HOSPITAL ENCOUNTER (OUTPATIENT)
Age: 58
Discharge: HOME | End: 2018-11-23
Attending: SURGERY | Admitting: SURGERY
Payer: COMMERCIAL

## 2018-11-23 ENCOUNTER — ANESTHESIA (OUTPATIENT)
Dept: SURGERY | Age: 58
End: 2018-11-23
Payer: COMMERCIAL

## 2018-11-23 VITALS
HEIGHT: 65 IN | OXYGEN SATURATION: 100 % | BODY MASS INDEX: 33.49 KG/M2 | DIASTOLIC BLOOD PRESSURE: 65 MMHG | RESPIRATION RATE: 18 BRPM | SYSTOLIC BLOOD PRESSURE: 104 MMHG | HEART RATE: 71 BPM | WEIGHT: 201 LBS | TEMPERATURE: 97.9 F

## 2018-11-23 DIAGNOSIS — G89.18 POSTOPERATIVE PAIN: Primary | ICD-10-CM

## 2018-11-23 PROBLEM — R22.2 MASS OF SUBCUTANEOUS TISSUE OF BACK: Status: ACTIVE | Noted: 2018-11-23

## 2018-11-23 PROCEDURE — 74011250636 HC RX REV CODE- 250/636

## 2018-11-23 PROCEDURE — 74011250637 HC RX REV CODE- 250/637: Performed by: NURSE ANESTHETIST, CERTIFIED REGISTERED

## 2018-11-23 PROCEDURE — 77030031139 HC SUT VCRL2 J&J -A: Performed by: SURGERY

## 2018-11-23 PROCEDURE — 77030002933 HC SUT MCRYL J&J -A: Performed by: SURGERY

## 2018-11-23 PROCEDURE — 76060000033 HC ANESTHESIA 1 TO 1.5 HR: Performed by: SURGERY

## 2018-11-23 PROCEDURE — 74011250636 HC RX REV CODE- 250/636: Performed by: SURGERY

## 2018-11-23 PROCEDURE — 76210000020 HC REC RM PH II FIRST 0.5 HR: Performed by: SURGERY

## 2018-11-23 PROCEDURE — 76010000149 HC OR TIME 1 TO 1.5 HR: Performed by: SURGERY

## 2018-11-23 PROCEDURE — 77030020782 HC GWN BAIR PAWS FLX 3M -B: Performed by: SURGERY

## 2018-11-23 PROCEDURE — 77030039266 HC ADH SKN EXOFIN S2SG -A: Performed by: SURGERY

## 2018-11-23 PROCEDURE — 77030013079 HC BLNKT BAIR HGGR 3M -A: Performed by: ANESTHESIOLOGY

## 2018-11-23 PROCEDURE — 74011000250 HC RX REV CODE- 250: Performed by: SURGERY

## 2018-11-23 PROCEDURE — 74011250636 HC RX REV CODE- 250/636: Performed by: NURSE ANESTHETIST, CERTIFIED REGISTERED

## 2018-11-23 PROCEDURE — 88307 TISSUE EXAM BY PATHOLOGIST: CPT

## 2018-11-23 PROCEDURE — 77030032490 HC SLV COMPR SCD KNE COVD -B: Performed by: SURGERY

## 2018-11-23 PROCEDURE — 88304 TISSUE EXAM BY PATHOLOGIST: CPT

## 2018-11-23 PROCEDURE — 76210000063 HC OR PH I REC FIRST 0.5 HR: Performed by: SURGERY

## 2018-11-23 RX ORDER — NALOXONE HYDROCHLORIDE 0.4 MG/ML
0.2 INJECTION, SOLUTION INTRAMUSCULAR; INTRAVENOUS; SUBCUTANEOUS AS NEEDED
Status: DISCONTINUED | OUTPATIENT
Start: 2018-11-23 | End: 2018-11-23 | Stop reason: HOSPADM

## 2018-11-23 RX ORDER — FAMOTIDINE 20 MG/1
20 TABLET, FILM COATED ORAL ONCE
Status: COMPLETED | OUTPATIENT
Start: 2018-11-23 | End: 2018-11-23

## 2018-11-23 RX ORDER — LIDOCAINE HYDROCHLORIDE 20 MG/ML
INJECTION, SOLUTION EPIDURAL; INFILTRATION; INTRACAUDAL; PERINEURAL AS NEEDED
Status: DISCONTINUED | OUTPATIENT
Start: 2018-11-23 | End: 2018-11-23 | Stop reason: HOSPADM

## 2018-11-23 RX ORDER — FENTANYL CITRATE 50 UG/ML
50 INJECTION, SOLUTION INTRAMUSCULAR; INTRAVENOUS
Status: DISCONTINUED | OUTPATIENT
Start: 2018-11-23 | End: 2018-11-23 | Stop reason: HOSPADM

## 2018-11-23 RX ORDER — BUPIVACAINE HYDROCHLORIDE AND EPINEPHRINE 2.5; 5 MG/ML; UG/ML
INJECTION, SOLUTION EPIDURAL; INFILTRATION; INTRACAUDAL; PERINEURAL AS NEEDED
Status: DISCONTINUED | OUTPATIENT
Start: 2018-11-23 | End: 2018-11-23 | Stop reason: HOSPADM

## 2018-11-23 RX ORDER — SODIUM CHLORIDE, SODIUM LACTATE, POTASSIUM CHLORIDE, CALCIUM CHLORIDE 600; 310; 30; 20 MG/100ML; MG/100ML; MG/100ML; MG/100ML
75 INJECTION, SOLUTION INTRAVENOUS CONTINUOUS
Status: DISCONTINUED | OUTPATIENT
Start: 2018-11-23 | End: 2018-11-23 | Stop reason: HOSPADM

## 2018-11-23 RX ORDER — LIDOCAINE HYDROCHLORIDE 10 MG/ML
0.1 INJECTION, SOLUTION EPIDURAL; INFILTRATION; INTRACAUDAL; PERINEURAL AS NEEDED
Status: DISCONTINUED | OUTPATIENT
Start: 2018-11-23 | End: 2018-11-23 | Stop reason: HOSPADM

## 2018-11-23 RX ORDER — MIDAZOLAM HYDROCHLORIDE 1 MG/ML
INJECTION, SOLUTION INTRAMUSCULAR; INTRAVENOUS AS NEEDED
Status: DISCONTINUED | OUTPATIENT
Start: 2018-11-23 | End: 2018-11-23 | Stop reason: HOSPADM

## 2018-11-23 RX ORDER — SODIUM CHLORIDE 0.9 % (FLUSH) 0.9 %
5-10 SYRINGE (ML) INJECTION EVERY 8 HOURS
Status: DISCONTINUED | OUTPATIENT
Start: 2018-11-23 | End: 2018-11-23 | Stop reason: HOSPADM

## 2018-11-23 RX ORDER — SODIUM CHLORIDE 0.9 % (FLUSH) 0.9 %
5-10 SYRINGE (ML) INJECTION AS NEEDED
Status: DISCONTINUED | OUTPATIENT
Start: 2018-11-23 | End: 2018-11-23 | Stop reason: HOSPADM

## 2018-11-23 RX ORDER — CEFAZOLIN SODIUM 2 G/50ML
2 SOLUTION INTRAVENOUS
Status: COMPLETED | OUTPATIENT
Start: 2018-11-23 | End: 2018-11-23

## 2018-11-23 RX ORDER — PROPOFOL 10 MG/ML
INJECTION, EMULSION INTRAVENOUS AS NEEDED
Status: DISCONTINUED | OUTPATIENT
Start: 2018-11-23 | End: 2018-11-23 | Stop reason: HOSPADM

## 2018-11-23 RX ORDER — FENTANYL CITRATE 50 UG/ML
INJECTION, SOLUTION INTRAMUSCULAR; INTRAVENOUS AS NEEDED
Status: DISCONTINUED | OUTPATIENT
Start: 2018-11-23 | End: 2018-11-23 | Stop reason: HOSPADM

## 2018-11-23 RX ORDER — SODIUM CHLORIDE, SODIUM LACTATE, POTASSIUM CHLORIDE, CALCIUM CHLORIDE 600; 310; 30; 20 MG/100ML; MG/100ML; MG/100ML; MG/100ML
50 INJECTION, SOLUTION INTRAVENOUS CONTINUOUS
Status: DISCONTINUED | OUTPATIENT
Start: 2018-11-23 | End: 2018-11-23 | Stop reason: HOSPADM

## 2018-11-23 RX ORDER — HYDROCODONE BITARTRATE AND ACETAMINOPHEN 5; 325 MG/1; MG/1
TABLET ORAL
Qty: 30 TAB | Refills: 0 | Status: SHIPPED | OUTPATIENT
Start: 2018-11-23 | End: 2019-04-05

## 2018-11-23 RX ORDER — KETOROLAC TROMETHAMINE 30 MG/ML
30 INJECTION, SOLUTION INTRAMUSCULAR; INTRAVENOUS AS NEEDED
Status: DISCONTINUED | OUTPATIENT
Start: 2018-11-23 | End: 2018-11-23 | Stop reason: HOSPADM

## 2018-11-23 RX ADMIN — PROPOFOL 40 MG: 10 INJECTION, EMULSION INTRAVENOUS at 11:46

## 2018-11-23 RX ADMIN — FENTANYL CITRATE 50 MCG: 50 INJECTION, SOLUTION INTRAMUSCULAR; INTRAVENOUS at 11:19

## 2018-11-23 RX ADMIN — PROPOFOL 40 MG: 10 INJECTION, EMULSION INTRAVENOUS at 12:01

## 2018-11-23 RX ADMIN — PROPOFOL 30 MG: 10 INJECTION, EMULSION INTRAVENOUS at 11:26

## 2018-11-23 RX ADMIN — FAMOTIDINE 20 MG: 20 TABLET ORAL at 07:53

## 2018-11-23 RX ADMIN — LIDOCAINE HYDROCHLORIDE 80 MG: 20 INJECTION, SOLUTION EPIDURAL; INFILTRATION; INTRACAUDAL; PERINEURAL at 11:19

## 2018-11-23 RX ADMIN — PROPOFOL 20 MG: 10 INJECTION, EMULSION INTRAVENOUS at 11:37

## 2018-11-23 RX ADMIN — MIDAZOLAM HYDROCHLORIDE 2 MG: 1 INJECTION, SOLUTION INTRAMUSCULAR; INTRAVENOUS at 11:17

## 2018-11-23 RX ADMIN — PROPOFOL 40 MG: 10 INJECTION, EMULSION INTRAVENOUS at 11:31

## 2018-11-23 RX ADMIN — PROPOFOL 30 MG: 10 INJECTION, EMULSION INTRAVENOUS at 11:38

## 2018-11-23 RX ADMIN — CEFAZOLIN SODIUM 2 G: 2 SOLUTION INTRAVENOUS at 11:12

## 2018-11-23 RX ADMIN — FENTANYL CITRATE 50 MCG: 50 INJECTION, SOLUTION INTRAMUSCULAR; INTRAVENOUS at 11:32

## 2018-11-23 RX ADMIN — SODIUM CHLORIDE, SODIUM LACTATE, POTASSIUM CHLORIDE, AND CALCIUM CHLORIDE 50 ML/HR: 600; 310; 30; 20 INJECTION, SOLUTION INTRAVENOUS at 07:53

## 2018-11-23 RX ADMIN — PROPOFOL 40 MG: 10 INJECTION, EMULSION INTRAVENOUS at 11:36

## 2018-11-23 RX ADMIN — PROPOFOL 40 MG: 10 INJECTION, EMULSION INTRAVENOUS at 11:21

## 2018-11-23 RX ADMIN — PROPOFOL 20 MG: 10 INJECTION, EMULSION INTRAVENOUS at 11:24

## 2018-11-23 RX ADMIN — PROPOFOL 20 MG: 10 INJECTION, EMULSION INTRAVENOUS at 11:25

## 2018-11-23 NOTE — DISCHARGE INSTRUCTIONS
[facility]  Nya Gomez MD, [facility]  General Surgery    Pt may remove the dressing and shower in two days. Allow soap and water to run over the incision. Apply an ice pack to the right lower back for 30 minutes 3 times daily to help reduce swelling and pain for 7 days. No driving or operating heavy machinery while on narcotic pain medications. No strenuous activity or contact sports for two weeks. No lifting greater than 15 lbs for 2 weeks. Call MD for any redness, swelling, bleeding or pus at the incision. Also call for any nausea, vomiting, increased pain or pain uncontrolled by pain medicine. Lipoma: Care Instructions  Your Care Instructions  A lipoma is a growth of fat just below the skin. It may feel soft and rubbery. Lipomas can occur anywhere on the body. But they are most common on the torso, neck, upper thighs, upper arms, and armpits. A lipoma does not turn into cancer. Lipomas usually are not treated, because most of them don't hurt or cause problems. But your doctor may remove a lipoma if it is painful, gets infected, or bothers you. Follow-up care is a key part of your treatment and safety. Be sure to make and go to all appointments, and call your doctor if you are having problems. It's also a good idea to know your test results and keep a list of the medicines you take. How can you care for yourself at home? · A lipoma usually needs no care at home unless your doctor made a cut (incision) to remove it. · If your doctor told you how to care for your incision, follow your doctor's instructions. If you did not get instructions, follow this general advice:  ? Wash around the incision with clean water 2 times a day. Don't use hydrogen peroxide or alcohol. These can slow healing. ? You may cover the incision with a thin layer of petroleum jelly, such as Vaseline, and a nonstick bandage. ? Apply more petroleum jelly and replace the bandage as needed.   When should you call for help? Call your doctor now or seek immediate medical care if:    · You have signs of infection, such as:  ? Increased pain, swelling, warmth, or redness. ? Red streaks leading from the lipoma. ? Pus draining from the lipoma. ? A fever.    Watch closely for changes in your health, and be sure to contact your doctor if:    · The lipoma is growing or changing.     · You do not get better as expected. Where can you learn more? Go to http://mima-jose c.info/. Enter V350 in the search box to learn more about \"Lipoma: Care Instructions. \"  Current as of: April 18, 2018  Content Version: 11.8  © 1179-2118 [employer]. Care instructions adapted under license by [employer] (which disclaims liability or warranty for this information). If you have questions about a medical condition or this instruction, always ask your healthcare professional. [place] [address] any warranty or liability for your use of this information. DISCHARGE SUMMARY from Nurse    PATIENT INSTRUCTIONS:    After general anesthesia or intravenous sedation, for 24 hours or while taking prescription Narcotics:  · Limit your activities  · Do not drive and operate hazardous machinery  · Do not make important personal or business decisions  · Do  not drink alcoholic beverages  · If you have not urinated within 8 hours after discharge, please contact your surgeon on call.     Report the following to your surgeon:  · Excessive pain, swelling, redness or odor of or around the surgical area  · Temperature over 100.5  · Nausea and vomiting lasting longer than 4 hours or if unable to take medications  · Any signs of decreased circulation or nerve impairment to extremity: change in color, persistent  numbness, tingling, coldness or increase pain  · Any questions    What to do at Home:  Recommended activity: Activity as tolerated and no driving for today    These are general instructions for a healthy lifestyle:    No smoking/ No tobacco products/ Avoid exposure to second hand smoke  Surgeon General's Warning:  Quitting smoking now greatly reduces serious risk to your health. Obesity, smoking, and sedentary lifestyle greatly increases your risk for illness    A healthy diet, regular physical exercise & weight monitoring are important for maintaining a healthy lifestyle    You may be retaining fluid if you have a history of heart failure or if you experience any of the following symptoms:  Weight gain of 3 pounds or more overnight or 5 pounds in a week, increased swelling in our hands or feet or shortness of breath while lying flat in bed. Please call your doctor as soon as you notice any of these symptoms; do not wait until your next office visit. Recognize signs and symptoms of STROKE:    F-face looks uneven    A-arms unable to move or move unevenly    S-speech slurred or non-existent    T-time-call 911 as soon as signs and symptoms begin-DO NOT go       Back to bed or wait to see if you get better-TIME IS BRAIN. Warning Signs of HEART ATTACK     Call 911 if you have these symptoms:   Chest discomfort. Most heart attacks involve discomfort in the center of the chest that lasts more than a few minutes, or that goes away and comes back. It can feel like uncomfortable pressure, squeezing, fullness, or pain.  Discomfort in other areas of the upper body. Symptoms can include pain or discomfort in one or both arms, the back, neck, jaw, or stomach.  Shortness of breath with or without chest discomfort.  Other signs may include breaking out in a cold sweat, nausea, or lightheadedness. Don't wait more than five minutes to call 911 - MINUTES MATTER! Fast action can save your life. Calling 911 is almost always the fastest way to get lifesaving treatment.  Emergency Medical Services staff can begin treatment when they arrive -- up to an hour sooner than if someone gets to the [facility] [place] by [facility]. The discharge information has been reviewed with the patient and spouse. The patient and spouse verbalized understanding. Discharge medications reviewed with the patient and spouse and appropriate educational materials and side effects teaching were provided.   ___________________________________________________________________________________________________________________________________

## 2018-11-23 NOTE — INTERVAL H&P NOTE
H&P Update: 
Jace Wilder was seen and examined. History and physical has been reviewed. The patient has been examined.  There have been no significant clinical changes since the completion of the originally dated History and Physical. 
 
Signed By: Nya Gomez MD   
 November 23, 2018 7:16 AM

## 2018-11-23 NOTE — DISCHARGE SUMMARY
4 Mack Werner MD, [facility]  General Surgery  Discharge Summary     Patient ID:  Laurent Pizano  [id number]  55 y.o.  1960    Admit Date: 11/23/2018    Discharge Date: 11/23/2018    Admission Diagnoses: Soft tissue mass [M79.9]; Mass of subcutaneous tissue of back [R22.2]    Discharge Diagnoses:    Problem List as of 11/23/2018 Date Reviewed: 11/23/2018          Codes Class Noted - Resolved    Mass of subcutaneous tissue of back ICD-10-CM: R22.2  ICD-9-CM: 782.2  11/23/2018 - Present        Seasonal allergic rhinitis ICD-10-CM: J30.1  ICD-9-CM: 477.0  3/23/2017 - Present        Pacemaker ICD-10-CM: Z95.0  ICD-9-CM: V45.01  2/28/2017 - Present    Overview Signed 2/28/2017 10:02 AM by Jose J Crow MD     [employer]tronic pacemaker 2/28/17             Varicose veins of both lower extremities ICD-10-CM: I83.93  ICD-9-CM: 454.9  9/12/2016 - Present        Obesity (BMI 30-39. 9) ICD-10-CM: E66.9  ICD-9-CM: 278.00  6/24/2016 - Present        S/P ablation operation for arrhythmia ICD-10-CM: Z98.890, Z86.79  ICD-9-CM: V45.89  2/8/2016 - Present    Overview Signed 2/8/2016  9:35 AM by Jose J Crow MD     EP study with mapping of PVC's and ablation along the RVOT. Limited ablation due to low frequency of PVC's.              DDD (degenerative disc disease), lumbar ICD-10-CM: M51.36  ICD-9-CM: 722.52  1/14/2016 - Present        Gastroesophageal reflux disease without esophagitis ICD-10-CM: K21.9  ICD-9-CM: 530.81  6/19/2015 - Present        Hyperlipidemia ICD-10-CM: E78.5  ICD-9-CM: 272.4  11/26/2012 - Present    Overview Signed 9/16/2016  5:11 PM by Ananya Morrison MD     Ca score zero             Prediabetes ICD-10-CM: R73.03  ICD-9-CM: 790.29  11/26/2012 - Present               Admission Condition: Good    Discharge Condition: Good    Last Procedure: Procedure(s):  EXCISIONAL BIOPSY SOFT TISSUE MASS OF THE BACK (LEFT LATERAL DECUBITUS)    Hospital Course:   Normal hospital course for this procedure. Consults: None    Significant Diagnostic Studies: None    Disposition: home    Patient Instructions:   Current Discharge Medication List      START taking these medications    Details   HYDROcodone-acetaminophen (NORCO) 5-325 mg per tablet 1-2 tablets every 4-6 hour prn pain  Qty: 30 Tab, Refills: 0    Associated Diagnoses: Postoperative pain      docusate sodium (COLACE) 50 mg capsule Take 1 Cap by mouth two (2) times a day for 90 days. Qty: 60 Cap, Refills: 2         CONTINUE these medications which have NOT CHANGED    Details   pramipexole (MIRAPEX) 1 mg tablet TAKE 1 TABLET BY MOUTH THREE TIMES DAILY  Qty: 180 Tab, Refills: 0      calcium-cholecalciferol, D3, (CALTRATE 600+D) tablet Take 1 Tab by mouth daily. fish oil-dha-epa 1,200-144-216 mg cap Take 1 Cap by mouth daily. traMADol (ULTRAM) 50 mg tablet Take 1 Tab by mouth every six (6) hours as needed for Pain. Qty: 40 Tab, Refills: 0    Comments: Rx has  - no refills remain  Associated Diagnoses: DDD (degenerative disc disease), lumbar      EPINEPHrine (EPIPEN 2-PRITI) 0.3 mg/0.3 mL injection 0.3 mL by IntraMUSCular route once as needed for up to 1 dose. Qty: 2 Syringe, Refills: 5    Comments: PT PREFERS GENERIC (DISPENSE ADRENACLICK IF AVAILABLE)           Activity: See surgical instructions  Diet: Low fat, Low cholesterol  Wound Care: As directed    Follow-up with Dr. Yunior Salinas in 2 weeks.   Follow-up tests/labs none    Signed:  Alize Murillo MD  2018  12:14 PM

## 2018-11-23 NOTE — ANESTHESIA PREPROCEDURE EVALUATION
Anesthetic History No history of anesthetic complications Review of Systems / Medical History Patient summary reviewed and pertinent labs reviewed Pulmonary Asthma Neuro/Psych Cardiovascular Dysrhythmias Pacemaker GI/Hepatic/Renal 
  
GERD Endo/Other Hypothyroidism Morbid obesity and arthritis Other Findings Physical Exam 
 
Airway Mallampati: II 
TM Distance: 4 - 6 cm Neck ROM: normal range of motion Mouth opening: Normal 
 
 Cardiovascular Rhythm: regular Rate: normal 
 
 
 
 Dental 
 
Dentition: Poor dentition Pulmonary Breath sounds clear to auscultation Abdominal 
GI exam deferred Other Findings Anesthetic Plan ASA: 3 Anesthesia type: MAC and general - backup Induction: Intravenous Anesthetic plan and risks discussed with: Patient

## 2018-11-23 NOTE — OP NOTES
[place] [address]  Chava Pennington                                 OPERATIVE REPORT    PATIENT:    Campos Casper  MRN:           [id number]   DATE:   2018  BILLIN  ROOM:       OR/PL  ATTENDING:   Flora Xie MD  DICTATING:   Flora Xie MD      Preoperative Dx: 2 soft tissue masses in the right lower back    Postoperative Dx: Same     Procedure: Excisional biopsy of 2 soft masses of the right lower back    Surgeon:  Gely Cunha MD    Assistant: Josr Singh MD    Anesthesia:  General and Local -  .25% Marcaine plain    Findings:   Lipoma x2    Specimen: Lipoma x2    EBL: 5 mL    Fluid: 084 mL    Complications:  None    Implants: None    Brief Operative Indication:   Painful soft tissue masses of the right lower back    Description of operation :  The patient was identified in the preoperative area. Informed consent was obtained from the patient. Time out was performed to insure correct procedure. The patient was positioned left lateral decubitus position on the table. The skin was prepped with betadine and sterile drape applied. The lower mass adjacent to the previous incision was removed first. The local anesthetic was infiltrated into the skin and subcutaneous tissues. The fifteen blade was used to create an incision through skin and subcutaneous tissue down to expose the lipoma. He was removed using electrocautery. Attention was then turned to removal of the mass which was approximately 3 cm cephalad. Again the tissues were numbed with the local anesthetic. The 15 blade was used to create an incision through skin and subcutaneous tissues to expose the lipoma. The lipoma was removed using electrocautery and blunt dissection. The deep dermal tissues at both incisions were re approximated using 3-0 Vicryl suture with interrupted simple stitches. The skin at both incisions was re approximated using 4-0 Vicryl suture in a running subcuticular closure. Steri-Strips were placed across the incision to reinforce the incision. The dressing was created using 4 x 4 gauze and Tegaderm. She tolerated the procedure well. She was stable transport to the recovery.

## 2018-11-26 ENCOUNTER — HOSPITAL ENCOUNTER (OUTPATIENT)
Dept: LAB | Age: 58
Discharge: HOME | End: 2018-11-26
Payer: COMMERCIAL

## 2018-11-26 LAB
25(OH)D3 SERPL-MCNC: 19.9 NG/ML (ref 30–100)
ALBUMIN SERPL-MCNC: 4 G/DL (ref 3.4–5)
ANION GAP SERPL CALC-SCNC: 9 MMOL/L (ref 3–18)
APPEARANCE UR: CLEAR
BACTERIA URNS QL MICRO: ABNORMAL /HPF
BASOPHILS # BLD: 0 K/UL (ref 0–0.1)
BASOPHILS NFR BLD: 0 % (ref 0–2)
BILIRUB UR QL: NEGATIVE
BUN SERPL-MCNC: 18 MG/DL (ref 7–18)
BUN/CREAT SERPL: 21 (ref 12–20)
CALCIUM SERPL-MCNC: 8.9 MG/DL (ref 8.5–10.1)
CALCIUM SERPL-MCNC: 9.2 MG/DL (ref 8.5–10.1)
CHLORIDE SERPL-SCNC: 105 MMOL/L (ref 100–108)
CO2 SERPL-SCNC: 26 MMOL/L (ref 21–32)
COLOR UR: YELLOW
CREAT SERPL-MCNC: 0.85 MG/DL (ref 0.6–1.3)
CREAT UR-MCNC: 126 MG/DL (ref 30–125)
DIFFERENTIAL METHOD BLD: ABNORMAL
EOSINOPHIL # BLD: 0.1 K/UL (ref 0–0.4)
EOSINOPHIL NFR BLD: 1 % (ref 0–5)
EPITH CASTS URNS QL MICRO: ABNORMAL /LPF (ref 0–5)
ERYTHROCYTE [DISTWIDTH] IN BLOOD BY AUTOMATED COUNT: 13.6 % (ref 11.6–14.5)
GLUCOSE SERPL-MCNC: 121 MG/DL (ref 74–99)
GLUCOSE UR STRIP.AUTO-MCNC: NEGATIVE MG/DL
HCT VFR BLD AUTO: 40.7 % (ref 35–45)
HGB BLD-MCNC: 13.5 G/DL (ref 12–16)
HGB UR QL STRIP: NEGATIVE
KETONES UR QL STRIP.AUTO: NEGATIVE MG/DL
LEUKOCYTE ESTERASE UR QL STRIP.AUTO: NEGATIVE
LYMPHOCYTES # BLD: 2.4 K/UL (ref 0.9–3.6)
LYMPHOCYTES NFR BLD: 16 % (ref 21–52)
MCH RBC QN AUTO: 30.4 PG (ref 24–34)
MCHC RBC AUTO-ENTMCNC: 33.2 G/DL (ref 31–37)
MCV RBC AUTO: 91.7 FL (ref 74–97)
MICROALBUMIN UR-MCNC: <0.5 MG/DL (ref 0–3)
MICROALBUMIN/CREAT UR-RTO: ABNORMAL MG/G (ref 0–30)
MONOCYTES # BLD: 0.9 K/UL (ref 0.05–1.2)
MONOCYTES NFR BLD: 6 % (ref 3–10)
MUCOUS THREADS URNS QL MICRO: ABNORMAL /LPF
NEUTS SEG # BLD: 11.7 K/UL (ref 1.8–8)
NEUTS SEG NFR BLD: 77 % (ref 40–73)
NITRITE UR QL STRIP.AUTO: NEGATIVE
PH UR STRIP: 7 [PH] (ref 5–8)
PHOSPHATE SERPL-MCNC: 2.9 MG/DL (ref 2.5–4.9)
PLATELET # BLD AUTO: 205 K/UL (ref 135–420)
PMV BLD AUTO: 11.4 FL (ref 9.2–11.8)
POTASSIUM SERPL-SCNC: 3.8 MMOL/L (ref 3.5–5.5)
PROT UR STRIP-MCNC: NEGATIVE MG/DL
PTH-INTACT SERPL-MCNC: 56.5 PG/ML (ref 18.4–88)
RBC # BLD AUTO: 4.44 M/UL (ref 4.2–5.3)
RBC #/AREA URNS HPF: ABNORMAL /HPF (ref 0–5)
SODIUM SERPL-SCNC: 140 MMOL/L (ref 136–145)
SP GR UR REFRACTOMETRY: 1.02 (ref 1–1.03)
UROBILINOGEN UR QL STRIP.AUTO: 0.2 EU/DL (ref 0.2–1)
WBC # BLD AUTO: 15.1 K/UL (ref 4.6–13.2)
WBC URNS QL MICRO: ABNORMAL /HPF (ref 0–4)

## 2018-11-26 PROCEDURE — 82306 VITAMIN D 25 HYDROXY: CPT

## 2018-11-26 PROCEDURE — 83970 ASSAY OF PARATHORMONE: CPT

## 2018-11-26 PROCEDURE — 85025 COMPLETE CBC W/AUTO DIFF WBC: CPT

## 2018-11-26 PROCEDURE — 82043 UR ALBUMIN QUANTITATIVE: CPT

## 2018-11-26 PROCEDURE — 80069 RENAL FUNCTION PANEL: CPT

## 2018-11-26 PROCEDURE — 36415 COLL VENOUS BLD VENIPUNCTURE: CPT

## 2018-11-26 PROCEDURE — 81001 URINALYSIS AUTO W/SCOPE: CPT

## 2018-12-07 ENCOUNTER — OFFICE VISIT (OUTPATIENT)
Dept: SURGERY | Age: 58
End: 2018-12-07

## 2018-12-07 VITALS
HEIGHT: 65 IN | RESPIRATION RATE: 16 BRPM | DIASTOLIC BLOOD PRESSURE: 74 MMHG | SYSTOLIC BLOOD PRESSURE: 120 MMHG | TEMPERATURE: 98.6 F | BODY MASS INDEX: 33.32 KG/M2 | HEART RATE: 84 BPM | OXYGEN SATURATION: 98 % | WEIGHT: 200 LBS

## 2018-12-07 DIAGNOSIS — R21 RASH DUE TO ALLERGY: ICD-10-CM

## 2018-12-07 DIAGNOSIS — Z09 POSTOPERATIVE EXAMINATION: Primary | ICD-10-CM

## 2018-12-07 DIAGNOSIS — T78.40XA RASH DUE TO ALLERGY: ICD-10-CM

## 2018-12-07 RX ORDER — ERGOCALCIFEROL 1.25 MG/1
1000 CAPSULE ORAL DAILY
COMMUNITY
Start: 2018-12-06

## 2018-12-07 NOTE — PROGRESS NOTES
Chief Complaint   Patient presents with    Post OP Follow Up     excisional bx soft tissue mass of the back 11/16/2018. PT states had allergic reaction to betadine that was around incision area but is healing now. 1. Have you been to the ER, urgent care clinic since your last visit? Hospitalized since your last visit? No    2. Have you seen or consulted any other health care providers outside of the [address] [place] [address] since your last visit? Include any pap smears or colon screening.  No

## 2018-12-07 NOTE — PROGRESS NOTES
Trinity Gonzalez. [place], FNP-C  PROGRESS NOTE        Subjective:  Ms. Marc Hall is a very pleasant 63 yo white female who presents today 2 weeks out from excisional biopsy of 2 soft tissue masses to right, lower back which pathology reveals to be lipomas. She has not been experiencing any pain and does not take medication for pain. Her only complaint is secondary to a rash she got from the betadine used for skin prep. She states she has gotten a rash from betadine before. It was very itchy initially, but has improved. She does not want any triamcinolone at this time. Objective:  Vitals:    12/07/18 1401   BP: 120/74   Pulse: 84   Resp: 16   Temp: 98.6 °F (37 °C)   TempSrc: Oral   SpO2: 98%   Weight: 90.7 kg (200 lb)   Height: 5' 5\" (1.651 m)       Physical Exam:    General: in no apparent distress, alert, oriented times 3, afebrile and cooperative   Incision:   healing well, no drainage, no erythema, no hernia, no seroma, no swelling, no dehiscence, incision well approximated. Pink, macular rash around both incisions circumferentially. No signs of infection. Current Medications:  Current Outpatient Medications   Medication Sig Dispense Refill    VITAMIN D2 50,000 unit capsule       pramipexole (MIRAPEX) 1 mg tablet TAKE 1 TABLET BY MOUTH THREE TIMES DAILY 180 Tab 3    traMADol (ULTRAM) 50 mg tablet Take 1 Tab by mouth every six (6) hours as needed for Pain. 40 Tab 0    EPINEPHrine (EPIPEN 2-PRITI) 0.3 mg/0.3 mL injection 0.3 mL by IntraMUSCular route once as needed for up to 1 dose. 2 Syringe 5    calcium-cholecalciferol, D3, (CALTRATE 600+D) tablet Take 1 Tab by mouth daily.  fish oil-dha-epa 1,200-144-216 mg cap Take 1 Cap by mouth daily.  HYDROcodone-acetaminophen (NORCO) 5-325 mg per tablet 1-2 tablets every 4-6 hour prn pain 30 Tab 0    docusate sodium (COLACE) 50 mg capsule Take 1 Cap by mouth two (2) times a day for 90 days. 60 Cap 2       Chart and notes reviewed. Data reviewed.  I have evaluated and examined the patient. Impression:    · Patient doing well overall 2 weeks out from surgical excision of 2 lipomas to right, lower back. She does have a resolving rash around both incision. Plan:  · May use vitamin E oil to incisions and surrounding skin  · Follow up as needed    Ms. Puma Contreras has a reminder for a \"due or due soon\" health maintenance. I have asked that she contact her primary care provider for follow-up on this health maintenance. Chetna Powell.  Collette Delay, FNP-C

## 2018-12-07 NOTE — PATIENT INSTRUCTIONS
Rash: Care Instructions  Your Care Instructions  A rash is any irritation or inflammation of the skin. Rashes have many possible causes, including allergy, infection, illness, heat, and emotional stress. Follow-up care is a key part of your treatment and safety. Be sure to make and go to all appointments, and call your doctor if you are having problems. It's also a good idea to know your test results and keep a list of the medicines you take. How can you care for yourself at home? · Wash the area with water only. Soap can make dryness and itching worse. Pat dry. · Put cold, wet cloths on the rash to reduce itching. · Keep cool, and stay out of the sun. · Leave the rash open to the air as much of the time as possible. · Sometimes petroleum jelly (Vaseline) can help relieve the discomfort caused by a rash. A moisturizing lotion, such as Cetaphil, also may help. Calamine lotion may help for rashes caused by contact with something (such as a plant or soap) that irritated the skin. Use it 3 or 4 times a day. · If your doctor prescribed a cream, use it as directed. If your doctor prescribed medicine, take it exactly as directed. · If your rash itches so badly that it interferes with your normal activities, take an over-the-counter antihistamine, such as diphenhydramine (Benadryl) or loratadine (Claritin). Read and follow all instructions on the label. When should you call for help? Call your doctor now or seek immediate medical care if:    · You have signs of infection, such as:  ? Increased pain, swelling, warmth, or redness. ? Red streaks leading from the area. ? Pus draining from the area. ? A fever.     · You have joint pain along with the rash.    Watch closely for changes in your health, and be sure to contact your doctor if:    · Your rash is changing or getting worse.  For example, call if you have pain along with the rash, the rash is spreading, or you have new blisters.     · You do not get better after 1 week. Where can you learn more? Go to http://mima-jose c.info/. Enter Q140 in the search box to learn more about \"Rash: Care Instructions. \"  Current as of: April 18, 2018  Content Version: 11.8  © 9772-6250 [employer], [employer]. Care instructions adapted under license by [employer] (which disclaims liability or warranty for this information). If you have questions about a medical condition or this instruction, always ask your healthcare professional. [address] any warranty or liability for your use of this information.

## 2019-01-02 ENCOUNTER — OFFICE VISIT (OUTPATIENT)
Dept: CARDIOLOGY CLINIC | Age: 59
End: 2019-01-02

## 2019-01-02 DIAGNOSIS — Z95.0 PACEMAKER: ICD-10-CM

## 2019-01-02 DIAGNOSIS — Z98.890 S/P ABLATION OPERATION FOR ARRHYTHMIA: Primary | ICD-10-CM

## 2019-01-02 DIAGNOSIS — Z86.79 S/P ABLATION OPERATION FOR ARRHYTHMIA: Primary | ICD-10-CM

## 2019-01-04 NOTE — PROGRESS NOTES
I have personally seen and evaluated the device findings. Interrogation reviewed and I agree with assessment.     Nicole Giron

## 2019-03-22 ENCOUNTER — HOSPITAL ENCOUNTER (OUTPATIENT)
Dept: LAB | Age: 59
Discharge: HOME | End: 2019-03-22
Payer: COMMERCIAL

## 2019-03-22 ENCOUNTER — TELEPHONE (OUTPATIENT)
Dept: INTERNAL MEDICINE CLINIC | Age: 59
End: 2019-03-22

## 2019-03-22 ENCOUNTER — APPOINTMENT (OUTPATIENT)
Dept: INTERNAL MEDICINE CLINIC | Age: 59
End: 2019-03-22

## 2019-03-22 DIAGNOSIS — E78.00 PURE HYPERCHOLESTEROLEMIA: ICD-10-CM

## 2019-03-22 DIAGNOSIS — E78.00 PURE HYPERCHOLESTEROLEMIA: Primary | ICD-10-CM

## 2019-03-22 DIAGNOSIS — E78.5 HYPERLIPIDEMIA, UNSPECIFIED HYPERLIPIDEMIA TYPE: ICD-10-CM

## 2019-03-22 DIAGNOSIS — R73.03 PREDIABETES: ICD-10-CM

## 2019-03-22 LAB
25(OH)D3 SERPL-MCNC: 60.4 NG/ML (ref 30–100)
CHOLEST SERPL-MCNC: 229 MG/DL
ERYTHROCYTE [DISTWIDTH] IN BLOOD BY AUTOMATED COUNT: 13.6 % (ref 11.6–14.5)
HBA1C MFR BLD: 6.3 % (ref 4.2–5.6)
HCT VFR BLD AUTO: 43.2 % (ref 35–45)
HDLC SERPL-MCNC: 70 MG/DL (ref 40–60)
HDLC SERPL: 3.3 {RATIO} (ref 0–5)
HGB BLD-MCNC: 14.1 G/DL (ref 12–16)
LDLC SERPL CALC-MCNC: 98.8 MG/DL (ref 0–100)
LIPID PROFILE,FLP: ABNORMAL
MCH RBC QN AUTO: 30.2 PG (ref 24–34)
MCHC RBC AUTO-ENTMCNC: 32.6 G/DL (ref 31–37)
MCV RBC AUTO: 92.5 FL (ref 74–97)
PLATELET # BLD AUTO: 236 K/UL (ref 135–420)
PMV BLD AUTO: 11.3 FL (ref 9.2–11.8)
RBC # BLD AUTO: 4.67 M/UL (ref 4.2–5.3)
TRIGL SERPL-MCNC: 301 MG/DL (ref ?–150)
VLDLC SERPL CALC-MCNC: 60.2 MG/DL
WBC # BLD AUTO: 8.5 K/UL (ref 4.6–13.2)

## 2019-03-22 PROCEDURE — 83036 HEMOGLOBIN GLYCOSYLATED A1C: CPT

## 2019-03-22 PROCEDURE — 36415 COLL VENOUS BLD VENIPUNCTURE: CPT

## 2019-03-22 PROCEDURE — 82306 VITAMIN D 25 HYDROXY: CPT

## 2019-03-22 PROCEDURE — 80061 LIPID PANEL: CPT

## 2019-03-22 PROCEDURE — 85027 COMPLETE CBC AUTOMATED: CPT

## 2019-03-31 NOTE — PROGRESS NOTES
62 y.o. WHITE OR  female who presents for f/u No other cardiovascular complaints. She's doing 15 min on the elliptical 5x/week on average. No toning otherwise No gu or gi complaints She's trying to eat better, cut back on the carbs and eating more fruit and vegetables. She's not able to do IF. Interested in being referred to a nutritionist and requesting another recheck of the thyroid labs Vitals 4/5/2019 12/7/2018 Weight 205 lb 200 lb Vitals 9/28/2018 4/16/2018 4/5/2018 3/29/2018 Weight 202 lb 205 lb 205 lb 204 lb She had lipoma removed by Dr Desirae Cardozo Wanting referral to audiologist for hearing loss. She worked with a lot or loud equipment like [employer] years back Past Medical History:  
Diagnosis Date  Agatston CAC score, <100 02/29/2016  
 ca score ZERO  Arrhythmia PVC 10% burden; s/p partial ablation Dr Gaytan Samples 2/16  Asthma   
 h/o exercise induced, denies 3/2/18  Cardiac echocardiogram 01/19/2016 EF 55-60%. No RWMA. Indeterminate diastolic fx. RVSP 26 mmHg. Mild MR.  Cardiac Holter monitor study 12/21/2015 Sinus rhythm, avg HR 82 bpm (range  bpm). Numerous PVCs. A few short runs of VT.  Cardiovascular LE venous duplex 07/08/2016 No DVT bilaterally.  Cholelithiasis   
 on US 2008, 4/17  Chronic back pain Dr. Cass Hale  Chronic venous insufficiency 2016 Dr General Allen  Colon polyp 03/05/2018 Dr Jeimy Osman; serrated polyp and divertics  De Quervain's tenosynovitis Dr Gauri Benson  Decreased GFR   
 saw Dr Rajan Armando  Diverticulitis 12/2017 CT proven  Dupuytren's contracture of both hands Dr Gauri Benson  FHx: heart disease  GERD (gastroesophageal reflux disease) 2/99  
 on UGI  History of palpitations 2005  Hyperlipidemia   
 calculated 10 year risk score was 1.8% (12/15); 2.2% (10/16); 2.3% (9/18)  Hypovitaminosis D  Myofacial pain   
 neck and thoracic  OA (osteoarthritis) lumbar DDD  Obesity   
 peak weight 200 lbs, bmi 33.3 from 5/13; qsymia ineffective, intol contrave, declined med supervised wt loss; 24h U petar nl; IF 3/18 start weight 204 lbs  Osteopenia 2007  
 t score -1 spine, -0.2 hip Dr. Cruz Barreto  Pericardial cyst 12/13  
 calcified on CT  
 Prediabetes 11/12  Restless legs syndrome  Tachycardia   
 neg eval Dr. Krystal Irizarry 2004  Tension headache  Thyroid nodule 8/12  
 3mm; 5/13, 12/13, 11/14, 1/16 no change  Trigger finger of both hands Dr Melissa Ann Past Surgical History:  
Procedure Laterality Date  CARDIAC SURG PROCEDURE UNLIST  2004 Holter negative  CARDIAC SURG PROCEDURE UNLIST  2004  
 echo nl lv, ef 65%  CARDIAC SURG PROCEDURE UNLIST  02/2016 Dr Kimberly De Los Santos; heart ablation  COLONOSCOPY N/A 3/5/2018 HBV 2011 negative; Dr Yanni Kyle 3/5/18 serrated polyp and divertics  HAND/FINGER SURGERY UNLISTED  12/2017 Dr Rick Garduno; left thumb cmc arthroplasty  HX BUNIONECTOMY  2005  
 bilateral  
 HX CARPAL TUNNEL RELEASE  12/08  HX CHOLECYSTECTOMY  05/05/2017  
 robotic single site cholecystectomy Dr Tripp Arzate  HX CYST REMOVAL  2007  
 right lumbar  HX LIPOMA RESECTION    
 x2 right low back  HX ORTHOPAEDIC    
 trigger finger release Dr Geovanna James  HX ORTHOPAEDIC  2018 Dr Rick Garduno finger surgery  HX PACEMAKER  02/28/2017 [employer]  AR EXC TUMOR SOFT TISS BACK/FLANK SUBFASCIAL <5CM N/A 11/23/2018 Dr. Jania Prajapati  AR EXCISION TUMOR SOFT TISSUE BACK/FLANK SUBQ 3+CM N/A 11/23/2018 Dr. Jania Prajapati  VASCULAR SURGERY PROCEDURE UNLIST  3/12  
 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  
Social Needs  Financial resource strain: Not on file  Food insecurity: Worry: Not on file Inability: Not on file  Transportation needs:  
  Medical: Not on file Non-medical: Not on file Tobacco Use  Smoking status: Former Smoker Packs/day: 1.00 Years: 20.00 Pack years: 20.00 Last attempt to quit: 1993 Years since quittin.2  Smokeless tobacco: Never Used  Tobacco comment:  Substance and Sexual Activity  Alcohol use: Yes Alcohol/week: 0.0 oz  
  Comment: rarely  Drug use: No  
 Sexual activity: Not on file Lifestyle  Physical activity:  
  Days per week: Not on file Minutes per session: Not on file  Stress: Not on file Relationships  Social connections:  
  Talks on phone: Not on file Gets together: Not on file Attends Sikh service: Not on file Active member of club or organization: Not on file Attends meetings of clubs or organizations: Not on file Relationship status: Not on file  Intimate partner violence:  
  Fear of current or ex partner: Not on file Emotionally abused: Not on file Physically abused: Not on file Forced sexual activity: Not on file Other Topics Concern  Not on file Social History Narrative  Not on file Current Outpatient Medications Medication Sig  VITAMIN D2 50,000 unit capsule  pramipexole (MIRAPEX) 1 mg tablet TAKE 1 TABLET BY MOUTH THREE TIMES DAILY (Patient taking differently: two tabs daily)  traMADol (ULTRAM) 50 mg tablet Take 1 Tab by mouth every six (6) hours as needed for Pain.  EPINEPHrine (EPIPEN 2-PRITI) 0.3 mg/0.3 mL injection 0.3 mL by IntraMUSCular route once as needed for up to 1 dose.  calcium-cholecalciferol, D3, (CALTRATE 600+D) tablet Take 1 Tab by mouth daily.  fish oil-dha-epa 1,200-144-216 mg cap Take 1 Cap by mouth daily. No current facility-administered medications for this visit. Allergies Allergen Reactions  Adhesive Tape-Silicones Contact Dermatitis  Bee Sting [Sting, Bee] Anaphylaxis  Benzoyl Peroxide Rash  Betadine [Povidone-Iodine] Rash  Chlorhexidine Towelette Rash  Codeine Nausea and Vomiting  Contrave [Naltrexone-Bupropion] Other (comments) Vision changes REVIEW OF SYSTEMS: mammo 4/17, gyn 3/17, colo 3/18 Dr Thad Dodge, [address] 12/15 Ophtho  no vision change or eye pain Oral  no mouth pain, tongue or tooth problems Ears  no hearing loss, ear pain, fullness, no swallowing problems Cardiac  no CP, PND, orthopnea Chest  no breast masses Resp  no wheezing, chronic coughing, dyspnea GI  no heartburn, nausea, vomiting, change in bowel habits, bleeding, hemorrhoids Urinary  no dysuria, hematuria, flank pain, urgency, frequency Visit Vitals /67 Pulse 96 Temp 98.2 °F (36.8 °C) (Oral) Resp 14 Ht 5' 5\" (1.651 m) Wt 205 lb (93 kg) SpO2 97% BMI 34.11 kg/m² Affect is appropriate. Mood stable No apparent distress HEENT --Anicteric sclerae, tympanic membranes normal,  ear canals normal. 
PERRL, EOMI, conjunctiva and lids normal.  Disks were sharp Sinuses were nontender, turbinates normal, hearing normal.  Oropharynx without 
erythema, normal tongue, oral mucosa and tonsils. No thyromegaly, JVD, or bruits. Lungs --Clear to auscultation, normal percussion. Heart --Regular rate and rhythm, no murmurs, rubs, gallops, or clicks. Chest wall --Nontender to palpation. PMI normal. 
Abdomen -- Soft and nontender, no hepatosplenomegaly or masses. Ext without c/c/e 
 
LABS From 3/12 showed   gluc 120, cr 0.72, gfr 97,                                                                                   wbc 6.3, hb 13.0, plt 230, tsh 0.69, lyme neg, ua neg From 11/12 showed gluc 105, cr 1.04, gfr 62,     hba1c 6.0,              chol 246, tg 306, hdl 61, ldl-c 124,                 tsh 1.21 From 5/13 showed                            chol 302. tg 281, hdl 60, ldl-c 186 From 11/13 showed gluc 105, cr 0.95, gfr 69,    hba1c 6.1, ldl-p 903, chol 247, tg 134, hdl 74, ldl-c 146 From 11/13 showed gluc 80,   cr 0.92, gfr 72,  alt 15,               chol 146, tg 85,   hdl 78, ldl-c 51,   wbc 5.9, hb 13.9, plt 238 From 11/14 showed gluc 96,   cr 0.92, gfr>60, alt<5,  hba1c 6.1,   chol 233, tg 174, hdl 75, ldl-c 123, wbc 5.7, hb 13.3, plt 204,           ua neg From 2/15 showed   gluc 94,   cr 0.98, gfr 59,  alt 7,   hba1c 5.8,              wbc 7.6, hb 13.7, plt 228, tsh 0.62,          ua neg From 6/15 showed        hba1c 5.8 From 12/15 showed gluc 96,   cr 0.94, gfr>60, alt 31,    chol 274, tg 209, hdl 91, ldl-c 141 From 6/16 showed   gluc 102, cr 0.89, gfr>60, alt 21, hba1c 6.1,   chol 319, tg 314, hdl 67, ldl-c 189, wbc 6.3, hb 12.5, plt 188, tsh 1.49, ft4 1.1,     tt3 86, tpo ab neg From 7/16 showed   gluc 102, cr 1.44, gfr 38,              24h U petar 18 From 9/16 showed                           ua neg From 10/16 showed gluc 103, cr 1.05, gfr 54,  alt 23, hba1c 5.4,   chol 292, tg 292, hdl 67, ldl-c 167 From 12/17 showed gluc 119, cr 1.01, gfr 57,  alt 33,               wbc 7.0, hb 13.5, plt 257, lip 170 From 3/18 showed   gluc 106, cr 0.98, gfr 59,  alt 16, hba1c 6.0,   chol 284, tg 259, hdl 80, ldl-c 152, wbc 8.3, hb 14.4, plt 216 From 11/18 showed gluc 121, cr 0.85, gfr>60,                wb 15.1, hb 13.5, plt 205,           umar neg, vit d 19.9 Results for orders placed or performed during the hospital encounter of 03/22/19 LIPID PANEL Result Value Ref Range LIPID PROFILE Cholesterol, total 229 (H) <200 MG/DL Triglyceride 301 (H) <150 MG/DL  
 HDL Cholesterol 70 (H) 40 - 60 MG/DL  
 LDL, calculated 98.8 0 - 100 MG/DL VLDL, calculated 60.2 MG/DL  
 CHOL/HDL Ratio 3.3 0 - 5.0 HEMOGLOBIN A1C W/O EAG Result Value Ref Range Hemoglobin A1c 6.3 (H) 4.2 - 5.6 % CBC W/O DIFF Result Value Ref Range WBC 8.5 4.6 - 13.2 K/uL RBC 4.67 4.20 - 5.30 M/uL  
 HGB 14.1 12.0 - 16.0 g/dL HCT 43.2 35.0 - 45.0 % MCV 92.5 74.0 - 97.0 FL  
 MCH 30.2 24.0 - 34.0 PG  
 MCHC 32.6 31.0 - 37.0 g/dL  
 RDW 13.6 11.6 - 14.5 % PLATELET 304 251 - 451 K/uL MPV 11.3 9.2 - 11.8 FL  
VITAMIN D, 25 HYDROXY Result Value Ref Range Vitamin D 25-Hydroxy 60.4 30 - 100 ng/mL Patient Active Problem List  
Diagnosis Code  Hyperlipidemia E78.5  Prediabetes R73.03  
 Gastroesophageal reflux disease without esophagitis K21.9  DDD (degenerative disc disease), lumbar M51.36  
 S/P ablation operation for arrhythmia Z98.890, Z86.79  
 Obesity (BMI 30-39. 9) E66.9  Varicose veins of both lower extremities I83.93  
 Pacemaker Z95.0  Seasonal allergic rhinitis J30.1  Mass of subcutaneous tissue of back R22.2 Assessment and plan: 1. Ortho. Per her specialist 
2. Obesity. Will send to nutritionist, recheck thyroids 3. GERD. PPI tx and avoidance measures prn 
4. Osteopenia. Ca/d, weight bearing exercise as tolerated 5. Hyperlipidemia. Lifestyle and dietary measures 6. H/O thyroid nodule. Stable nodule on f/u.  
7. Prediabetes. Lifestyle and dietary measures. Portion control reiterated, wt loss as above 8. S/P ablation and pacemaker placement. F/U Dr Dex Velarde 9. Polyps and diverticulosis. Fiber, colo 2023 
10. Hearing loss. Send to audiology RTC 10/19 Above conditions discussed at length and patient vocalized understanding. All questions answered to patient satisfaction

## 2019-04-05 ENCOUNTER — OFFICE VISIT (OUTPATIENT)
Dept: INTERNAL MEDICINE CLINIC | Age: 59
End: 2019-04-05

## 2019-04-05 ENCOUNTER — HOSPITAL ENCOUNTER (OUTPATIENT)
Dept: LAB | Age: 59
Discharge: HOME | End: 2019-04-05
Payer: COMMERCIAL

## 2019-04-05 VITALS
HEIGHT: 65 IN | HEART RATE: 96 BPM | OXYGEN SATURATION: 97 % | TEMPERATURE: 98.2 F | WEIGHT: 205 LBS | RESPIRATION RATE: 14 BRPM | SYSTOLIC BLOOD PRESSURE: 122 MMHG | DIASTOLIC BLOOD PRESSURE: 67 MMHG | BODY MASS INDEX: 34.16 KG/M2

## 2019-04-05 DIAGNOSIS — E55.9 HYPOVITAMINOSIS D: ICD-10-CM

## 2019-04-05 DIAGNOSIS — E78.00 PURE HYPERCHOLESTEROLEMIA: ICD-10-CM

## 2019-04-05 DIAGNOSIS — K21.9 GASTROESOPHAGEAL REFLUX DISEASE WITHOUT ESOPHAGITIS: ICD-10-CM

## 2019-04-05 DIAGNOSIS — Z98.890 S/P ABLATION OPERATION FOR ARRHYTHMIA: ICD-10-CM

## 2019-04-05 DIAGNOSIS — R73.03 PREDIABETES: ICD-10-CM

## 2019-04-05 DIAGNOSIS — E66.9 OBESITY (BMI 30-39.9): ICD-10-CM

## 2019-04-05 DIAGNOSIS — Z86.79 S/P ABLATION OPERATION FOR ARRHYTHMIA: ICD-10-CM

## 2019-04-05 DIAGNOSIS — H91.90 HEARING LOSS, UNSPECIFIED HEARING LOSS TYPE, UNSPECIFIED LATERALITY: ICD-10-CM

## 2019-04-05 DIAGNOSIS — Z95.0 PACEMAKER: ICD-10-CM

## 2019-04-05 DIAGNOSIS — E78.00 PURE HYPERCHOLESTEROLEMIA: Primary | ICD-10-CM

## 2019-04-05 PROBLEM — R22.2 MASS OF SUBCUTANEOUS TISSUE OF BACK: Status: RESOLVED | Noted: 2018-11-23 | Resolved: 2019-04-05

## 2019-04-05 LAB
T4 FREE SERPL-MCNC: 1 NG/DL (ref 0.7–1.5)
TSH SERPL DL<=0.05 MIU/L-ACNC: 0.85 UIU/ML (ref 0.36–3.74)

## 2019-04-05 PROCEDURE — 84443 ASSAY THYROID STIM HORMONE: CPT

## 2019-04-05 PROCEDURE — 84439 ASSAY OF FREE THYROXINE: CPT

## 2019-04-05 NOTE — PROGRESS NOTES
Chief Complaint Patient presents with  Cholesterol Problem 6 month follow up with labs 1. Have you been to the ER, urgent care clinic or hospitalized since your last visit? NO.  
 
2. Have you seen or consulted any other health care providers outside of the [address] [place] [address] since your last visit (Include any pap smears or colon screening)?  NO

## 2019-04-11 ENCOUNTER — CLINICAL SUPPORT (OUTPATIENT)
Dept: CARDIOLOGY CLINIC | Age: 59
End: 2019-04-11

## 2019-04-11 ENCOUNTER — OFFICE VISIT (OUTPATIENT)
Dept: CARDIOLOGY CLINIC | Age: 59
End: 2019-04-11

## 2019-04-11 VITALS
SYSTOLIC BLOOD PRESSURE: 120 MMHG | HEART RATE: 77 BPM | OXYGEN SATURATION: 98 % | BODY MASS INDEX: 33.61 KG/M2 | DIASTOLIC BLOOD PRESSURE: 78 MMHG | WEIGHT: 202 LBS

## 2019-04-11 DIAGNOSIS — I49.5 SSS (SICK SINUS SYNDROME) (HCC): ICD-10-CM

## 2019-04-11 DIAGNOSIS — I49.5 SSS (SICK SINUS SYNDROME) (HCC): Primary | ICD-10-CM

## 2019-04-11 DIAGNOSIS — Z86.79 S/P ABLATION OPERATION FOR ARRHYTHMIA: ICD-10-CM

## 2019-04-11 DIAGNOSIS — Z95.0 PACEMAKER: Primary | ICD-10-CM

## 2019-04-11 DIAGNOSIS — E78.5 HYPERLIPIDEMIA, UNSPECIFIED HYPERLIPIDEMIA TYPE: ICD-10-CM

## 2019-04-11 DIAGNOSIS — Z95.0 PACEMAKER: ICD-10-CM

## 2019-04-11 DIAGNOSIS — I49.3 PVC'S (PREMATURE VENTRICULAR CONTRACTIONS): ICD-10-CM

## 2019-04-11 DIAGNOSIS — Z98.890 S/P ABLATION OPERATION FOR ARRHYTHMIA: ICD-10-CM

## 2019-04-11 NOTE — PROGRESS NOTES
History of Present Illness:  A 62 y.o. female here for follow up. Overall, doing quite well. She is here for an annual follow up. She had a Medtronic pacemaker February 2017 for syncope and bradycardia after loop recorder implant. She denies any new chest pain, dyspnea, presyncope, syncope, PND, orthopnea or edema. She works out at the [employer] at times without any limitations. Impression/Plan:  
Medtronic dual chamber pacemaker February 2017 due to syncope and pauses via subcutaneous loop recorder. 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 Chronic pain and tension headaches. History of PVCs with ablation February 2016 without recurrence. Exercise-induced asthma. The pacemaker is functioning normally, incision is well healed. The device shows very rare events of tachycardia to 150 beats per minute which appears to be sinus. I will continue with regular device checks. Her blood pressure is controlled. I will see her back in a year. Past Medical History:  
Diagnosis Date  Agatston CAC score, <100 02/29/2016  
 ca score ZERO  Arrhythmia PVC 10% burden; s/p partial ablation Dr Ranulfo Calixto 2/16  Asthma   
 h/o exercise induced, denies 3/2/18  Cardiac echocardiogram 01/19/2016 EF 55-60%. No RWMA. Indeterminate diastolic fx. RVSP 26 mmHg. Mild MR.  Cardiac Holter monitor study 12/21/2015 Sinus rhythm, avg HR 82 bpm (range  bpm). Numerous PVCs. A few short runs of VT.  Cardiovascular LE venous duplex 07/08/2016 No DVT bilaterally.  Cholelithiasis   
 on US 2008, 4/17  Chronic back pain Dr. Evelyn Aviles  Chronic venous insufficiency 2016 Dr Radha Hale  Colon polyp 03/05/2018 Dr Priti Osborn; serrated polyp and divertics  De Quervain's tenosynovitis Dr Mittal Cornea  Decreased GFR   
 saw Dr Mary Mendoza  Diverticulitis 12/2017 CT proven  Dupuytren's contracture of both hands Dr Mittal Cornea  FHx: heart disease  GERD (gastroesophageal reflux disease) 2/99  
 on UGI  History of palpitations 2005  Hyperlipidemia   
 calculated 10 year risk score was 1.8% (12/15); 2.2% (10/16); 2.3% (9/18)  Hypovitaminosis D  Myofacial pain   
 neck and thoracic  OA (osteoarthritis)   
 lumbar DDD  Obesity   
 peak weight 200 lbs, bmi 33.3 from 5/13; qsymia ineffective, intol contrave, declined med supervised wt loss; 24h U petar nl; IF 3/18 start weight 204 lbs  Osteopenia 2007  
 t score -1 spine, -0.2 hip Dr. Shun Truong  Pericardial cyst 12/13  
 calcified on CT  
 Prediabetes 11/12  Restless legs syndrome  Tachycardia   
 neg eval Dr. Deuce Herrera 2004  Tension headache  Thyroid nodule 8/12  
 3mm; 5/13, 12/13, 11/14, 1/16 no change  Trigger finger of both hands Dr Arnaldo Grover Current Outpatient Medications Medication Sig Dispense Refill  VITAMIN D2 50,000 unit capsule  pramipexole (MIRAPEX) 1 mg tablet TAKE 1 TABLET BY MOUTH THREE TIMES DAILY (Patient taking differently: two tabs daily) 180 Tab 3  
 EPINEPHrine (EPIPEN 2-PRITI) 0.3 mg/0.3 mL injection 0.3 mL by IntraMUSCular route once as needed for up to 1 dose. 2 Syringe 5  
 calcium-cholecalciferol, D3, (CALTRATE 600+D) tablet Take 1 Tab by mouth daily.  fish oil-dha-epa 1,200-144-216 mg cap Take 1 Cap by mouth daily. Social History 
 reports that she quit smoking about 26 years ago. She has a 20.00 pack-year smoking history. She has never used smokeless tobacco. 
 reports that she drinks alcohol. Family History 
family history includes Alcohol abuse in her brother; Aldon Frisk in her brother, father, mother, and sister; Breast Cancer in her mother; Cancer in her mother; Diabetes in her father and mother; Elevated Lipids in her father and mother; Heart Disease in her father; Hypertension in her mother; Migraines in her sister; Stroke in her mother. Review of Systems Except as stated above include: 
Constitutional: Negative for fever, chills and malaise/fatigue. HEENT: No congestion or recent URI. Gastrointestinal: No nausea, vomiting, abdominal pain, bloody stools. Pulmonary:  Negative except as stated above. Cardiac:  Negative except as stated above. Musculoskeletal: Negative except as stated above. Neurological:  No localized symptoms. Skin:  Negative except as stated above. Psych:  Negative except as stated above. Endocrine:  Negative except as stated above. PHYSICAL EXAM 
BP Readings from Last 3 Encounters:  
04/11/19 120/78  
04/05/19 122/67  
12/07/18 120/74 Pulse Readings from Last 3 Encounters:  
04/11/19 77  
04/05/19 96  
12/07/18 84 Wt Readings from Last 3 Encounters:  
04/11/19 91.6 kg (202 lb) 04/05/19 93 kg (205 lb) 12/07/18 90.7 kg (200 lb) General:   Well developed, well groomed. Head/Neck:   No jugular venous distention No carotid bruits. No evidence of xanthelasma. Lungs:   No respiratory distress. Clear bilaterally. Heart:    Regular rate and rhythm. Normal S1/S2. Palpation of heart with normal point of maximum impulse. No significant murmurs, rubs or gallops. Abdomen:   Soft and nontender. No palpable abdominal mass or bruits. Extremities:   Intact peripheral pulses. No significant edema. Neurological:   Alert and oriented to person, place, time. No focal neurological deficit visually. Skin:   No obvious rash Blood Pressure Metric: 
Alberto has been given the following recommendations today due to her elevated BP reading: controlled

## 2019-04-12 ENCOUNTER — TELEPHONE (OUTPATIENT)
Dept: INTERNAL MEDICINE CLINIC | Age: 59
End: 2019-04-12

## 2019-04-12 NOTE — PROGRESS NOTES
I have personally seen and evaluated the device findings. Interrogation reviewed and I agree with assessment.     Lizette Bowman

## 2019-04-12 NOTE — TELEPHONE ENCOUNTER
Regarding: Referral Request  Contact: [phone]  ----- Message from [address] [place] [address] [address], Generic sent at 4/12/2019  1:23 PM EDT -----    Last Friday when I met with Dr. Gay Calixto, we discussed referrals to have my hearing checked and to see a nutritionist.  I haven't heard anything about either and wanted to see if there were any updates. Thank you!   Huber Zamora  [phone]

## 2019-04-12 NOTE — TELEPHONE ENCOUNTER
Called and spoke to patient about mychart message below. Advised patient of nutrition referral and the contact number of [phone] to see status or to schedule an appointment. Then for the hearing loss we referred to [facility] and their contact number: Phone: [phone]. Advised patient to give them a call next week if she doesn't hear from them. Patient understood.

## 2019-05-14 ENCOUNTER — HOSPITAL ENCOUNTER (OUTPATIENT)
Dept: MAMMOGRAPHY | Age: 59
Discharge: HOME | End: 2019-05-14
Attending: INTERNAL MEDICINE
Payer: COMMERCIAL

## 2019-05-14 DIAGNOSIS — Z12.31 VISIT FOR SCREENING MAMMOGRAM: ICD-10-CM

## 2019-05-14 PROCEDURE — 77063 BREAST TOMOSYNTHESIS BI: CPT

## 2019-05-15 ENCOUNTER — HOSPITAL ENCOUNTER (OUTPATIENT)
Dept: NUTRITION | Age: 59
Discharge: HOME | End: 2019-05-15
Payer: COMMERCIAL

## 2019-05-15 PROCEDURE — 97802 MEDICAL NUTRITION INDIV IN: CPT

## 2019-05-15 NOTE — PROGRESS NOTES
[address] [place] [address], [address] [facility], [address],[place] [address] Phone: [phone]  Fax: [phone] Nutrition Assessment  Medical Nutrition Therapy Outpatient Initial Evaluation Patient Name: Zofia Pelayo : 1960 Treatment Diagnosis: Predm, Obesity, high trig Referral Source: Dorina Kanner, MD Start of Care [facility]): 5/15/2019 Gender: female Age: 62 y.o. Ht: 65 in Wt:  201.6 lb  kg BMI: 33.5 BMR Male  [facility] Female Anthropometrics Assessment: Per BMI, pt is considered morbidly obese. Moderate abdominal adiposity is evident based on visual observation Past Medical History includes: N/A Pertinent Medications:  
Vitamins Biochemical Data:  
Lab Results Component Value Date/Time Hemoglobin A1c 6.3 (H) 2019 08:08 AM  
 
Lab Results Component Value Date/Time Cholesterol, total 229 (H) 2019 08:08 AM  
 HDL Cholesterol 70 (H) 2019 08:08 AM  
 LDL, calculated 98.8 2019 08:08 AM  
 VLDL, calculated 60.2 2019 08:08 AM  
 Triglyceride 301 (H) 2019 08:08 AM  
 CHOL/HDL Ratio 3.3 2019 08:08 AM  
 
Lab Results Component Value Date/Time ALT (SGPT) 16 2018 08:35 AM  
 AST (SGOT) 16 2018 08:35 AM  
 Alk. phosphatase 80 2018 08:35 AM  
 Bilirubin, total 0.4 2018 08:35 AM  
 
Lab Results Component Value Date/Time Creatinine, POC 1.7 (H) 2016 07:03 PM  
 Creatinine 0.85 2018 03:47 PM  
 
Lab Results Component Value Date/Time BUN 18 2018 03:47 PM  
 
Lab Results Component Value Date/Time Microalbumin/Creat ratio (mg/g creat)  2018 04:00 PM  
  Cannot calculate ratio due to microalbumin result outside reportable range.   
 Microalbumin,urine random <0.50 2018 04:00 PM  
  
 
Subjective/Assessment: 
 Pt is here seeking guidance and education for weight loss and blood sugar control. She asked for RD referral as she has tried various dieting techniques and none have offered long-term, sustainability. She wants to lose weight and get out of the risk category for Diabetes. Her cholesterol is elevated due to her Triglycerides. Anticipate this is due to her high carb food choices, not cooking as much at home, and choosing high carb, high fat, high sodium foods at restaurants. Reviewed pts current dietary pattern as well as exercise and hydration. Provided education, set goals, and answered all questions. Pt expressed comprehension, high motivation, and compliance is expected. Current Eating Patterns: See Notes in Chart Estimate Needs Calories: 1500  Protein: 113 Carbs: 150 Fat: 50  
Kcal/day  g/day  g/day  g/day   
    percent: 30  40  30 Education & Recommendations provided: Educated pt on the pathophysiology of Type II Diabetes, insulin resistance and the rationale for dietary modifications and increased activity. Educated pt on carbohydrate food sources, counting carbohydrates, label reading, meal timing, and appropriate serving sizes. Encouraged pt to avoid sugary beverages. Handouts Provided: [x]  Carbohydrates [x]  Protein []  Fiber 
[x]  Serving Sizes [x]  Meal and Snack Ideas 
[]  Food Journals []  Diabetes []  Cholesterol 
[]  Sodium 
[]  Gen Nutr Guidelines 
[]  SBGM Guidelines 
[]  Others:  
Information Reviewed with: Patient Readiness to Change Stage: []  Pre-contemplative    []  Contemplative 
[]  Preparation               [x]  Action                  []  Maintenance Potential Barriers to Learning: []  Decline in memory    []  Language barrier   []  Other: 
[]  Emotional                  []  Limited mobility 
[]  Lack of motivation     [] Vision, hearing or cognitive impairment Expected Compliance: Good Nutritional Goal - To promote lifestyle changes to result in:   
[x]  Weight loss [x]  Improved diabetic control [x]  Decreased cholesterol levels 
[]  Decreased blood pressure 
[]  Weight maintenance []  Preventing any interactions associated with food allergies []  Adequate weight gain toward goal weight 
[]  Other:  
  
 
Patient Goals: SMART goals 1. Follow consistent and appropriate meal timing; follow a structured timeline, space meals by 4-5 hours with snacks between if going longer than 5 hours 2. Focus on good sources of protein; Never eat carbs alone, always pair them with protein, 
 3. Have a plan; use the meal builder tool + diet plan for good portion contol and balance, plan out meals/snacks ahead of time, and create shopping lists 4. Exercise for 150 min each week divided as schedule permits. Dietitian Signature: Keith Aviles MA, RD Date: 5/15/2019 Follow-up: 26 June @ 1000 Time: 8:58 AM

## 2019-06-26 ENCOUNTER — HOSPITAL ENCOUNTER (OUTPATIENT)
Dept: NUTRITION | Age: 59
Discharge: HOME | End: 2019-06-26
Payer: COMMERCIAL

## 2019-06-26 PROCEDURE — 97803 MED NUTRITION INDIV SUBSEQ: CPT

## 2019-06-26 NOTE — PROGRESS NOTES
NUTRITION  FOLLOW-UP TREATMENT NOTE  Patient Name: Silvia Stephenson         Date: 2019  : 1960    YESPatient  Verified  Diagnosis: high cholesterol, predm, obesity   In time:   1000             Out time:   1030   Total Treatment Time (min):   30     SUBJECTIVE/ASSESSMENT    Changes in medication or medical history? Any new allergies, surgeries or procedures? NO    If yes, update Summary List   No change         Current Wt: 186.8 Previous Wt: 201.6 Wt Change: -14.2     Achievement of Goals: Pt has been a work in progress. She has been making small changes such as no soda, no candy, making healthier choices when going out to eat. She is cooking a bit more, but her  and son are working out of state therefore she finds it difficult yo cook a meal for just herself. She does think she may meal prep so she can have that as  An option instead of eating out so much. She has been working on house repairs (ripping up carpet, etc and yard work) As they may be moving to [place] at the end of the summer. She admits she does skip breakfast at times still, but is less frequent with this. Discussed meal ideas as well as tips for meal prep. Will continue to follow progress.          Patient Education:  [x]  Review current plan with patient   []  Other:    Handouts/  Information Provided: []  Carbohydrates  []  Protein  []  Fiber  []  Serving Sizes  []  Fluids  []  General guidelines []  Diabetes  []  Cholesterol  []  Sodium  []  SBGM  []  Food Journals  []  Others:      New Patient Goals: - Try meal prep at the beginning of the week to provided lunch and dinner options  - Don't skip meals(breakfast), keep working on this  -Try mixing unflavored or vanilla protein powder with flour (half and half) when baking     PLAN    [x]  Continue on current plan []  Follow-up PRN   []  Discharge due to :    [x]  Next appt:  @ 36     Dietitian: Valencia Anderson MA, RD    Date: 2019 Time: 9:51 AM

## 2019-07-17 ENCOUNTER — OFFICE VISIT (OUTPATIENT)
Dept: CARDIOLOGY CLINIC | Age: 59
End: 2019-07-17

## 2019-07-17 DIAGNOSIS — Z95.0 PACEMAKER: Primary | ICD-10-CM

## 2019-07-17 DIAGNOSIS — I49.5 SSS (SICK SINUS SYNDROME) (HCC): ICD-10-CM

## 2019-07-19 NOTE — PROGRESS NOTES
I have personally seen and evaluated the device findings. Interrogation reviewed and I agree with assessment.     Donya Conti

## 2019-07-24 NOTE — PROGRESS NOTES
I have personally seen and evaluated the device findings. Interrogation reviewed and I agree with assessment.     Jasmin العراقي

## 2019-07-26 ENCOUNTER — HOSPITAL ENCOUNTER (OUTPATIENT)
Dept: NUTRITION | Age: 59
Discharge: HOME | End: 2019-07-26
Payer: COMMERCIAL

## 2019-07-26 PROCEDURE — 97803 MED NUTRITION INDIV SUBSEQ: CPT

## 2019-07-26 NOTE — PROGRESS NOTES
NUTRITION  FOLLOW-UP TREATMENT NOTE  Patient Name: Arlene Houston         Date: 2019  : 1960    YES  Patient  Verified  Diagnosis: preDM, high chol   In time:   1130           Out time:   1200   Total Treatment Time (min):   30     SUBJECTIVE/ASSESSMENT    Changes in medication or medical history? Any new allergies, surgeries or procedures? NO    If yes, update Summary List   No change         Current Wt: 181.4 Previous Wt: 186. 8 Wt Change: -5.4     Achievement of Goals: Pt is doing great! She visually looks leaner and states she has lost inches. She is steadily losing weight. Discussed natural/organic options for nutritionally balance breakfast drinks and pt eat well balance meals for lunch and dinner. She continues to do yardwork as exercise and states she has more energy. Will continue to follow until next appointment for A1C check until Novemebr if all is well than will space appointments as PRN.          Patient Education:  [x]  Review current plan with patient   []  Other:    Handouts/  Information Provided: []  Carbohydrates  []  Protein  []  Fiber  []  Serving Sizes  []  Fluids  []  General guidelines []  Diabetes  []  Cholesterol  []  Sodium  []  SBGM  []  Food Journals  []  Others:      New Patient Goals: - nutritional drink for breakfast: Orgain pre-made, use garden of life sport powder or orgain powder in smoothies  - Add avocado to may to incorporate healthier fats     PLAN    [x]  Continue on current plan []  Follow-up PRN   []  Discharge due to :    [x]  Next appt:  @ 36     Dietitian: Leticia Campos MA, RD    Date: 2019 Time: 12:00 PM

## 2019-09-17 ENCOUNTER — HOSPITAL ENCOUNTER (OUTPATIENT)
Dept: NUTRITION | Age: 59
Discharge: HOME | End: 2019-09-17
Payer: COMMERCIAL

## 2019-09-17 PROCEDURE — 97803 MED NUTRITION INDIV SUBSEQ: CPT

## 2019-09-17 NOTE — PROGRESS NOTES
NUTRITION  FOLLOW-UP TREATMENT NOTE  Patient Name: Trina Donaldson         Date: 2019  : 1960    YES/ Patient  Verified  Diagnosis: Prediabetes, high chol   In time:   1130            Out time:   1200   Total Treatment Time (min):   30     SUBJECTIVE/ASSESSMENT    Changes in medication or medical history? Any new allergies, surgeries or procedures? NO    If yes, update Summary List   No Change          Current Wt: 177 Previous Wt: 181. 4 Wt Change: -4.5     Achievement of Goals: Pt has been doing great since her last appointment. She stated her  and son were home for 10 days and  wanted to go out to eat BBQ and [place] the whole time. Since he went back to [place] she has got back on track. Her son is back with her and they meal prep together. She has been more active with laborious projects as well as yardwork. SHe is down 2 shirt sizes and even more pant sizes. She is feeling healthier and notices the inches she has lost. She thinks her weight loss has inspired her  to start working on his health as well. Reviewed information and will space out pt visits as she is doing very well. Patient Education:  [x]  Review current plan with patient   []  Other:    Handouts/  Information Provided: []  Carbohydrates  []  Protein  []  Fiber  []  Serving Sizes  []  Fluids  []  General guidelines []  Diabetes  []  Cholesterol  []  Sodium  []  SBGM  []  Food Journals  []  Others:      New Patient Goals: -  Continue getting back and staying on track, use the tools you have  -Continue being amazing!        PLAN    []  Continue on current plan []  Follow-up PRN   []  Discharge due to :    [x]  Next appt: 11 Dec @ 0806     Dietitian: Marly Hoyt MA, RD    Date: 2019 Time: 11:35 AM

## 2019-10-04 ENCOUNTER — HOSPITAL ENCOUNTER (OUTPATIENT)
Dept: LAB | Age: 59
Discharge: HOME | End: 2019-10-04
Payer: COMMERCIAL

## 2019-10-04 ENCOUNTER — APPOINTMENT (OUTPATIENT)
Dept: INTERNAL MEDICINE CLINIC | Age: 59
End: 2019-10-04

## 2019-10-04 DIAGNOSIS — E55.9 HYPOVITAMINOSIS D: ICD-10-CM

## 2019-10-04 DIAGNOSIS — R73.03 PREDIABETES: ICD-10-CM

## 2019-10-04 LAB
ANION GAP SERPL CALC-SCNC: 8 MMOL/L (ref 3–18)
BUN SERPL-MCNC: 21 MG/DL (ref 7–18)
BUN/CREAT SERPL: 22 (ref 12–20)
CALCIUM SERPL-MCNC: 8.9 MG/DL (ref 8.5–10.1)
CHLORIDE SERPL-SCNC: 103 MMOL/L (ref 100–111)
CO2 SERPL-SCNC: 28 MMOL/L (ref 21–32)
CREAT SERPL-MCNC: 0.94 MG/DL (ref 0.6–1.3)
GLUCOSE SERPL-MCNC: 97 MG/DL (ref 74–99)
HBA1C MFR BLD: 5.9 % (ref 4.2–5.6)
POTASSIUM SERPL-SCNC: 3.8 MMOL/L (ref 3.5–5.5)
SODIUM SERPL-SCNC: 139 MMOL/L (ref 136–145)

## 2019-10-04 PROCEDURE — 82306 VITAMIN D 25 HYDROXY: CPT

## 2019-10-04 PROCEDURE — 83036 HEMOGLOBIN GLYCOSYLATED A1C: CPT

## 2019-10-04 PROCEDURE — 80048 BASIC METABOLIC PNL TOTAL CA: CPT

## 2019-10-04 PROCEDURE — 36415 COLL VENOUS BLD VENIPUNCTURE: CPT

## 2019-10-05 LAB — 25(OH)D3 SERPL-MCNC: 26.8 NG/ML (ref 30–100)

## 2019-10-06 NOTE — PROGRESS NOTES
62 y.o. WHITE OR  female who presents for f/u    No other cardiovascular complaints. She's doing up to 30 min on the elliptical but not on a regular basis. Sees Dr Marisela Griffin once yearly    No gu or gi complaints    She started going to [address] and is eating a lot better with choices and portions and has lost a great deal of weight as below! Vitals 10/11/2019 4/11/2019 4/5/2019 12/7/2018   Weight 179 lb 202 lb 205 lb 200 lb     The CTS is acting up but nothing she wants anything for at this time    Reports that she might be moving to [employer] as her  seems to be spending more time there with his job than here    Past Medical History:   Diagnosis Date    Agatston CAC score, <100 02/29/2016    ca score ZERO    Arrhythmia     PVC 10% burden; s/p partial ablation Dr Marisela Griffin 2/16    Asthma     h/o exercise induced, denies 3/2/18    Cardiac echocardiogram 01/19/2016    EF 55-60%. No RWMA. Indeterminate diastolic fx. RVSP 26 mmHg. Mild MR.  Cardiac Holter monitor study 12/21/2015    Sinus rhythm, avg HR 82 bpm (range  bpm). Numerous PVCs. A few short runs of VT.  Cardiovascular LE venous duplex 07/08/2016    No DVT bilaterally.     Cholelithiasis     on US 2008, 4/17    Chronic back pain     Dr. Ladell Brittle    Chronic venous insufficiency 2016    Dr Zach Rivera polyp 03/05/2018    Dr Lisette Calixto; serrated polyp and divertics    De Quervain's tenosynovitis     Dr Brent Fairbanks Decreased GFR     saw Dr Jefry Hathaway    Diverticulitis 12/2017    CT proven    Dupuytren's contracture of both hands     Dr Brent Fairbanks FHx: heart disease     GERD (gastroesophageal reflux disease) 2/99    on UGI    History of palpitations 2005    Hyperlipidemia     calculated 10 year risk score was 1.8% (12/15); 2.2% (10/16); 2.3% (9/18)    Hypovitaminosis D     Myofacial pain     neck and thoracic    OA (osteoarthritis)     lumbar DDD    Obesity     peak weight 200 lbs, bmi 33.3 from 5/13; qsymia ineffective, intol contrave, declined med supervised wt loss; 24h U petar nl; IF 3/18 start weight 204 lbs    Osteopenia 2007    t score -1 spine, -0.2 hip Dr. Lord Ask Pericardial cyst 12/13    calcified on CT    Prediabetes 11/12    Restless legs syndrome     Tachycardia     neg eval Dr. Koki Ramos 2004    Tension headache     Thyroid nodule 8/12    3mm; 5/13, 12/13, 11/14, 1/16 no change    Trigger finger of both hands     Dr Marcellus Ellis     Past Surgical History:   Procedure Laterality Date    CARDIAC SURG PROCEDURE UNLIST  2004    Holter negative    CARDIAC SURG PROCEDURE UNLIST  2004    echo nl lv, ef 65%    CARDIAC SURG PROCEDURE UNLIST  02/2016    Dr Jordan Brothers; heart ablation    COLONOSCOPY N/A 3/5/2018    HBV 2011 negative; Dr Kady Coker 3/5/18 serrated polyp and divertics    HAND/FINGER SURGERY UNLISTED  12/2017    Dr Ninoska Hensley; left thumb cmc arthroplasty    HX BUNIONECTOMY  2005    bilateral    HX CARPAL TUNNEL RELEASE  12/08    HX CHOLECYSTECTOMY  05/05/2017    robotic single site cholecystectomy Dr Stewart Potts  2007    right lumbar     HX LIPOMA RESECTION      x2 right low back    HX ORTHOPAEDIC      trigger finger release Dr Ewelina Gaviria  2018    Dr Ninoska Hensley finger surgery    HX PACEMAKER  02/28/2017    [employer]    CA EXC TUMOR SOFT TISS BACK/FLANK SUBFASCIAL <5CM N/A 11/23/2018    Dr. Michele Jason TISSUE BACK/FLANK SUBQ 3+CM N/A 11/23/2018    Dr. 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   Social Needs    Financial resource strain: Not on file    Food insecurity:     Worry: Not on file     Inability: Not on file    Transportation needs:     Medical: Not on file     Non-medical: Not on file   Tobacco Use    Smoking status: Former Smoker     Packs/day: 1.00     Years: 20.00     Pack years: 20.00     Last attempt to quit: 1993     Years since quittin.7    Smokeless tobacco: Never Used    Tobacco comment:    Substance and Sexual Activity    Alcohol use: Yes     Alcohol/week: 0.0 standard drinks     Comment: rarely    Drug use: No    Sexual activity: Not on file   Lifestyle    Physical activity:     Days per week: Not on file     Minutes per session: Not on file    Stress: Not on file   Relationships    Social connections:     Talks on phone: Not on file     Gets together: Not on file     Attends [employer] service: Not on file     Active member of club or organization: Not on file     Attends meetings of clubs or organizations: Not on file     Relationship status: Not on file    Intimate partner violence:     Fear of current or ex partner: Not on file     Emotionally abused: Not on file     Physically abused: Not on file     Forced sexual activity: Not on file   Other Topics Concern    Not on file   Social History Narrative    Not on file     Current Outpatient Medications   Medication Sig    pramipexole (MIRAPEX) 1 mg tablet TAKE 1 TABLET BY MOUTH THREE TIMES DAILY (Patient taking differently: Indications: 2 tabs one time daily)    EPINEPHrine (EPIPEN 2-PRITI) 0.3 mg/0.3 mL injection 0.3 mL by IntraMUSCular route once as needed for up to 1 dose.  calcium-cholecalciferol, D3, (CALTRATE 600+D) tablet Take 1 Tab by mouth daily.  fish oil-dha-epa 1,200-144-216 mg cap Take 1 Cap by mouth daily.  VITAMIN D2 50,000 unit capsule      No current facility-administered medications for this visit.       Allergies   Allergen Reactions    Adhesive Tape-Silicones Contact Dermatitis    Bee Sting [Sting, Bee] Anaphylaxis    Benzoyl Peroxide Rash    Betadine [Povidone-Iodine] Rash    Chlorhexidine Towelette Rash    Codeine Nausea and Vomiting  Contrave [Naltrexone-Bupropion] Other (comments)     Vision changes     REVIEW OF SYSTEMS: mammo 5/19, gyn 3/17, colo 3/18 Dr Brendan Hudson, [address] 12/15  Ophtho  no vision change or eye pain  Oral  no mouth pain, tongue or tooth problems  Ears  no hearing loss, ear pain, fullness, no swallowing problems  Cardiac  no CP, PND, orthopnea  Chest  no breast masses  Resp  no wheezing, chronic coughing, dyspnea  GI  no heartburn, nausea, vomiting, change in bowel habits, bleeding, hemorrhoids  Urinary  no dysuria, hematuria, flank pain, urgency, frequency    Visit Vitals  /78 (BP 1 Location: Left arm, BP Patient Position: Sitting)   Pulse 90   Temp 98.4 °F (36.9 °C) (Oral)   Resp 16   Ht 5' 5\" (1.651 m)   Wt 179 lb (81.2 kg)   SpO2 98%   BMI 29.79 kg/m²   A&O x3  Affect is appropriate. Mood stable  No apparent distress  Anicteric, no JVD, adenopathy or thyromegaly. No carotid bruits or radiated murmur  Lungs clear to auscultation, no wheezes or rales  Heart showed regular rate and rhythm. No murmur, rubs, gallops  Abdomen soft nontender, no hepatosplenomegaly or masses. Extremities without edema. Pulses 1-2+ symmetrically    LABS  From 3/12 showed   gluc 120, cr 0.72, gfr 97,                                                                                   wbc 6.3, hb 13.0, plt 230, tsh 0.69, lyme neg, ua neg  From 11/12 showed gluc 105, cr 1.04, gfr 62,     hba1c 6.0,              chol 246, tg 306, hdl 61, ldl-c 124,                 tsh 1.21  From 5/13 showed                            chol 302.  tg 281, hdl 60, ldl-c 186  From 11/13 showed gluc 105, cr 0.95, gfr 69,    hba1c 6.1, ldl-p 903, chol 247, tg 134, hdl 74, ldl-c 146  From 11/13 showed gluc 80,   cr 0.92, gfr 72,  alt 15,               chol 146, tg 85,   hdl 78, ldl-c 51,   wbc 5.9, hb 13.9, plt 238  From 11/14 showed gluc 96,   cr 0.92, gfr>60, alt<5,  hba1c 6.1,   chol 233, tg 174, hdl 75, ldl-c 123, wbc 5.7, hb 13.3, plt 204,           ua neg  From 2/15 showed   gluc 94,   cr 0.98, gfr 59,  alt 7,   hba1c 5.8,              wbc 7.6, hb 13.7, plt 228, tsh 0.62,          ua neg  From 6/15 showed        hba1c 5.8  From 12/15 showed gluc 96,   cr 0.94, gfr>60, alt 31,    chol 274, tg 209, hdl 91, ldl-c 141  From 6/16 showed   gluc 102, cr 0.89, gfr>60, alt 21, hba1c 6.1,   chol 319, tg 314, hdl 67, ldl-c 189, wbc 6.3, hb 12.5, plt 188, tsh 1.49, ft4 1.1,     tt3 86, tpo ab neg  From 7/16 showed   gluc 102, cr 1.44, gfr 38,              24h U petar 18  From 9/16 showed                           ua neg  From 10/16 showed gluc 103, cr 1.05, gfr 54,  alt 23, hba1c 5.4,   chol 292, tg 292, hdl 67, ldl-c 167  From 12/17 showed gluc 119, cr 1.01, gfr 57,  alt 33,               wbc 7.0, hb 13.5, plt 257, lip 170  From 3/18 showed   gluc 106, cr 0.98, gfr 59,  alt 16, hba1c 6.0,   chol 284, tg 259, hdl 80, ldl-c 152, wbc 8.3, hb 14.4, plt 216  From 11/18 showed gluc 121, cr 0.85, gfr>60,                wb 15.1, hb 13.5, plt 205,           umar neg, vit d 19.9  From 4/19 showed       hba1c 6.3,   chol 229, tg 331, hdl 70, ldl-c 99,   wbc 8.5, hb 14.1, plt 236, vit d 60.4    Results for orders placed or performed during the hospital encounter of 27/67/78   METABOLIC PANEL, BASIC   Result Value Ref Range    Sodium 139 136 - 145 mmol/L    Potassium 3.8 3.5 - 5.5 mmol/L    Chloride 103 100 - 111 mmol/L    CO2 28 21 - 32 mmol/L    Anion gap 8 3.0 - 18 mmol/L    Glucose 97 74 - 99 mg/dL    BUN 21 (H) 7.0 - 18 MG/DL    Creatinine 0.94 0.6 - 1.3 MG/DL    BUN/Creatinine ratio 22 (H) 12 - 20      GFR est AA >60 >60 ml/min/1.73m2    GFR est non-AA >60 >60 ml/min/1.73m2    Calcium 8.9 8.5 - 10.1 MG/DL   VITAMIN D, 25 HYDROXY   Result Value Ref Range    Vitamin D 25-Hydroxy 26.8 (L) 30 - 100 ng/mL   HEMOGLOBIN A1C W/O EAG   Result Value Ref Range    Hemoglobin A1c 5.9 (H) 4.2 - 5.6 %     Patient Active Problem List   Diagnosis Code    Hyperlipidemia E78.5    Prediabetes R73.03  Gastroesophageal reflux disease without esophagitis K21.9    DDD (degenerative disc disease), lumbar M51.36    S/P ablation operation for arrhythmia Z98.890, Z86.79    Obesity (BMI 30-39. 9) E66.9    Varicose veins of both lower extremities I83.93    Pacemaker Z95.0    Seasonal allergic rhinitis J30.1    Hypovitaminosis D E55.9     Assessment and plan:  1. Ortho. Per her specialist  2. Obesity. Will send to nutritionist, recheck thyroids  3. GERD. PPI tx and avoidance measures prn  4. Osteopenia. Ca/d, weight bearing exercise as tolerated  5. Hyperlipidemia. Lifestyle and dietary measures  6. H/O thyroid nodule. Stable nodule on f/u.   7. Prediabetes. Lifestyle and dietary measures. Portion control reiterated, wt loss as above  8. S/P ablation and pacemaker placement. F/U Dr Yoana Greene  9. Polyps and diverticulosis. Fiber, colo 2023  10. Hearing loss. Send to audiology        RTC 10/19    Above conditions discussed at length and patient vocalized understanding.   All questions answered to patient satisfaction

## 2019-10-11 ENCOUNTER — OFFICE VISIT (OUTPATIENT)
Dept: INTERNAL MEDICINE CLINIC | Age: 59
End: 2019-10-11

## 2019-10-11 VITALS
WEIGHT: 179 LBS | TEMPERATURE: 98.4 F | BODY MASS INDEX: 29.82 KG/M2 | HEART RATE: 90 BPM | SYSTOLIC BLOOD PRESSURE: 124 MMHG | HEIGHT: 65 IN | OXYGEN SATURATION: 98 % | RESPIRATION RATE: 16 BRPM | DIASTOLIC BLOOD PRESSURE: 78 MMHG

## 2019-10-11 DIAGNOSIS — I83.93 VARICOSE VEINS OF BOTH LOWER EXTREMITIES, UNSPECIFIED WHETHER COMPLICATED: ICD-10-CM

## 2019-10-11 DIAGNOSIS — E66.3 OVERWEIGHT (BMI 25.0-29.9): ICD-10-CM

## 2019-10-11 DIAGNOSIS — E78.5 HYPERLIPIDEMIA, UNSPECIFIED HYPERLIPIDEMIA TYPE: ICD-10-CM

## 2019-10-11 DIAGNOSIS — Z95.0 PACEMAKER: ICD-10-CM

## 2019-10-11 DIAGNOSIS — K21.9 GASTROESOPHAGEAL REFLUX DISEASE WITHOUT ESOPHAGITIS: ICD-10-CM

## 2019-10-11 DIAGNOSIS — R73.03 PREDIABETES: ICD-10-CM

## 2019-10-11 DIAGNOSIS — E55.9 HYPOVITAMINOSIS D: ICD-10-CM

## 2019-10-11 DIAGNOSIS — Z00.00 PHYSICAL EXAM: Primary | ICD-10-CM

## 2019-10-11 NOTE — PROGRESS NOTES
Reynold Larson presents today for   Chief Complaint   Patient presents with    Physical     labs done              Depression Screening:  3 most recent PHQ Screens 10/11/2019   Little interest or pleasure in doing things Not at all   Feeling down, depressed, irritable, or hopeless Not at all   Total Score PHQ 2 0       Learning Assessment:  Learning Assessment 10/11/2019   PRIMARY LEARNER Patient   HIGHEST LEVEL OF EDUCATION - PRIMARY LEARNER  4 YEARS OF COLLEGE   BARRIERS PRIMARY LEARNER NONE   CO-LEARNER CAREGIVER No   PRIMARY LANGUAGE ENGLISH   LEARNER PREFERENCE PRIMARY DEMONSTRATION   ANSWERED BY patient   RELATIONSHIP SELF       Abuse Screening:  Abuse Screening Questionnaire 10/11/2019   Do you ever feel afraid of your partner? N   Are you in a relationship with someone who physically or mentally threatens you? N   Is it safe for you to go home? Y       Fall Risk  No flowsheet data found. Coordination of Care:  1. Have you been to the ER, urgent care clinic since your last visit? Hospitalized since your last visit? no    2. Have you seen or consulted any other health care providers outside of the [address] [place] [address] since your last visit? Include any pap smears or colon screening.  no

## 2019-10-23 ENCOUNTER — OFFICE VISIT (OUTPATIENT)
Dept: CARDIOLOGY CLINIC | Age: 59
End: 2019-10-23

## 2019-10-23 DIAGNOSIS — Z95.0 PACEMAKER: Primary | ICD-10-CM

## 2019-10-23 DIAGNOSIS — I49.5 SSS (SICK SINUS SYNDROME) (HCC): ICD-10-CM

## 2019-11-04 NOTE — PROGRESS NOTES
I have personally seen and evaluated the device findings. Interrogation reviewed and I agree with assessment.     Rayshawn Arthur

## 2019-11-07 ENCOUNTER — HOSPITAL ENCOUNTER (OUTPATIENT)
Dept: LAB | Age: 59
Discharge: HOME | End: 2019-11-07
Payer: COMMERCIAL

## 2019-11-07 ENCOUNTER — OFFICE VISIT (OUTPATIENT)
Dept: NEUROLOGY | Age: 59
End: 2019-11-07

## 2019-11-07 VITALS
BODY MASS INDEX: 29.16 KG/M2 | TEMPERATURE: 97.9 F | HEART RATE: 80 BPM | HEIGHT: 65 IN | OXYGEN SATURATION: 98 % | RESPIRATION RATE: 18 BRPM | SYSTOLIC BLOOD PRESSURE: 130 MMHG | DIASTOLIC BLOOD PRESSURE: 82 MMHG | WEIGHT: 175 LBS

## 2019-11-07 DIAGNOSIS — R20.2 NUMBNESS AND TINGLING OF BOTH FEET: ICD-10-CM

## 2019-11-07 DIAGNOSIS — R73.03 PREDIABETES: ICD-10-CM

## 2019-11-07 DIAGNOSIS — G56.03 BILATERAL CARPAL TUNNEL SYNDROME: ICD-10-CM

## 2019-11-07 DIAGNOSIS — R20.2 NUMBNESS AND TINGLING OF BOTH FEET: Primary | ICD-10-CM

## 2019-11-07 DIAGNOSIS — G25.81 RESTLESS LEG SYNDROME: ICD-10-CM

## 2019-11-07 DIAGNOSIS — R20.0 NUMBNESS AND TINGLING OF BOTH FEET: Primary | ICD-10-CM

## 2019-11-07 DIAGNOSIS — R20.0 NUMBNESS AND TINGLING OF BOTH FEET: ICD-10-CM

## 2019-11-07 LAB
FERRITIN SERPL-MCNC: 189 NG/ML (ref 8–388)
IRON SERPL-MCNC: 49 UG/DL (ref 50–175)
VIT B12 SERPL-MCNC: 403 PG/ML (ref 211–911)

## 2019-11-07 PROCEDURE — 83540 ASSAY OF IRON: CPT

## 2019-11-07 PROCEDURE — 82784 ASSAY IGA/IGD/IGG/IGM EACH: CPT

## 2019-11-07 PROCEDURE — 82607 VITAMIN B-12: CPT

## 2019-11-07 PROCEDURE — 82728 ASSAY OF FERRITIN: CPT

## 2019-11-07 PROCEDURE — 36415 COLL VENOUS BLD VENIPUNCTURE: CPT

## 2019-11-07 PROCEDURE — 83921 ORGANIC ACID SINGLE QUANT: CPT

## 2019-11-07 RX ORDER — CHOLECALCIFEROL (VITAMIN D3) 125 MCG
CAPSULE ORAL
COMMUNITY

## 2019-11-07 NOTE — LETTER
11/7/19 Patient: Winsome De Los Santos YOB: 1960 Date of Visit: 11/7/2019 Mandie Devlin MD 
[address] [place] [address] VIA In Basket Dear Mandie Devlin MD, Thank you for referring Ms. Londell Bamberger to [facility] for evaluation. My notes for this consultation are attached. If you have questions, please do not hesitate to call me. I look forward to following your patient along with you.  
 
 
Sincerely, 
 
Lexi Mtz MD

## 2019-11-07 NOTE — PROGRESS NOTES
Chief Complaint   Patient presents with    Numbness     bilateral foot, toes       Ruben Graves presents today for   Chief Complaint   Patient presents with    Numbness     bilateral foot, toes       Is someone accompanying this pt? no    Is the patient using any DME equipment during OV? no    Depression Screening:  3 most recent PHQ Screens 10/11/2019   Little interest or pleasure in doing things Not at all   Feeling down, depressed, irritable, or hopeless Not at all   Total Score PHQ 2 0       Learning Assessment:  Learning Assessment 10/11/2019   PRIMARY LEARNER Patient   HIGHEST LEVEL OF EDUCATION - PRIMARY LEARNER  4 YEARS OF COLLEGE   BARRIERS PRIMARY LEARNER NONE   CO-LEARNER CAREGIVER No   PRIMARY LANGUAGE ENGLISH   LEARNER PREFERENCE PRIMARY DEMONSTRATION   ANSWERED BY patient   RELATIONSHIP SELF       Abuse Screening:  Abuse Screening Questionnaire 10/11/2019   Do you ever feel afraid of your partner? N   Are you in a relationship with someone who physically or mentally threatens you? N   Is it safe for you to go home? Y       Fall Risk  No flowsheet data found. Coordination of Care:  1. Have you been to the ER, urgent care clinic since your last visit? Hospitalized since your last visit? no    2. Have you seen or consulted any other health care providers outside of the [address] [place] [address] since your last visit? Include any pap smears or colon screening. Yes, [place].

## 2019-11-07 NOTE — PROGRESS NOTES
HPI:  63 y/o female referred by Dr. Stephany Noel, a Podiatrist,  for evaluation of toe numbness. She has history of bilateral buniectomies and because the big toe is climbing on the 2nd toe, he wanted to consider the possibility of a neuropathy. For about 2 yrs in between her toes she has a feeling of cotton balls, more noticeable on the big toe and second toe, maybe a little on the others. It is symmetric. Does not notice whether activity, rest, sitting, laying or standing makes it worse. Rare has a drink, he is not diabetes. Denies burning, tingling or pain. It does not radiate proximally. Denies back pain shooting into the legs, although has h/o sciatica which is under control. Reportedly has disc disease. A few years ago she was getting pain down the LLE, all the way down from the hip to the toes, but this has been under control for a few years after PT. She is on pramipexole for about 10 yrs with good relief of RLS, currently at 2mg, increased 2 yrs ago, the lower doses were not working, not making it through the day. She describes she had to move her legs, her  mentioned she was kicking all night long, and during the day when sitting she had to stand up in meetings as she could not sit still, had to move. These symptoms were worse at night. She denies any compulsive gambling. She does report she has history of carpal tunnel with numbness in the hands leading to surgery about 10 years ago. She did well for some time, but over the last year or so she has been getting more numbness and tingling in both hands with activity not necessarily worse at night. A1C has been at the prediabetic range 5 times since 2018, as high as 6.3. TSH April at 0.85. I have no vitamin B12 levels, Iron, ferritin, EP results.    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   Social Needs    Financial resource strain: Not on file    Food insecurity:     Worry: Not on file     Inability: Not on file    Transportation needs:     Medical: Not on file     Non-medical: Not on file   Tobacco Use    Smoking status: Former Smoker     Packs/day: 1.00     Years: 20.00     Pack years: 20.00     Last attempt to quit: 1993     Years since quittin.8    Smokeless tobacco: Never Used    Tobacco comment:    Substance and Sexual Activity    Alcohol use: Yes     Alcohol/week: 0.0 standard drinks     Comment: rarely    Drug use: No    Sexual activity: Not on file   Lifestyle    Physical activity:     Days per week: Not on file     Minutes per session: Not on file    Stress: Not on file   Relationships    Social connections:     Talks on phone: Not on file     Gets together: Not on file     Attends U[employer] service: Not on file     Active member of club or organization: Not on file     Attends meetings of clubs or organizations: Not on file     Relationship status: Not on file    Intimate partner violence:     Fear of current or ex partner: Not on file     Emotionally abused: Not on file     Physically abused: Not on file     Forced sexual activity: Not on file   Other Topics Concern    Not on file   Social History Narrative    Not on file       Family History   Problem Relation Age of Onset    Hypertension Mother     Diabetes Mother     Stroke Mother     Arthritis-osteo Mother     Cancer Mother         breast    Elevated Lipids Mother     Breast Cancer Mother     Diabetes Father     Arthritis-osteo Father     Heart Disease Father     Elevated Lipids Father     Arthritis-osteo Sister     Migraines Sister     Alcohol abuse Brother     Arthritis-osteo Brother        Current Outpatient Medications   Medication Sig Dispense Refill    cholecalciferol, vitamin D3, (VITAMIN D3) 2,000 unit tab Take  by mouth.  pramipexole (MIRAPEX) 1 mg tablet TAKE 1 TABLET BY MOUTH THREE TIMES DAILY (Patient taking differently: Indications: 2 tabs one time daily) 270 Tab 3    EPINEPHrine (EPIPEN 2-PRITI) 0.3 mg/0.3 mL injection 0.3 mL by IntraMUSCular route once as needed for up to 1 dose. 2 Syringe 5    calcium-cholecalciferol, D3, (CALTRATE 600+D) tablet Take 1 Tab by mouth daily.  fish oil-dha-epa 1,200-144-216 mg cap Take 1 Cap by mouth daily.  VITAMIN D2 50,000 unit capsule          Past Medical History:   Diagnosis Date    Agatston CAC score, <100 02/29/2016    ca score ZERO    Arrhythmia     PVC 10% burden; s/p partial ablation Dr Savannah Lizarraga 2/16    Asthma     h/o exercise induced, denies 3/2/18    Cardiac echocardiogram 01/19/2016    EF 55-60%. No RWMA. Indeterminate diastolic fx. RVSP 26 mmHg. Mild MR.  Cardiac Holter monitor study 12/21/2015    Sinus rhythm, avg HR 82 bpm (range  bpm). Numerous PVCs. A few short runs of VT.  Cardiovascular LE venous duplex 07/08/2016    No DVT bilaterally.     Cholelithiasis     on US 2008, 4/17    Chronic back pain     Dr. Nancie Maurice    Chronic venous insufficiency 2016    Dr Sabrina Moody polyp 03/05/2018    Dr Tierney Mahmood; serrated polyp and divertics    De Quervain's tenosynovitis     Dr Abdiel Kumar Decreased GFR     saw Dr Miladys العراقي    Diverticulitis 12/2017    CT proven    Dupuytren's contracture of both hands     Dr Abdiel Kumar FHx: heart disease     GERD (gastroesophageal reflux disease) 2/99    on UGI    History of palpitations 2005    Hyperlipidemia     calculated 10 year risk score was 1.8% (12/15); 2.2% (10/16); 2.3% (9/18)    Hypovitaminosis D     Myofacial pain     neck and thoracic    OA (osteoarthritis)     lumbar DDD    Obesity     peak weight 200 lbs, bmi 33.3 from 5/13; qsymia ineffective, intol contrave, declined med supervised wt loss; 24h U petar nl; IF 3/18 start weight 204 lbs    Osteopenia 2007    t score -1 spine, -0.2 hip  Juan Francisco    Pericardial cyst 12/13    calcified on CT    Prediabetes 11/12    Restless legs syndrome     Tachycardia     neg eval Dr. Quinones Cancer 2004    Tension headache     Thyroid nodule 8/12    3mm; 5/13, 12/13, 11/14, 1/16 no change    Trigger finger of both hands     Dr Rolo Hedrick       Past Surgical History:   Procedure Laterality Date    CARDIAC SURG PROCEDURE UNLIST  2004    Holter negative    CARDIAC SURG PROCEDURE UNLIST  2004    echo nl lv, ef 65%    CARDIAC SURG PROCEDURE UNLIST  02/2016    Dr Roxann Hansen; heart ablation    COLONOSCOPY N/A 3/5/2018    HBV 2011 negative; Dr Foster Her 3/5/18 serrated polyp and divertics    HAND/FINGER SURGERY UNLISTED  12/2017    Dr Lavinia Munoz; left thumb cmc arthroplasty    HX BUNIONECTOMY  2005    bilateral    HX CARPAL TUNNEL RELEASE  12/08    HX CHOLECYSTECTOMY  05/05/2017    robotic single site cholecystectomy Dr Danna Beltran  2007    right lumbar     HX LIPOMA RESECTION      x2 right low back    HX ORTHOPAEDIC      trigger finger release Dr Rajni Hernandes  2018    Dr Lavinia Munoz finger surgery    HX PACEMAKER  02/28/2017    [employer]    DC EXC TUMOR SOFT TISS BACK/FLANK SUBFASCIAL <5CM N/A 11/23/2018    Dr. Arely Flowers SOFT TISSUE BACK/FLANK SUBQ 3+CM N/A 11/23/2018    Dr. Daria Alfredo  3/12    venous doppler negative       Allergies   Allergen Reactions    Adhesive Tape-Silicones Contact Dermatitis    Bee Sting [Sting, Bee] Anaphylaxis    Benzoyl Peroxide Rash    Betadine [Povidone-Iodine] Rash    Chlorhexidine Towelette Rash    Codeine Nausea and Vomiting    Contrave [Naltrexone-Bupropion] Other (comments)     Vision changes       Patient Active Problem List   Diagnosis Code    Hyperlipidemia E78.5    Prediabetes R73.03    Gastroesophageal reflux disease without esophagitis K21.9    DDD (degenerative disc disease), lumbar M51.36    S/P ablation operation for arrhythmia Z98.890, Z86.79    Obesity (BMI 30-39. 9) E66.9    Varicose veins of both lower extremities I83.93    Pacemaker Z95.0    Seasonal allergic rhinitis J30.1    Hypovitaminosis D E55.9    Overweight (BMI 25.0-29. 9) E66.3         Review of Systems:   Constitutional: no fever or chills  Skin denies rash or itching  HEENT:  Denies tinnitus, hearing loss, or visual changes  Respiratory: denies shortness of breath  Cardiovascular: denies chest pain, dyspnea on exertion  Gastrointestinal: does not report nausea or vomiting  Genitourinary: does not report dysuria or incontinence  Musculoskeletal: does not report joint pain or swelling  Endocrine: denies weight change  Hematology: denies easy bruising or bleeding   Neurological: as above in HPI      PHYSICAL EXAMINATION:         Vital signs:    Visit Vitals  /82 (BP 1 Location: Left arm, BP Patient Position: Sitting)   Pulse 80   Temp 97.9 °F (36.6 °C)   Resp 18   Ht 5' 5\" (1.651 m)   Wt 79.4 kg (175 lb)   SpO2 98%   BMI 29.12 kg/m²         GENERAL:                  Well developed, well nourished, in no apparent distress. HEART:                       RR, no murmurs  EXTREMITIES:           No edema is identified. Pulses are +2. HEAD:                         Normocephalic, atraumatic. NEUROLOGIC EXAMINATION       MENTAL STATUS:     Awake, alert, and oriented x 4. Attention and STM are grossly normal. There is no aphasia. Fund of knowledge is adequate. Mood and affect are appropriate  CRANIAL NERVES:   Visual fields are full to confrontation. Pupils are reactive to light and accommodation. Fundi are normal.   Extraocular movements are intact and there is no nystagmus. Facial sensation is normal  Face is symmetrical.   Hearing is present. SCM/TPZ 5/5  Tongue protrudes midline, palate elevates symmetrically. MOTOR:          5/5 strength throughout, normal tone.      CEREBELLAR:           No tremors or dysmetria SENSORY:     Normal distal pinprick, proprioception, and vibration. Romberg is negative, positive bilateral Tinel's and Phalen's signs both wrists. 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 I think if she does have a neuropathy is likely to be related to her prediabetic state and beyond the resolution of electrodiagnostic testing. Additionally, she has chronic restless leg syndrome. For now she is responding to pramipexole 2 mg nightly, but she has had some increase on the doses over time and there is risk for future augmentation. Iron and ferritin deficiencies need to be considered which potentially may help reduce the amount of dopamine stimulation. Finally, she has signs and symptoms that suggest that her bilateral carpal tunnel may be returning even after she had surgery 10 years ago. Plan: 1- iron, ferritin, vitamin B12, methylmalonic acid, serum protein electrophoresis and immunoelectrophoresis. 2-EMG and nerve conduction studies of both upper extremities. 3-counseled her about managing her prediabetic state. 4-Will consider suggestions to adjust her dopaminergic treatment after reviewing labs on her follow-up. PLEASE NOTE:   Portions of this document may have been produced using voice recognition software. Unrecognized errors in transcription may be present. This note will not be viewable in [address]

## 2019-11-07 NOTE — PATIENT INSTRUCTIONS
Thank you for choosing [facility] and [facility] for your     care. You may receive a survey about your visit. We appreciate you taking time     to complete this survey as we use your feedback to improve our services. We     realize we are not perfect, but we strive to provide excellent care.

## 2019-11-11 LAB
Lab: NORMAL
METHYLMALONATE SERPL-SCNC: 219 NMOL/L (ref 0–378)

## 2019-11-12 LAB
ALBUMIN SERPL ELPH-MCNC: 4.4 G/DL (ref 2.9–4.4)
ALBUMIN/GLOB SERPL: 1.5 {RATIO} (ref 0.7–1.7)
ALPHA1 GLOB SERPL ELPH-MCNC: 0.2 G/DL (ref 0–0.4)
ALPHA2 GLOB SERPL ELPH-MCNC: 0.7 G/DL (ref 0.4–1)
B-GLOBULIN SERPL ELPH-MCNC: 1.2 G/DL (ref 0.7–1.3)
GAMMA GLOB SERPL ELPH-MCNC: 1.1 G/DL (ref 0.4–1.8)
GLOBULIN SER-MCNC: 3.1 G/DL (ref 2.2–3.9)
IGA SERPL-MCNC: 224 MG/DL (ref 87–352)
IGG SERPL-MCNC: 1085 MG/DL (ref 700–1600)
IGM SERPL-MCNC: 117 MG/DL (ref 26–217)
INTERPRETATION SERPL IEP-IMP: NORMAL
M PROTEIN SERPL ELPH-MCNC: NORMAL G/DL
PROT SERPL-MCNC: 7.5 G/DL (ref 6–8.5)

## 2019-12-11 ENCOUNTER — APPOINTMENT (OUTPATIENT)
Dept: NUTRITION | Age: 59
End: 2019-12-11

## 2020-01-14 ENCOUNTER — OFFICE VISIT (OUTPATIENT)
Dept: NEUROLOGY | Age: 60
End: 2020-01-14

## 2020-01-14 VITALS
HEART RATE: 89 BPM | BODY MASS INDEX: 33.39 KG/M2 | WEIGHT: 200.4 LBS | HEIGHT: 65 IN | DIASTOLIC BLOOD PRESSURE: 70 MMHG | TEMPERATURE: 98.8 F | OXYGEN SATURATION: 97 % | RESPIRATION RATE: 16 BRPM | SYSTOLIC BLOOD PRESSURE: 125 MMHG

## 2020-01-14 DIAGNOSIS — G56.03 BILATERAL CARPAL TUNNEL SYNDROME: Primary | ICD-10-CM

## 2020-01-14 DIAGNOSIS — R20.0 NUMBNESS AND TINGLING OF BOTH FEET: ICD-10-CM

## 2020-01-14 DIAGNOSIS — G25.81 RESTLESS LEG SYNDROME: ICD-10-CM

## 2020-01-14 DIAGNOSIS — R20.2 NUMBNESS AND TINGLING OF BOTH FEET: ICD-10-CM

## 2020-01-14 DIAGNOSIS — R73.03 PREDIABETES: ICD-10-CM

## 2020-01-14 NOTE — PROGRESS NOTES
[facility] [place] [address]  [facility] [phone]    Neurophysiology Report    Patient: Chapin Blank     ID: [id number] Physician: Fritz Calderón MD   Gender: Female Ref Phys: Fritz Calderón MD   Handedness: Paola Currie     Study Date: January 14, 2020         Patient History:  Bilateral CTS    On brief exam    Nerve Conduction Studies  Anti Sensory Summary Table     Stim Site NR Peak (ms) Norm Peak (ms) O-P Amp (µV) Norm O-P Amp Dist (cm) Omi (m/s)   Left Median 2nd Digit Anti Sensory (2nd Digit)   Wrist    3.3 <3.5 18.7 >20 13.0 50.0   Right Median 2nd Digit Anti Sensory (2nd Digit)   Wrist    3.3 <3.5 17.9 >20 13.0 50.0   Left Ulnar Anti Sensory (5th Digit )   Wrist    2.9 <3.1 23.3 >17.0 11.0 57.9   Right Ulnar Anti Sensory (5th Digit )   Wrist    2.9 <3.1 18.2 >17.0 11.0 55.0     Motor Summary Table     Stim Site NR Onset (ms) Norm Onset (ms) O-P Amp (mV) Norm O-P Amp Dist (cm) Omi (m/s) Norm Omi (m/s)   Left Median Motor (Abd Poll Brev)   Wrist    3.4 <4.4 5.3 >4.0 22.0 56.4 >49   Elbow    7.3  1.9  0.0  >48   Axilla    8.0  2.2       Right Median Motor (Abd Poll Brev)   Wrist    3.4 <4.4 4.6 >4.0 20.0 46.5 >49   Elbow    7.7  4.4       Left Ulnar Motor (Abd Dig Minimi )   Wrist    2.7 <3.3 6.1 >6.0 20.0 57.1 >49   B Elbow    6.2  5.8  13.0 76.5 >50   A Elbow    7.9  5.6       Right Ulnar Motor (Abd Dig Minimi )   Wrist    2.6 <3.3 7.6 >6.0 20.0 62.5 >49   B Elbow    5.8  8.1  12.0 57.1 >50   A Elbow    7.9  8.0           NCS/EMG FINDINGS:     Evaluation of the Left median motor, the Left ulnar motor, the Right ulnar motor, the Left ulnar sensory, and the Right ulnar sensory nerves were unremarkable.  The Right median motor nerve showed decreased conduction velocity (Elbow-Wrist, 46.5 m/s).  The Left Median 2nd Digit sensory and the Right Median 2nd Digit sensory nerves showed reduced amplitude (L18.7, R17.9 µV).       INTERPRETATION: Low normal bilateral median motor amplitudes suggest an old median nerve injury. There is no ongoing denervation in the median nerves at this time.      ___________________________  Kathy Campos MD  Board Certified in Neurology          Waveforms:                    [address] AT [facility]  OFFICE PROCEDURE PROGRESS NOTE        Chart reviewed for the following:   Lucia Vallecillo MD, have reviewed the History, Physical and updated the Allergic reactions for [employer] [employer] performed immediately prior to start of procedure:   Lucia Vallecillo MD, have performed the following reviews on [facility] prior to the start of the procedure:            * Patient was identified by name and date of birth   * Agreement on procedure being performed was verified  * Risks and Benefits explained to the patient  * Procedure site verified and marked as necessary  * Patient was positioned for comfort  * Consent was signed and verified     Time: 2:49 PM     Date of procedure: 1/14/2020    Procedure performed by:  PROCEDURE ROOM    Provider assisted by: Neda Lozano      Patient assisted by: self    How tolerated by patient: tolerated the procedure well with no complications    Post Procedural Pain Scale: 0 - No Hurt    Comments: none

## 2020-01-14 NOTE — PROGRESS NOTES
Rafiq Weller presents today for   Chief Complaint   Patient presents with    Procedure     EMG BUE       Is someone accompanying this pt? No    Is the patient using any DME equipment during OV? No    Are there any discrepancies in patient's vital signs?   No

## 2020-01-14 NOTE — PROGRESS NOTES
2020 2:06 PM    SSN: [id number]    Subjective:   60 y/o female last here in November for eval of distal BLE paresthesias with normal peripheral neuro exam, likely related to prediabetic state, RLS on Mirapex with concerns for augmentation, and question of bilat CTS. Comes for f/u of labs, NCS arms. Labs on  showed B12 403, iron was 49 (over 50 normal), ferritin was 189, MMA at 219, nl SPEP and MANOJ. Since last visit remains on mirapex, no changes in her condition. NCS today showed low normal bilateral median motor amps, otherwise normal NCS. EMG deferred due to expectation of low yield. 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   Social Needs    Financial resource strain: Not on file    Food insecurity:     Worry: Not on file     Inability: Not on file    Transportation needs:     Medical: Not on file     Non-medical: Not on file   Tobacco Use    Smoking status: Former Smoker     Packs/day: 1.00     Years: 20.00     Pack years: 20.00     Last attempt to quit: 1993     Years since quittin.0    Smokeless tobacco: Never Used    Tobacco comment:    Substance and Sexual Activity    Alcohol use:  Yes     Alcohol/week: 0.0 standard drinks     Comment: rarely    Drug use: No    Sexual activity: Not on file   Lifestyle    Physical activity:     Days per week: Not on file     Minutes per session: Not on file    Stress: Not on file   Relationships    Social connections:     Talks on phone: Not on file     Gets together: Not on file     Attends Restoration service: Not on file     Active member of club or organization: Not on file     Attends meetings of clubs or organizations: Not on file     Relationship status: Not on file    Intimate partner violence:     Fear of current or ex partner: Not on file     Emotionally abused: Not on file     Physically abused: Not on file     Forced sexual activity: Not on file   Other Topics Concern    Not on file   Social History Narrative    Not on file       Family History   Problem Relation Age of Onset    Hypertension Mother     Diabetes Mother     Stroke Mother     Arthritis-osteo Mother     Cancer Mother         breast    Elevated Lipids Mother     Breast Cancer Mother     Diabetes Father     Arthritis-osteo Father     Heart Disease Father     Elevated Lipids Father     Arthritis-osteo Sister     Migraines Sister     Alcohol abuse Brother     Arthritis-osteo Brother        Current Outpatient Medications   Medication Sig Dispense Refill    pramipexole (MIRAPEX) 1 mg tablet TAKE 1 TABLET BY MOUTH THREE TIMES DAILY (Patient taking differently: Indications: 2 tabs one time daily) 270 Tab 3    EPINEPHrine (EPIPEN 2-PRITI) 0.3 mg/0.3 mL injection 0.3 mL by IntraMUSCular route once as needed for up to 1 dose. 2 Syringe 5    calcium-cholecalciferol, D3, (CALTRATE 600+D) tablet Take 1 Tab by mouth daily.  fish oil-dha-epa 1,200-144-216 mg cap Take 1 Cap by mouth daily.  cholecalciferol, vitamin D3, (VITAMIN D3) 2,000 unit tab Take  by mouth.  VITAMIN D2 50,000 unit capsule          Past Medical History:   Diagnosis Date    Agatston CAC score, <100 02/29/2016    ca score ZERO    Arrhythmia     PVC 10% burden; s/p partial ablation Dr Ivette Amaral 2/16    Asthma     h/o exercise induced, denies 3/2/18    Cardiac echocardiogram 01/19/2016    EF 55-60%. No RWMA. Indeterminate diastolic fx. RVSP 26 mmHg. Mild MR.  Cardiac Holter monitor study 12/21/2015    Sinus rhythm, avg HR 82 bpm (range  bpm). Numerous PVCs. A few short runs of VT.  Cardiovascular LE venous duplex 07/08/2016    No DVT bilaterally.     Cholelithiasis     on US 2008, 4/17    Chronic back pain     Dr. Seth Lemus  Chronic venous insufficiency 2016    Dr Milli Nava polyp 03/05/2018    Dr Mary Whitlock; serrated polyp and divertics    De Quervain's tenosynovitis     Dr Colonel Serrano Decreased GFR     saw Dr Anabel Garcia    Diverticulitis 12/2017    CT proven    Dupuytren's contracture of both hands     Dr Colonel Serrano FHx: heart disease     GERD (gastroesophageal reflux disease) 2/99    on UGI    History of palpitations 2005    Hyperlipidemia     calculated 10 year risk score was 1.8% (12/15); 2.2% (10/16); 2.3% (9/18)    Hypovitaminosis D     Myofacial pain     neck and thoracic    OA (osteoarthritis)     lumbar DDD    Obesity     peak weight 200 lbs, bmi 33.3 from 5/13; qsymia ineffective, intol contrave, declined med supervised wt loss; 24h U petar nl; IF 3/18 start weight 204 lbs    Osteopenia 2007    t score -1 spine, -0.2 hip Dr. Onel Michelle Pericardial cyst 12/13    calcified on CT    Prediabetes 11/12    Restless legs syndrome     Tachycardia     neg eval Dr. Brit Rosario 2004    Tension headache     Thyroid nodule 8/12    3mm; 5/13, 12/13, 11/14, 1/16 no change    Trigger finger of both hands     Dr Patricia Duff       Past Surgical History:   Procedure Laterality Date    CARDIAC SURG PROCEDURE UNLIST  2004    Holter negative    CARDIAC SURG PROCEDURE UNLIST  2004    echo nl lv, ef 65%    CARDIAC SURG PROCEDURE UNLIST  02/2016    Dr Deep Cosby; heart ablation    COLONOSCOPY N/A 3/5/2018    HBV 2011 negative; Dr Mary Whitlock 3/5/18 serrated polyp and divertics    HAND/FINGER SURGERY UNLISTED  12/2017    Dr Cee Mcpherson; left thumb cmc arthroplasty    HX BUNIONECTOMY  2005    bilateral    HX CARPAL TUNNEL RELEASE  12/08    HX CHOLECYSTECTOMY  05/05/2017    robotic single site cholecystectomy Dr Daryl Simeon  2007    right lumbar     HX LIPOMA RESECTION      x2 right low back    HX ORTHOPAEDIC      trigger finger release Dr Maria Esther Escoto  2018    Dr Cee Mcpherson finger surgery    HX PACEMAKER 02/28/2017    [employer]    SD EXC TUMOR SOFT TISS BACK/FLANK SUBFASCIAL <5CM N/A 11/23/2018    Dr. Pollard Long TUMOR SOFT TISSUE BACK/FLANK SUBQ 3+CM N/A 11/23/2018    Dr. Patricia Garduno  3/12    venous doppler negative       Allergies   Allergen Reactions    Adhesive Tape-Silicones Contact Dermatitis    Bee Sting [Sting, Bee] Anaphylaxis    Benzoyl Peroxide Rash    Betadine [Povidone-Iodine] Rash    Chlorhexidine Towelette Rash    Codeine Nausea and Vomiting    Contrave [Naltrexone-Bupropion] Other (comments)     Vision changes       Vital signs:    Visit Vitals  /70 (BP 1 Location: Left arm, BP Patient Position: Sitting)   Pulse 89   Temp 98.8 °F (37.1 °C) (Oral)   Resp 16   Ht 5' 5\" (1.651 m)   Wt 90.9 kg (200 lb 6.4 oz)   SpO2 97%   BMI 33.35 kg/m²       Review of Systems:   GENERAL: Denies fever or fatigue  CARDIAC: No CP or SOB  PULMONARY: No cough of SOB  MUSCULOSKELETAL: No new joint pain  NEURO: SEE HPI      EXAM: Alert, in NAD. Heart is regular. Oriented x3, EOM's are full, PERRL, no facial asymmetries. Strength and tone are normal. DTR's +2, gait symmetric       Assessment/Plan: NCS suggest she had significant median n. Involvement presurgically, but I see no evidence of ongoing denervation. Recommended continuation of use of wrist braces particularly at night. Has RLS, at this time well treated with mirapex 2mg. She has no ferritin def despite slightly low iron. Would consider iron replacement and recommend avoidance of further dopa agonist increases d/t augmentation if symptoms are not as well controlled as they are now. Clarified that in the absence of ferritin deficiency, transferritin to ferritin/iron levels are not clinically useful in the management of RLS given current lit. Again discussed the fact her BLE paresthesias is from a prediabetic state.  Has no other serological anomalies to explain it and exam is normal, no anomalies on exam to conclude she has a polyneuropathy. She will f/u with PCP and return to neuro prn. PLEASE NOTE:   Portions of this document may have been produced using voice recognition software. Unrecognized errors in transcription may be present. This note will not be viewable in [address]

## 2020-01-24 ENCOUNTER — HOSPITAL ENCOUNTER (OUTPATIENT)
Dept: LAB | Age: 60
Discharge: HOME | End: 2020-01-24
Payer: COMMERCIAL

## 2020-01-24 ENCOUNTER — LAB ONLY (OUTPATIENT)
Dept: INTERNAL MEDICINE CLINIC | Age: 60
End: 2020-01-24

## 2020-01-24 DIAGNOSIS — E78.5 HYPERLIPIDEMIA, UNSPECIFIED HYPERLIPIDEMIA TYPE: ICD-10-CM

## 2020-01-24 DIAGNOSIS — E78.00 PURE HYPERCHOLESTEROLEMIA: ICD-10-CM

## 2020-01-24 DIAGNOSIS — E78.00 PURE HYPERCHOLESTEROLEMIA: Primary | ICD-10-CM

## 2020-01-24 LAB
CHOLEST SERPL-MCNC: 303 MG/DL
HDLC SERPL-MCNC: 77 MG/DL (ref 40–60)
HDLC SERPL: 3.9 {RATIO} (ref 0–5)
LDLC SERPL CALC-MCNC: 151.4 MG/DL (ref 0–100)
LIPID PROFILE,FLP: ABNORMAL
T4 FREE SERPL-MCNC: 1 NG/DL (ref 0.7–1.5)
TRIGL SERPL-MCNC: 373 MG/DL (ref ?–150)
TSH SERPL DL<=0.05 MIU/L-ACNC: 1.1 UIU/ML (ref 0.36–3.74)
VLDLC SERPL CALC-MCNC: 74.6 MG/DL

## 2020-01-24 PROCEDURE — 84443 ASSAY THYROID STIM HORMONE: CPT

## 2020-01-24 PROCEDURE — 36415 COLL VENOUS BLD VENIPUNCTURE: CPT

## 2020-01-24 PROCEDURE — 80061 LIPID PANEL: CPT

## 2020-01-24 PROCEDURE — 84439 ASSAY OF FREE THYROXINE: CPT

## 2020-01-29 ENCOUNTER — OFFICE VISIT (OUTPATIENT)
Dept: CARDIOLOGY CLINIC | Age: 60
End: 2020-01-29

## 2020-01-29 DIAGNOSIS — Z95.0 PACEMAKER: Primary | ICD-10-CM

## 2020-01-29 DIAGNOSIS — I49.5 SSS (SICK SINUS SYNDROME) (HCC): ICD-10-CM

## 2020-02-26 NOTE — PROGRESS NOTES
I have personally seen and evaluated the device findings. Interrogation reviewed and I agree with assessment.     Sharyn Miranda

## 2020-03-30 RX ORDER — EPINEPHRINE 0.3 MG/.3ML
INJECTION SUBCUTANEOUS
Qty: 2 SYRINGE | Refills: 5 | Status: SHIPPED | OUTPATIENT
Start: 2020-03-30

## 2020-04-02 RX ORDER — PRAMIPEXOLE DIHYDROCHLORIDE 1 MG/1
1 TABLET ORAL 3 TIMES DAILY
Qty: 270 TAB | Refills: 3 | Status: SHIPPED | OUTPATIENT
Start: 2020-04-02

## 2020-04-15 ENCOUNTER — PATIENT MESSAGE (OUTPATIENT)
Dept: CARDIOLOGY CLINIC | Age: 60
End: 2020-04-15

## 2020-04-15 ENCOUNTER — CLINICAL SUPPORT (OUTPATIENT)
Dept: CARDIOLOGY CLINIC | Age: 60
End: 2020-04-15

## 2020-04-15 DIAGNOSIS — I49.5 SSS (SICK SINUS SYNDROME) (HCC): Primary | ICD-10-CM

## 2020-04-15 DIAGNOSIS — Z95.0 PACEMAKER: ICD-10-CM

## 2020-04-15 NOTE — PROGRESS NOTES
I have personally seen and evaluated the device findings. Interrogation reviewed and I agree with assessment.     Ciarra Meeks

## 2020-04-16 ENCOUNTER — VIRTUAL VISIT (OUTPATIENT)
Dept: CARDIOLOGY CLINIC | Age: 60
End: 2020-04-16

## 2020-04-16 VITALS
HEART RATE: 78 BPM | WEIGHT: 192 LBS | HEIGHT: 65 IN | SYSTOLIC BLOOD PRESSURE: 123 MMHG | DIASTOLIC BLOOD PRESSURE: 77 MMHG | BODY MASS INDEX: 31.99 KG/M2

## 2020-04-16 DIAGNOSIS — Z95.0 PACEMAKER: ICD-10-CM

## 2020-04-16 DIAGNOSIS — E78.5 HYPERLIPIDEMIA, UNSPECIFIED HYPERLIPIDEMIA TYPE: ICD-10-CM

## 2020-04-16 DIAGNOSIS — I49.5 SSS (SICK SINUS SYNDROME) (HCC): Primary | ICD-10-CM

## 2020-04-16 DIAGNOSIS — Z86.79 S/P ABLATION OPERATION FOR ARRHYTHMIA: ICD-10-CM

## 2020-04-16 DIAGNOSIS — Z98.890 S/P ABLATION OPERATION FOR ARRHYTHMIA: ICD-10-CM

## 2020-04-16 DIAGNOSIS — I49.3 PVC'S (PREMATURE VENTRICULAR CONTRACTIONS): ICD-10-CM

## 2020-04-16 NOTE — PROGRESS NOTES
Consent: Kishan Barnett, who was seen by synchronous (real-time) audio-video technology, and/or her healthcare decision maker, is aware that this patient-initiated, Telehealth encounter on 4/16/2020 is a billable service, with coverage as determined by her insurance carrier. She is aware that she may receive a bill and has provided verbal consent to proceed: Yes. Discussion about cardiac condition and relevant findings. Assessment & Plan:     Diagnoses and all orders for this visit:    1. SSS (sick sinus syndrome) ([address]    2. Pacemaker    3. S/P ablation operation for arrhythmia    4. Hyperlipidemia, unspecified hyperlipidemia type    5. PVC's (premature ventricular contractions)      Patient moving in 3-4 months to [place], [place], for 's job, will need to establish care there. No changes. Normal pacer function. All questions answered and discussion of diagnosis and planned treatment. I spent at least 25 minutes with this established patient, and >50% of the time was spent counseling and/or coordinating care regarding pacemaker  712  Subjective:     HPI:  Kishan Barnett is a 61 y.o. female who was seen for Follow-up (1 year follow up)    Doing well, exercising without limitation and preparing to move. Prior to Admission medications    Medication Sig Start Date End Date Taking? Authorizing Provider   pramipexole (MIRAPEX) 1 mg tablet Take 1 Tab by mouth three (3) times daily. Patient taking differently: Take 2 mg by mouth daily. Take 1 tab by mouth at night 4/2/20  Yes Yael Torres MD   EPINEPHrine (EPIPEN) 0.3 mg/0.3 mL injection INJECT 1 AUTO-INJECTOR INTRAMUSCULARLY AS NEEDED FOR UP TO 1 DOSE AS DIRECTED 3/30/20  Yes Yael Torres MD   VITAMIN D2 50,000 unit capsule Take 1,000 Units by mouth daily. 12/6/18  Yes Provider, Historical   calcium-cholecalciferol, D3, (CALTRATE 600+D) tablet Take 1 Tab by mouth daily.    Yes Provider, Historical   fish oil-dha-epa 1,200-144-216 mg cap Take 1 Cap by mouth daily. Yes Provider, Historical   cholecalciferol, vitamin D3, (VITAMIN D3) 2,000 unit tab Take  by mouth.     Provider, Historical     Allergies   Allergen Reactions    Adhesive Tape-Silicones Contact Dermatitis    Bee Sting [Sting, Bee] Anaphylaxis    Benzoyl Peroxide Rash    Betadine [Povidone-Iodine] Rash    Chlorhexidine Towelette Rash    Codeine Nausea and Vomiting    Contrave [Naltrexone-Bupropion] Other (comments)     Vision changes       ROS    Objective:     Vital Signs: (As obtained by patient/caregiver at home)  Visit Vitals  /77 (BP 1 Location: Left arm, BP Patient Position: Sitting)   Pulse 78   Ht 5' 5\" (1.651 m)   Wt 87.1 kg (192 lb)   BMI 31.95 kg/m²        [INSTRUCTIONS:  \"[x]\" Indicates a positive item  \"[]\" Indicates a negative item  -- DELETE ALL ITEMS NOT EXAMINED]    Constitutional: [x] Appears well-developed and well-nourished [x] No apparent distress      [] Abnormal -     Mental status: [x] Alert and awake  [x] Oriented to person/place/time [x] Able to follow commands    [] Abnormal -     Eyes:   EOM    [x]  Normal    [] Abnormal -   Sclera  [x]  Normal    [] Abnormal -          Discharge [x]  None visible   [] Abnormal -     HENT: [x] Normocephalic, atraumatic  [] Abnormal -   [x] Mouth/Throat: Mucous membranes are moist    External Ears [x] Normal  [] Abnormal -    Neck: [x] No visualized mass [] Abnormal -     Pulmonary/Chest: [x] Respiratory effort normal   [x] No visualized signs of difficulty breathing or respiratory distress        [] Abnormal -      Musculoskeletal:   [x] Normal gait with no signs of ataxia         [x] Normal range of motion of neck        [] Abnormal -     Neurological:        [x] No Facial Asymmetry (Cranial nerve 7 motor function) (limited exam due to video visit)          [x] No gaze palsy        [] Abnormal -          Skin:        [x] No significant exanthematous lesions or discoloration noted on facial skin         [] Abnormal -            Psychiatric:       [x] Normal Affect [] Abnormal -        [x] No Hallucinations    Other pertinent observable physical exam findings:-    We discussed the expected course, resolution and complications of the diagnosis(es) in detail. Medication risks, benefits, costs, interactions, and alternatives were discussed as indicated. I advised her to contact the office if her condition worsens, changes or fails to improve as anticipated. She expressed understanding with the diagnosis(es) and plan. Balwinder Rajan is a 61 y.o. female being evaluated by a video visit encounter for concerns as above. A caregiver was present when appropriate. Due to this being a TeleHealth encounter (During BDVOW-35 public health emergency), evaluation of the following organ systems was limited: Vitals/Constitutional/EENT/Resp/CV/GI//MS/Neuro/Skin/Heme-Lymph-Imm. Pursuant to the emergency declaration under the [facility], [facility] waiver authority and the [employer] and [employer] Act, this Virtual  Visit was conducted, with patient's (and/or legal guardian's) consent, to reduce the patient's risk of exposure to COVID-19 and provide necessary medical care. Services were provided through a video synchronous discussion virtually to substitute for in-person clinic visit. Patient and provider were located at their individual homes.         Brenden Grande MD

## 2020-04-16 NOTE — PROGRESS NOTES
Luis Eduardo Marcano presents today for   Chief Complaint   Patient presents with    Follow-up     1 year follow up       Luis Eduardo Marcano preferred language for health care discussion is english/other. Is someone accompanying this pt? Virtual Visit    Is the patient using any DME equipment during OV? Virtual Visit    Depression Screening:  3 most recent PHQ Screens 4/16/2020   Little interest or pleasure in doing things Not at all   Feeling down, depressed, irritable, or hopeless Not at all   Total Score PHQ 2 0       Learning Assessment:  Learning Assessment 10/11/2019   PRIMARY LEARNER Patient   HIGHEST LEVEL OF EDUCATION - PRIMARY LEARNER  4 YEARS OF COLLEGE   BARRIERS PRIMARY LEARNER NONE   CO-LEARNER CAREGIVER No   PRIMARY LANGUAGE ENGLISH   LEARNER PREFERENCE PRIMARY DEMONSTRATION   ANSWERED BY patient   RELATIONSHIP SELF       Abuse Screening:  Abuse Screening Questionnaire 4/16/2020   Do you ever feel afraid of your partner? N   Are you in a relationship with someone who physically or mentally threatens you? N   Is it safe for you to go home? Y       Fall Risk  Fall Risk Assessment, last 12 mths 4/16/2020   Able to walk? Yes   Fall in past 12 months? No       Pt currently taking Anticoagulant therapy? No    Coordination of Care:  1. Have you been to the ER, urgent care clinic since your last visit? Hospitalized since your last visit? no    2. Have you seen or consulted any other health care providers outside of the [address] [place] [address] since your last visit? Include any pap smears or colon screening.  no

## 2020-09-30 ENCOUNTER — TELEPHONE (OUTPATIENT)
Dept: INTERNAL MEDICINE CLINIC | Age: 60
End: 2020-09-30

## 2020-09-30 DIAGNOSIS — Z00.00 PHYSICAL EXAM: Primary | ICD-10-CM

## 2020-09-30 NOTE — TELEPHONE ENCOUNTER
----- Message from Jae Blizzard sent at 9/30/2020  7:53 AM EDT -----  Regarding: Order  These pt need a HBA1C order for 10/5 thank you.

## 2021-01-25 ENCOUNTER — PATIENT MESSAGE (OUTPATIENT)
Dept: INTERNAL MEDICINE CLINIC | Age: 61
End: 2021-01-25

## 2021-01-25 NOTE — TELEPHONE ENCOUNTER
Kyle Dove am contacting you on behalf of [facility]. This is a reminder that your Mammogram is due . Please contact [facility] at [phone] or [phone]. If you need further assistance with scheduling a Mammogram appointment please contact the office at [phone]  Thank you for your attention to this matter.     SULAIMAN Baum Panel Manager

## 2023-11-14 NOTE — ANESTHESIA POSTPROCEDURE EVALUATION
Procedure(s): EXCISIONAL BIOPSY SOFT TISSUE MASS OF THE BACK (LEFT LATERAL DECUBITUS). Anesthesia Post Evaluation Multimodal analgesia: multimodal analgesia used between 6 hours prior to anesthesia start to PACU discharge Patient location during evaluation: bedside Patient participation: complete - patient participated Level of consciousness: awake Pain management: adequate Airway patency: patent Anesthetic complications: no 
Cardiovascular status: acceptable Respiratory status: acceptable Hydration status: acceptable Visit Vitals /65 Pulse 71 Temp 36.6 °C (97.9 °F) Resp 18 Ht 5' 5\" (1.651 m) Wt 91.2 kg (201 lb) SpO2 100% BMI 33.45 kg/m²
.

## 2025-05-30 NOTE — PATIENT INSTRUCTIONS
If you have any questions or concerns about today's appointment, the verbal and/or written instructions you were given for follow up care, please call our office at [phone].     [facility] - [address]    [phone] office  [phone]
None known

## (undated) DEVICE — FLUFF AND POLYMER UNDERPAD,EXTRA HEAVY: Brand: WINGS

## (undated) DEVICE — NDL INJ SCLERO 25G 240CM -- INTERJECT M00518360 BX/5

## (undated) DEVICE — SNARE POLYP M W27MMXL240CM OVL STIFF DISP CAPTIVATOR

## (undated) DEVICE — SYR 10ML LUER LOK 1/5ML GRAD --

## (undated) DEVICE — SYRINGE MED 3ML NDL 22GA L1IN PLAS N CTRL LUERLOCK TIP REG

## (undated) DEVICE — SYR 50ML SLIP TIP NSAF LF STRL --

## (undated) DEVICE — CATH IV SAFE STR 22GX1IN BLU -- PROTECTIV PLUS

## (undated) DEVICE — INTENDED FOR TISSUE SEPARATION, AND OTHER PROCEDURES THAT REQUIRE A SHARP SURGICAL BLADE TO PUNCTURE OR CUT.: Brand: BARD-PARKER ® CARBON RIB-BACK BLADES

## (undated) DEVICE — SUTURE MCRYL SZ 4-0 L18IN ABSRB UD L19MM PS-2 3/8 CIR PRIM Y496G

## (undated) DEVICE — COLUMN DRAPE

## (undated) DEVICE — MEDI-VAC NON-CONDUCTIVE SUCTION TUBING: Brand: CARDINAL HEALTH

## (undated) DEVICE — AIRLIFE™ NASAL OXYGEN CANNULA CURVED, NONFLARED TIP WITH 14 FOOT (4.3 M) CRUSH-RESISTANT TUBING, OVER-THE-EAR STYLE: Brand: AIRLIFE™

## (undated) DEVICE — TRAP SPEC COLL POLYP POLYSTYR --

## (undated) DEVICE — DRAPE TWL SURG 16X26IN BLU ORB04] ALLCARE INC]

## (undated) DEVICE — STERILE POLYISOPRENE POWDER-FREE SURGICAL GLOVES: Brand: PROTEXIS

## (undated) DEVICE — 3M(TM) TEGADERM(TM) TRANSPARENT FILM DRESSING FRAME STYLE 9505W: Brand: 3M™ TEGADERM™

## (undated) DEVICE — (D)SYR 10ML 1/5ML GRAD NSAF -- PKGING CHANGE USE ITEM 338027

## (undated) DEVICE — 3M™ BAIR PAWS FLEX™ WARMING GOWN, STANDARD, 20 PER CASE 81003: Brand: BAIR PAWS™

## (undated) DEVICE — APPLICATOR BNDG 1MM ADH PREMIERPRO EXOFIN

## (undated) DEVICE — FLEX ADVANTAGE 3000CC: Brand: FLEX ADVANTAGE

## (undated) DEVICE — PREP SKN CHLRAPRP 26ML TNT -- CONVERT TO ITEM 373320

## (undated) DEVICE — KIT CLN UP BON SECOURS MARYV

## (undated) DEVICE — SUTURE ABSORBABLE BRAIDED 2-0 CT-1 27 IN UD VICRYL J259H

## (undated) DEVICE — SNARE POLYP OVAL TIP 30X55MM -- LARIAT

## (undated) DEVICE — SUTURE VCRL SZ 3-0 L27IN ABSRB UD L26MM SH 1/2 CIR J416H

## (undated) DEVICE — REM POLYHESIVE ADULT PATIENT RETURN ELECTRODE: Brand: VALLEYLAB

## (undated) DEVICE — Device

## (undated) DEVICE — ARM DRAPE

## (undated) DEVICE — SMOKE EVACUATION PENCIL: Brand: VALLEYLAB

## (undated) DEVICE — PACK PROCEDURE SURG MAJ W/ BASIN LF

## (undated) DEVICE — SUTURE PDS II SZ 1 L36IN ABSRB VLT L36MM CT-1 1/2 CIR Z347H

## (undated) DEVICE — GAUZE,SPONGE,2"X2",8PLY,STERILE,LF,2'S: Brand: MEDLINE

## (undated) DEVICE — SOLUTION IV 1000ML 0.9% SOD CHL

## (undated) DEVICE — INSULATED BLADE ELECTRODE: Brand: EDGE

## (undated) DEVICE — NEEDLE HYPO 25GA L1.5IN BVL ORIENTED ECLIPSE

## (undated) DEVICE — MAYO STAND COVER: Brand: CONVERTORS

## (undated) DEVICE — CARTRIDGE CLP LIG HEMLOK GRN --

## (undated) DEVICE — (D)PACK ICE DISP -- DISC BY MFR

## (undated) DEVICE — INTENDED FOR TISSUE SEPARATION, AND OTHER PROCEDURES THAT REQUIRE A SHARP SURGICAL BLADE TO PUNCTURE OR CUT.: Brand: BARD-PARKER SAFETY BLADES SIZE 10, STERILE

## (undated) DEVICE — TIP COVER ACCESSORY

## (undated) DEVICE — CORD ES L10FT MPLR LAP

## (undated) DEVICE — THREE-QUARTER SHEET: Brand: CONVERTORS

## (undated) DEVICE — COVER LT HNDL FLX

## (undated) DEVICE — KENDALL SCD EXPRESS SLEEVES, KNEE LENGTH, MEDIUM: Brand: KENDALL SCD

## (undated) DEVICE — NEEDLE INSUF L120MM DIA2MM DISP FOR PNEUMOPERI ENDOPATH

## (undated) DEVICE — FLEX ADVANTAGE 1500CC: Brand: FLEX ADVANTAGE